# Patient Record
Sex: FEMALE | Race: WHITE | NOT HISPANIC OR LATINO | Employment: FULL TIME | URBAN - NONMETROPOLITAN AREA
[De-identification: names, ages, dates, MRNs, and addresses within clinical notes are randomized per-mention and may not be internally consistent; named-entity substitution may affect disease eponyms.]

---

## 2018-08-30 PROBLEM — M81.8 IDIOPATHIC OSTEOPOROSIS: Status: ACTIVE | Noted: 2018-08-30

## 2018-08-30 RX ORDER — SODIUM CHLORIDE 9 MG/ML
250 INJECTION, SOLUTION INTRAVENOUS ONCE
Status: CANCELLED | OUTPATIENT
Start: 2018-08-30

## 2018-09-04 RX ORDER — SODIUM CHLORIDE 9 MG/ML
250 INJECTION, SOLUTION INTRAVENOUS ONCE
Status: CANCELLED | OUTPATIENT
Start: 2018-09-04

## 2018-09-07 ENCOUNTER — APPOINTMENT (OUTPATIENT)
Dept: ONCOLOGY | Facility: HOSPITAL | Age: 53
End: 2018-09-07

## 2018-09-10 ENCOUNTER — INFUSION (OUTPATIENT)
Dept: ONCOLOGY | Facility: HOSPITAL | Age: 53
End: 2018-09-10

## 2018-09-10 VITALS
DIASTOLIC BLOOD PRESSURE: 61 MMHG | RESPIRATION RATE: 16 BRPM | TEMPERATURE: 97 F | HEART RATE: 70 BPM | SYSTOLIC BLOOD PRESSURE: 111 MMHG

## 2018-09-10 DIAGNOSIS — M81.8 IDIOPATHIC OSTEOPOROSIS: Primary | ICD-10-CM

## 2018-09-10 PROCEDURE — 96365 THER/PROPH/DIAG IV INF INIT: CPT | Performed by: NURSE PRACTITIONER

## 2018-09-10 PROCEDURE — 25010000002 ZOLEDRONIC ACID 5 MG/100ML SOLUTION: Performed by: NURSE PRACTITIONER

## 2018-09-10 RX ORDER — SODIUM CHLORIDE 9 MG/ML
250 INJECTION, SOLUTION INTRAVENOUS ONCE
OUTPATIENT
Start: 2018-09-10

## 2018-09-10 RX ORDER — ZOLEDRONIC ACID 5 MG/100ML
5 INJECTION, SOLUTION INTRAVENOUS ONCE
Status: CANCELLED | OUTPATIENT
Start: 2018-09-10

## 2018-09-10 RX ORDER — SODIUM CHLORIDE 9 MG/ML
250 INJECTION, SOLUTION INTRAVENOUS ONCE
Status: COMPLETED | OUTPATIENT
Start: 2018-09-10 | End: 2018-09-10

## 2018-09-10 RX ORDER — ZOLEDRONIC ACID 5 MG/100ML
5 INJECTION, SOLUTION INTRAVENOUS ONCE
Status: COMPLETED | OUTPATIENT
Start: 2018-09-10 | End: 2018-09-10

## 2018-09-10 RX ADMIN — ZOLEDRONIC ACID 5 MG: 5 INJECTION, SOLUTION INTRAVENOUS at 13:48

## 2018-09-10 RX ADMIN — SODIUM CHLORIDE 250 ML: 9 INJECTION, SOLUTION INTRAVENOUS at 13:48

## 2019-09-10 ENCOUNTER — APPOINTMENT (OUTPATIENT)
Dept: ONCOLOGY | Facility: HOSPITAL | Age: 54
End: 2019-09-10

## 2020-07-08 ENCOUNTER — OFFICE VISIT (OUTPATIENT)
Dept: ORTHOPEDIC SURGERY | Facility: CLINIC | Age: 55
End: 2020-07-08

## 2020-07-08 VITALS — HEIGHT: 63 IN | BODY MASS INDEX: 22.86 KG/M2 | WEIGHT: 129 LBS

## 2020-07-08 DIAGNOSIS — S46.011A ROTATOR CUFF STRAIN, RIGHT, INITIAL ENCOUNTER: ICD-10-CM
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
## 2020-07-08 DIAGNOSIS — M79.604 LOW BACK PAIN RADIATING TO RIGHT LOWER EXTREMITY: ICD-10-CM

## 2020-07-08 DIAGNOSIS — M25.511 ACUTE PAIN OF RIGHT SHOULDER: Primary | ICD-10-CM

## 2020-07-08 DIAGNOSIS — M25.551 RIGHT HIP PAIN: ICD-10-CM

## 2020-07-08 DIAGNOSIS — M54.50 LOW BACK PAIN RADIATING TO RIGHT LOWER EXTREMITY: ICD-10-CM

## 2020-07-08 PROCEDURE — 20610 DRAIN/INJ JOINT/BURSA W/O US: CPT | Performed by: ORTHOPAEDIC SURGERY

## 2020-07-08 PROCEDURE — 99204 OFFICE O/P NEW MOD 45 MIN: CPT | Performed by: ORTHOPAEDIC SURGERY

## 2020-07-08 RX ORDER — METHYLPREDNISOLONE ACETATE 40 MG/ML
80 INJECTION, SUSPENSION INTRA-ARTICULAR; INTRALESIONAL; INTRAMUSCULAR; SOFT TISSUE ONCE
Status: COMPLETED | OUTPATIENT
Start: 2020-07-08 | End: 2020-07-08

## 2020-07-08 RX ORDER — ESCITALOPRAM OXALATE 20 MG/1
20 TABLET ORAL DAILY
COMMUNITY
Start: 2020-06-23 | End: 2021-09-07

## 2020-07-08 RX ORDER — TEMAZEPAM 15 MG/1
15 CAPSULE ORAL NIGHTLY PRN
COMMUNITY
Start: 2020-06-24 | End: 2020-08-31

## 2020-07-08 RX ORDER — ESCITALOPRAM OXALATE 20 MG/1
TABLET ORAL
COMMUNITY
Start: 2020-06-23 | End: 2020-07-08 | Stop reason: SDUPTHER

## 2020-07-08 RX ORDER — CYCLOBENZAPRINE HCL 10 MG
TABLET ORAL
COMMUNITY
Start: 2020-06-29 | End: 2020-08-17

## 2020-07-08 RX ADMIN — METHYLPREDNISOLONE ACETATE 80 MG: 40 INJECTION, SUSPENSION INTRA-ARTICULAR; INTRALESIONAL; INTRAMUSCULAR; SOFT TISSUE at 16:13
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
## 2020-07-16 ENCOUNTER — HOSPITAL ENCOUNTER (OUTPATIENT)
Dept: PHYSICAL THERAPY | Facility: HOSPITAL | Age: 55
Setting detail: THERAPIES SERIES
Discharge: HOME | End: 2020-07-16

## 2020-07-16 DIAGNOSIS — M54.50 LOW BACK PAIN RADIATING TO RIGHT LOWER EXTREMITY: ICD-10-CM

## 2020-07-16 DIAGNOSIS — M25.551 RIGHT HIP PAIN: ICD-10-CM

## 2020-07-16 DIAGNOSIS — M25.511 ACUTE PAIN OF RIGHT SHOULDER: Primary | ICD-10-CM
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
## 2020-07-16 DIAGNOSIS — M79.604 LOW BACK PAIN RADIATING TO RIGHT LOWER EXTREMITY: ICD-10-CM

## 2020-07-16 DIAGNOSIS — S46.011A ROTATOR CUFF STRAIN, RIGHT, INITIAL ENCOUNTER: ICD-10-CM

## 2020-07-16 PROCEDURE — 97162 PT EVAL MOD COMPLEX 30 MIN: CPT
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
## 2020-07-21 ENCOUNTER — HOSPITAL ENCOUNTER (OUTPATIENT)
Dept: PHYSICAL THERAPY | Facility: HOSPITAL | Age: 55
Setting detail: THERAPIES SERIES
Discharge: HOME | End: 2020-07-21

## 2020-07-21 DIAGNOSIS — S46.011A ROTATOR CUFF STRAIN, RIGHT, INITIAL ENCOUNTER: ICD-10-CM

## 2020-07-21 DIAGNOSIS — M79.604 LOW BACK PAIN RADIATING TO RIGHT LOWER EXTREMITY: ICD-10-CM
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
## 2020-07-21 DIAGNOSIS — M25.511 ACUTE PAIN OF RIGHT SHOULDER: Primary | ICD-10-CM

## 2020-07-21 DIAGNOSIS — M25.551 RIGHT HIP PAIN: ICD-10-CM

## 2020-07-21 DIAGNOSIS — M54.50 LOW BACK PAIN RADIATING TO RIGHT LOWER EXTREMITY: ICD-10-CM

## 2020-07-21 PROCEDURE — 97110 THERAPEUTIC EXERCISES: CPT

## 2020-07-21 PROCEDURE — 97140 MANUAL THERAPY 1/> REGIONS: CPT
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
## 2020-07-23 ENCOUNTER — HOSPITAL ENCOUNTER (OUTPATIENT)
Dept: PHYSICAL THERAPY | Facility: HOSPITAL | Age: 55
Setting detail: THERAPIES SERIES
Discharge: HOME | End: 2020-07-23

## 2020-07-23 DIAGNOSIS — M79.604 LOW BACK PAIN RADIATING TO RIGHT LOWER EXTREMITY: ICD-10-CM

## 2020-07-23 DIAGNOSIS — M25.511 ACUTE PAIN OF RIGHT SHOULDER: Primary | ICD-10-CM

## 2020-07-23 DIAGNOSIS — M54.50 LOW BACK PAIN RADIATING TO RIGHT LOWER EXTREMITY: ICD-10-CM

## 2020-07-23 DIAGNOSIS — M25.551 RIGHT HIP PAIN: ICD-10-CM
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
## 2020-07-23 PROCEDURE — 97110 THERAPEUTIC EXERCISES: CPT
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
## 2020-07-24 ENCOUNTER — TELEPHONE (OUTPATIENT)
Dept: ORTHOPEDIC SURGERY | Facility: CLINIC | Age: 55
End: 2020-07-24

## 2020-07-24 ENCOUNTER — OFFICE VISIT (OUTPATIENT)
Dept: CARDIAC SURGERY | Facility: CLINIC | Age: 55
End: 2020-07-24

## 2020-07-24 VITALS
HEIGHT: 63 IN | DIASTOLIC BLOOD PRESSURE: 70 MMHG | WEIGHT: 129 LBS | TEMPERATURE: 98.2 F | BODY MASS INDEX: 22.86 KG/M2 | HEART RATE: 83 BPM | SYSTOLIC BLOOD PRESSURE: 120 MMHG | OXYGEN SATURATION: 98 %

## 2020-07-24 DIAGNOSIS — J43.1 PANLOBULAR EMPHYSEMA (HCC): ICD-10-CM

## 2020-07-24 DIAGNOSIS — E78.2 MIXED HYPERLIPIDEMIA: ICD-10-CM

## 2020-07-24 DIAGNOSIS — I10 BENIGN ESSENTIAL HTN: ICD-10-CM

## 2020-07-24 DIAGNOSIS — R91.1 SOLITARY PULMONARY NODULE: Primary | ICD-10-CM

## 2020-07-24 PROCEDURE — 99204 OFFICE O/P NEW MOD 45 MIN: CPT | Performed by: THORACIC SURGERY (CARDIOTHORACIC VASCULAR SURGERY)

## 2020-07-24 RX ORDER — PRAVASTATIN SODIUM 40 MG
40 TABLET ORAL NIGHTLY
COMMUNITY
End: 2021-11-04

## 2020-07-27 ENCOUNTER — TELEPHONE (OUTPATIENT)
Dept: ORTHOPEDIC SURGERY | Facility: CLINIC | Age: 55
End: 2020-07-27

## 2020-07-28 ENCOUNTER — HOSPITAL ENCOUNTER (OUTPATIENT)
Dept: PHYSICAL THERAPY | Facility: HOSPITAL | Age: 55
Setting detail: THERAPIES SERIES
Discharge: HOME | End: 2020-07-28

## 2020-07-28 DIAGNOSIS — S46.011A ROTATOR CUFF STRAIN, RIGHT, INITIAL ENCOUNTER: ICD-10-CM
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
## 2020-07-28 DIAGNOSIS — M25.511 ACUTE PAIN OF RIGHT SHOULDER: Primary | ICD-10-CM

## 2020-07-28 DIAGNOSIS — M54.50 LOW BACK PAIN RADIATING TO RIGHT LOWER EXTREMITY: ICD-10-CM

## 2020-07-28 DIAGNOSIS — M79.604 LOW BACK PAIN RADIATING TO RIGHT LOWER EXTREMITY: ICD-10-CM

## 2020-07-28 DIAGNOSIS — M25.551 RIGHT HIP PAIN: ICD-10-CM

## 2020-07-28 PROCEDURE — 97110 THERAPEUTIC EXERCISES: CPT

## 2020-07-28 PROCEDURE — 97140 MANUAL THERAPY 1/> REGIONS: CPT
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
## 2020-07-29 ENCOUNTER — OFFICE VISIT (OUTPATIENT)
Dept: ORTHOPEDIC SURGERY | Facility: CLINIC | Age: 55
End: 2020-07-29

## 2020-07-29 VITALS — WEIGHT: 126 LBS | BODY MASS INDEX: 22.32 KG/M2 | HEIGHT: 63 IN

## 2020-07-29 DIAGNOSIS — M25.511 ACUTE PAIN OF RIGHT SHOULDER: Primary | ICD-10-CM

## 2020-07-29 DIAGNOSIS — M54.50 LOW BACK PAIN RADIATING TO RIGHT LOWER EXTREMITY: ICD-10-CM
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
## 2020-07-29 DIAGNOSIS — M25.551 RIGHT HIP PAIN: ICD-10-CM

## 2020-07-29 DIAGNOSIS — S46.011A ROTATOR CUFF STRAIN, RIGHT, INITIAL ENCOUNTER: ICD-10-CM

## 2020-07-29 DIAGNOSIS — M79.604 LOW BACK PAIN RADIATING TO RIGHT LOWER EXTREMITY: ICD-10-CM

## 2020-07-29 PROCEDURE — 99214 OFFICE O/P EST MOD 30 MIN: CPT | Performed by: ORTHOPAEDIC SURGERY
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
## 2020-07-30 ENCOUNTER — HOSPITAL ENCOUNTER (OUTPATIENT)
Dept: PHYSICAL THERAPY | Facility: HOSPITAL | Age: 55
Setting detail: THERAPIES SERIES
Discharge: HOME | End: 2020-07-30

## 2020-07-30 DIAGNOSIS — M25.551 RIGHT HIP PAIN: ICD-10-CM
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
## 2020-07-30 DIAGNOSIS — M54.50 LOW BACK PAIN RADIATING TO RIGHT LOWER EXTREMITY: ICD-10-CM

## 2020-07-30 DIAGNOSIS — S46.011A ROTATOR CUFF STRAIN, RIGHT, INITIAL ENCOUNTER: ICD-10-CM

## 2020-07-30 DIAGNOSIS — M79.604 LOW BACK PAIN RADIATING TO RIGHT LOWER EXTREMITY: ICD-10-CM

## 2020-07-30 DIAGNOSIS — M25.511 ACUTE PAIN OF RIGHT SHOULDER: Primary | ICD-10-CM

## 2020-07-30 PROCEDURE — 97110 THERAPEUTIC EXERCISES: CPT
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
## 2020-07-31 ENCOUNTER — HOSPITAL ENCOUNTER (OUTPATIENT)
Dept: PET IMAGING | Facility: HOSPITAL | Age: 55
Discharge: HOME | End: 2020-07-31
Admitting: THORACIC SURGERY (CARDIOTHORACIC VASCULAR SURGERY)

## 2020-07-31 ENCOUNTER — PROCEDURE VISIT (OUTPATIENT)
Dept: PULMONOLOGY | Facility: CLINIC | Age: 55
End: 2020-07-31

## 2020-07-31 ENCOUNTER — APPOINTMENT (OUTPATIENT)
Dept: PHYSICAL THERAPY | Facility: HOSPITAL | Age: 55
End: 2020-07-31

## 2020-07-31 DIAGNOSIS — J43.1 PANLOBULAR EMPHYSEMA (HCC): ICD-10-CM

## 2020-07-31 DIAGNOSIS — R91.1 SOLITARY PULMONARY NODULE: ICD-10-CM

## 2020-07-31 PROCEDURE — A9552 F18 FDG: HCPCS | Performed by: THORACIC SURGERY (CARDIOTHORACIC VASCULAR SURGERY)

## 2020-07-31 PROCEDURE — 0 FLUDEOXYGLUCOSE F18 SOLUTION: Performed by: THORACIC SURGERY (CARDIOTHORACIC VASCULAR SURGERY)

## 2020-07-31 PROCEDURE — 94729 DIFFUSING CAPACITY: CPT | Performed by: INTERNAL MEDICINE

## 2020-07-31 PROCEDURE — 94727 GAS DIL/WSHOT DETER LNG VOL: CPT | Performed by: INTERNAL MEDICINE

## 2020-07-31 PROCEDURE — 78815 PET IMAGE W/CT SKULL-THIGH: CPT

## 2020-07-31 PROCEDURE — 94060 EVALUATION OF WHEEZING: CPT | Performed by: INTERNAL MEDICINE

## 2020-07-31 RX ADMIN — FLUDEOXYGLUCOSE F18 1 DOSE: 300 INJECTION INTRAVENOUS at 09:02

## 2020-08-04 ENCOUNTER — HOSPITAL ENCOUNTER (OUTPATIENT)
Dept: PHYSICAL THERAPY | Facility: HOSPITAL | Age: 55
Setting detail: THERAPIES SERIES
Discharge: HOME | End: 2020-08-04

## 2020-08-04 DIAGNOSIS — M79.604 LOW BACK PAIN RADIATING TO RIGHT LOWER EXTREMITY: ICD-10-CM

## 2020-08-04 DIAGNOSIS — M54.50 LOW BACK PAIN RADIATING TO RIGHT LOWER EXTREMITY: ICD-10-CM
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
## 2020-08-04 DIAGNOSIS — S46.011A STRAIN OF RIGHT ROTATOR CUFF CAPSULE, INITIAL ENCOUNTER: ICD-10-CM

## 2020-08-04 DIAGNOSIS — M25.551 RIGHT HIP PAIN: ICD-10-CM

## 2020-08-04 DIAGNOSIS — M25.511 ACUTE PAIN OF RIGHT SHOULDER: Primary | ICD-10-CM

## 2020-08-04 PROCEDURE — 97140 MANUAL THERAPY 1/> REGIONS: CPT

## 2020-08-04 PROCEDURE — 97110 THERAPEUTIC EXERCISES: CPT
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
## 2020-08-06 ENCOUNTER — HOSPITAL ENCOUNTER (OUTPATIENT)
Dept: PHYSICAL THERAPY | Facility: HOSPITAL | Age: 55
Setting detail: THERAPIES SERIES
Discharge: HOME | End: 2020-08-06
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
## 2020-08-06 DIAGNOSIS — M25.551 RIGHT HIP PAIN: ICD-10-CM

## 2020-08-06 DIAGNOSIS — M54.50 LOW BACK PAIN RADIATING TO RIGHT LOWER EXTREMITY: ICD-10-CM

## 2020-08-06 DIAGNOSIS — S46.011A ROTATOR CUFF STRAIN, RIGHT, INITIAL ENCOUNTER: ICD-10-CM

## 2020-08-06 DIAGNOSIS — M79.604 LOW BACK PAIN RADIATING TO RIGHT LOWER EXTREMITY: ICD-10-CM

## 2020-08-06 DIAGNOSIS — M25.511 ACUTE PAIN OF RIGHT SHOULDER: Primary | ICD-10-CM

## 2020-08-06 PROCEDURE — 97110 THERAPEUTIC EXERCISES: CPT

## 2020-08-06 PROCEDURE — 97140 MANUAL THERAPY 1/> REGIONS: CPT
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
## 2020-08-10 ENCOUNTER — OFFICE VISIT (OUTPATIENT)
Dept: CARDIAC SURGERY | Facility: CLINIC | Age: 55
End: 2020-08-10

## 2020-08-10 ENCOUNTER — PROCEDURE VISIT (OUTPATIENT)
Dept: PULMONOLOGY | Facility: CLINIC | Age: 55
End: 2020-08-10

## 2020-08-10 VITALS
DIASTOLIC BLOOD PRESSURE: 76 MMHG | SYSTOLIC BLOOD PRESSURE: 133 MMHG | HEIGHT: 63 IN | OXYGEN SATURATION: 98 % | BODY MASS INDEX: 22.54 KG/M2 | WEIGHT: 127.2 LBS | HEART RATE: 76 BPM

## 2020-08-10 DIAGNOSIS — R91.1 LUNG NODULE: ICD-10-CM

## 2020-08-10 DIAGNOSIS — E78.2 MIXED HYPERLIPIDEMIA: ICD-10-CM

## 2020-08-10 DIAGNOSIS — I10 BENIGN ESSENTIAL HTN: ICD-10-CM

## 2020-08-10 DIAGNOSIS — J43.1 PANLOBULAR EMPHYSEMA (HCC): Primary | ICD-10-CM

## 2020-08-10 DIAGNOSIS — J43.1 PANLOBULAR EMPHYSEMA (HCC): ICD-10-CM

## 2020-08-10 PROCEDURE — 94060 EVALUATION OF WHEEZING: CPT | Performed by: INTERNAL MEDICINE

## 2020-08-10 PROCEDURE — 99215 OFFICE O/P EST HI 40 MIN: CPT | Performed by: THORACIC SURGERY (CARDIOTHORACIC VASCULAR SURGERY)

## 2020-08-10 RX ORDER — SODIUM CHLORIDE 9 MG/ML
50 INJECTION, SOLUTION INTRAVENOUS CONTINUOUS
Status: CANCELLED | OUTPATIENT
Start: 2020-08-20

## 2020-08-10 RX ORDER — BUPIVACAINE HCL/0.9 % NACL/PF 0.1 %
2 PLASTIC BAG, INJECTION (ML) EPIDURAL ONCE
Status: CANCELLED | OUTPATIENT
Start: 2020-08-20 | End: 2020-08-10

## 2020-08-11 ENCOUNTER — TELEPHONE (OUTPATIENT)
Dept: ORTHOPEDIC SURGERY | Facility: CLINIC | Age: 55
End: 2020-08-11

## 2020-08-11 ENCOUNTER — HOSPITAL ENCOUNTER (OUTPATIENT)
Dept: PHYSICAL THERAPY | Facility: HOSPITAL | Age: 55
Setting detail: THERAPIES SERIES
Discharge: HOME | End: 2020-08-11

## 2020-08-11 DIAGNOSIS — M79.604 LOW BACK PAIN RADIATING TO RIGHT LOWER EXTREMITY: ICD-10-CM

## 2020-08-11 DIAGNOSIS — S46.011A ROTATOR CUFF STRAIN, RIGHT, INITIAL ENCOUNTER: ICD-10-CM
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
## 2020-08-11 DIAGNOSIS — M25.511 ACUTE PAIN OF RIGHT SHOULDER: Primary | ICD-10-CM

## 2020-08-11 DIAGNOSIS — M25.551 RIGHT HIP PAIN: ICD-10-CM

## 2020-08-11 DIAGNOSIS — M54.50 LOW BACK PAIN RADIATING TO RIGHT LOWER EXTREMITY: ICD-10-CM

## 2020-08-11 PROCEDURE — 97110 THERAPEUTIC EXERCISES: CPT

## 2020-08-11 NOTE — TELEPHONE ENCOUNTER
GRADY       Pt JUST WANTED TO LET YOU KNOW SHE HAD TO CANCEL HER APPOINTMENT BECAUSE SHE HAS LUNG CANCER AND THEY ARE GOING TO BE DOING SURGERY     SHE STATES HER SHOULDER HAS IMPROVED BUT HER LOWER BACK RT HIP IS STILL GIVING HER A LOT OF ISSUES AND WILL RESCHEDULE WITH YOU ONCE SHE HAS RECOVERED FROM SURGERY
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
## 2020-08-13 ENCOUNTER — APPOINTMENT (OUTPATIENT)
Dept: PHYSICAL THERAPY | Facility: HOSPITAL | Age: 55
End: 2020-08-13

## 2020-08-13 ENCOUNTER — ANESTHESIA EVENT (OUTPATIENT)
Dept: PERIOP | Facility: HOSPITAL | Age: 55
End: 2020-08-13

## 2020-08-17 ENCOUNTER — HOSPITAL ENCOUNTER (INPATIENT)
Facility: HOSPITAL | Age: 55
LOS: 4 days | Discharge: HOME | End: 2020-08-24
Attending: THORACIC SURGERY (CARDIOTHORACIC VASCULAR SURGERY) | Admitting: THORACIC SURGERY (CARDIOTHORACIC VASCULAR SURGERY)

## 2020-08-17 ENCOUNTER — APPOINTMENT (OUTPATIENT)
Dept: PREADMISSION TESTING | Facility: HOSPITAL | Age: 55
End: 2020-08-17

## 2020-08-17 ENCOUNTER — LAB (OUTPATIENT)
Dept: LAB | Facility: HOSPITAL | Age: 55
End: 2020-08-17

## 2020-08-17 VITALS
HEART RATE: 72 BPM | RESPIRATION RATE: 18 BRPM | WEIGHT: 127 LBS | OXYGEN SATURATION: 97 % | DIASTOLIC BLOOD PRESSURE: 60 MMHG | HEIGHT: 63 IN | BODY MASS INDEX: 22.5 KG/M2 | SYSTOLIC BLOOD PRESSURE: 106 MMHG

## 2020-08-17 DIAGNOSIS — Z48.813 SURGICAL AFTERCARE, RESPIRATORY SYSTEM: Primary | ICD-10-CM

## 2020-08-17 DIAGNOSIS — R91.1 LUNG NODULE: ICD-10-CM

## 2020-08-17 LAB
ABO GROUP BLD: NORMAL
BLD GP AB SCN SERPL QL: NEGATIVE
DEPRECATED RDW RBC AUTO: 44.6 FL (ref 37–54)
ERYTHROCYTE [DISTWIDTH] IN BLOOD BY AUTOMATED COUNT: 13.2 % (ref 12.3–15.4)
HCT VFR BLD AUTO: 35.4 % (ref 34–46.6)
HGB BLD-MCNC: 12.6 G/DL (ref 12–15.9)
Lab: NORMAL
MCH RBC QN AUTO: 32.7 PG (ref 26.6–33)
MCHC RBC AUTO-ENTMCNC: 35.6 G/DL (ref 31.5–35.7)
MCV RBC AUTO: 91.9 FL (ref 79–97)
PLATELET # BLD AUTO: 319 10*3/MM3 (ref 140–450)
PMV BLD AUTO: 8.2 FL (ref 6–12)
RBC # BLD AUTO: 3.85 10*6/MM3 (ref 3.77–5.28)
RH BLD: POSITIVE
T&S EXPIRATION DATE: NORMAL
WBC # BLD AUTO: 6.94 10*3/MM3 (ref 3.4–10.8)

## 2020-08-17 PROCEDURE — 86901 BLOOD TYPING SEROLOGIC RH(D): CPT | Performed by: THORACIC SURGERY (CARDIOTHORACIC VASCULAR SURGERY)

## 2020-08-17 PROCEDURE — 36415 COLL VENOUS BLD VENIPUNCTURE: CPT

## 2020-08-17 PROCEDURE — C9803 HOPD COVID-19 SPEC COLLECT: HCPCS | Performed by: THORACIC SURGERY (CARDIOTHORACIC VASCULAR SURGERY)

## 2020-08-17 PROCEDURE — 86900 BLOOD TYPING SEROLOGIC ABO: CPT | Performed by: THORACIC SURGERY (CARDIOTHORACIC VASCULAR SURGERY)

## 2020-08-17 PROCEDURE — U0003 INFECTIOUS AGENT DETECTION BY NUCLEIC ACID (DNA OR RNA); SEVERE ACUTE RESPIRATORY SYNDROME CORONAVIRUS 2 (SARS-COV-2) (CORONAVIRUS DISEASE [COVID-19]), AMPLIFIED PROBE TECHNIQUE, MAKING USE OF HIGH THROUGHPUT TECHNOLOGIES AS DESCRIBED BY CMS-2020-01-R: HCPCS | Performed by: THORACIC SURGERY (CARDIOTHORACIC VASCULAR SURGERY)

## 2020-08-17 PROCEDURE — 86850 RBC ANTIBODY SCREEN: CPT | Performed by: THORACIC SURGERY (CARDIOTHORACIC VASCULAR SURGERY)

## 2020-08-17 PROCEDURE — 85027 COMPLETE CBC AUTOMATED: CPT | Performed by: THORACIC SURGERY (CARDIOTHORACIC VASCULAR SURGERY)

## 2020-08-17 RX ORDER — NICOTINE 21 MG/24HR
1 PATCH, TRANSDERMAL 24 HOURS TRANSDERMAL EVERY 24 HOURS
COMMUNITY
End: 2021-09-07

## 2020-08-17 RX ORDER — FLUTICASONE PROPIONATE 50 MCG
1 SPRAY, SUSPENSION (ML) NASAL DAILY
COMMUNITY

## 2020-08-17 NOTE — DISCHARGE INSTRUCTIONS
[facility]  Pre-op Information and Guidelines    You will be called after 2 p.m. the day before your surgery (Friday for Monday surgery) and notified of your time for arrival and approximate surgery time.  If you have not received a call by 4P.M., please contact Same Day Surgery at [phone] of if outside [facility] call [phone].    Please Follow these Important Safety Guidelines:    • The morning of your procedure, take only the medications listed below with   A sip of water:_____________________________________________       ______________________________________________    • DO NOT eat or drink anything after 12:00 midnight the night before surgery  Specific instructions concerning drinking clear liquids will be discussed during  the pre-surgery instruction call the day before your surgery.    • If you take a blood thinner (ex. Plavix, Coumadin, aspirin), ask your doctor when to stop it before surgery  STOP DATE: _________________    • Only 2 visitors are allowed in patient rooms at a time  Your visitors will be asked to wait in the lobby until the admission process is complete with the exception of a parent with a child and patients in need of special assistance.    • YOU CANNOT DRIVE YOURSELF HOME  You must be accompanied by someone who will be responsible for driving you home after surgery and for your care at home.    • DO NOT chew gum, use breath mints, hard candy, or smoke the day of surgery  • DO NOT drink alcohol for at least 24 hours before your surgery  • DO NOT wear any jewelry and remove all body piercing before coming to the hospital  • DO NOT wear make-up to the hospital  • If you are having surgery on an extremity (arm/leg/foot) remove nail polish/artificial nails on the surgical side  • Clothing, glasses, contacts, dentures, and hairpieces must be removed before surgery  • Bathe the night before or the morning of your surgery and do not use powders/lotions on  skin.

## 2020-08-18 ENCOUNTER — APPOINTMENT (OUTPATIENT)
Dept: PHYSICAL THERAPY | Facility: HOSPITAL | Age: 55
End: 2020-08-18

## 2020-08-18 LAB
COVID LABCORP PRIORITY: NORMAL
SARS-COV-2 RNA RESP QL NAA+PROBE: NOT DETECTED

## 2020-08-20 ENCOUNTER — ANESTHESIA (OUTPATIENT)
Dept: PERIOP | Facility: HOSPITAL | Age: 55
End: 2020-08-20

## 2020-08-20 ENCOUNTER — APPOINTMENT (OUTPATIENT)
Dept: GENERAL RADIOLOGY | Facility: HOSPITAL | Age: 55
End: 2020-08-20

## 2020-08-20 LAB
ABO GROUP BLD: NORMAL
BLD GP AB SCN SERPL QL: NEGATIVE
Lab: NORMAL
RH BLD: POSITIVE
T&S EXPIRATION DATE: NORMAL

## 2020-08-20 PROCEDURE — 25010000002 SUCCINYLCHOLINE PER 20 MG: Performed by: NURSE ANESTHETIST, CERTIFIED REGISTERED

## 2020-08-20 PROCEDURE — 32674 THORACOSCOPY LYMPH NODE EXC: CPT | Performed by: THORACIC SURGERY (CARDIOTHORACIC VASCULAR SURGERY)

## 2020-08-20 PROCEDURE — 86900 BLOOD TYPING SEROLOGIC ABO: CPT | Performed by: ANESTHESIOLOGY

## 2020-08-20 PROCEDURE — 25010000002 HYDROMORPHONE PER 4 MG: Performed by: THORACIC SURGERY (CARDIOTHORACIC VASCULAR SURGERY)

## 2020-08-20 PROCEDURE — 88342 IMHCHEM/IMCYTCHM 1ST ANTB: CPT

## 2020-08-20 PROCEDURE — 94640 AIRWAY INHALATION TREATMENT: CPT

## 2020-08-20 PROCEDURE — 88341 IMHCHEM/IMCYTCHM EA ADD ANTB: CPT

## 2020-08-20 PROCEDURE — 25010000002 ONDANSETRON PER 1 MG: Performed by: NURSE ANESTHETIST, CERTIFIED REGISTERED

## 2020-08-20 PROCEDURE — 25010000002 KETOROLAC TROMETHAMINE PER 15 MG: Performed by: THORACIC SURGERY (CARDIOTHORACIC VASCULAR SURGERY)

## 2020-08-20 PROCEDURE — S0260 H&P FOR SURGERY: HCPCS | Performed by: THORACIC SURGERY (CARDIOTHORACIC VASCULAR SURGERY)

## 2020-08-20 PROCEDURE — 86901 BLOOD TYPING SEROLOGIC RH(D): CPT | Performed by: ANESTHESIOLOGY

## 2020-08-20 PROCEDURE — 25010000002 DEXAMETHASONE PER 1 MG: Performed by: NURSE ANESTHETIST, CERTIFIED REGISTERED

## 2020-08-20 PROCEDURE — 0BBJ4ZZ EXCISION OF LEFT LOWER LUNG LOBE, PERCUTANEOUS ENDOSCOPIC APPROACH: ICD-10-PCS | Performed by: THORACIC SURGERY (CARDIOTHORACIC VASCULAR SURGERY)

## 2020-08-20 PROCEDURE — 25010000003 CEFAZOLIN PER 500 MG: Performed by: THORACIC SURGERY (CARDIOTHORACIC VASCULAR SURGERY)

## 2020-08-20 PROCEDURE — 25010000002 MIDAZOLAM PER 1 MG: Performed by: NURSE ANESTHETIST, CERTIFIED REGISTERED

## 2020-08-20 PROCEDURE — 86850 RBC ANTIBODY SCREEN: CPT | Performed by: ANESTHESIOLOGY

## 2020-08-20 PROCEDURE — 88313 SPECIAL STAINS GROUP 2: CPT

## 2020-08-20 PROCEDURE — 25010000002 CEFAZOLIN PER 500 MG: Performed by: THORACIC SURGERY (CARDIOTHORACIC VASCULAR SURGERY)

## 2020-08-20 PROCEDURE — 25010000002 HYDROMORPHONE 1 MG/ML SOLUTION: Performed by: NURSE ANESTHETIST, CERTIFIED REGISTERED

## 2020-08-20 PROCEDURE — 94799 UNLISTED PULMONARY SVC/PX: CPT

## 2020-08-20 PROCEDURE — 94760 N-INVAS EAR/PLS OXIMETRY 1: CPT

## 2020-08-20 PROCEDURE — 25010000002 PROPOFOL 10 MG/ML EMULSION: Performed by: NURSE ANESTHETIST, CERTIFIED REGISTERED

## 2020-08-20 PROCEDURE — 31622 DX BRONCHOSCOPE/WASH: CPT | Performed by: THORACIC SURGERY (CARDIOTHORACIC VASCULAR SURGERY)

## 2020-08-20 PROCEDURE — 88307 TISSUE EXAM BY PATHOLOGIST: CPT

## 2020-08-20 PROCEDURE — 25010000002 NEOSTIGMINE 4 MG/4ML SOLUTION PREFILLED SYRINGE: Performed by: NURSE ANESTHETIST, CERTIFIED REGISTERED

## 2020-08-20 PROCEDURE — 07B74ZX EXCISION OF THORAX LYMPHATIC, PERCUTANEOUS ENDOSCOPIC APPROACH, DIAGNOSTIC: ICD-10-PCS | Performed by: THORACIC SURGERY (CARDIOTHORACIC VASCULAR SURGERY)

## 2020-08-20 PROCEDURE — C1729 CATH, DRAINAGE: HCPCS | Performed by: THORACIC SURGERY (CARDIOTHORACIC VASCULAR SURGERY)

## 2020-08-20 PROCEDURE — 88331 PATH CONSLTJ SURG 1 BLK 1SPC: CPT

## 2020-08-20 PROCEDURE — 71045 X-RAY EXAM CHEST 1 VIEW: CPT

## 2020-08-20 PROCEDURE — 25010000002 FENTANYL CITRATE (PF) 100 MCG/2ML SOLUTION: Performed by: NURSE ANESTHETIST, CERTIFIED REGISTERED

## 2020-08-20 PROCEDURE — 32663 THORACOSCOPY W/LOBECTOMY: CPT | Performed by: THORACIC SURGERY (CARDIOTHORACIC VASCULAR SURGERY)

## 2020-08-20 PROCEDURE — 0BJ08ZZ INSPECTION OF TRACHEOBRONCHIAL TREE, VIA NATURAL OR ARTIFICIAL OPENING ENDOSCOPIC: ICD-10-PCS | Performed by: THORACIC SURGERY (CARDIOTHORACIC VASCULAR SURGERY)

## 2020-08-20 DEVICE — ENDOPATH ECHELON ENDOSCOPIC LINEAR CUTTER RELOADS, BLACK, 60MM
Type: IMPLANTABLE DEVICE | Site: LUNG | Status: FUNCTIONAL
Brand: ECHELON ENDOPATH

## 2020-08-20 DEVICE — PROXIMATE RELOADABLE LINEAR STAPLER, 30MM
Type: IMPLANTABLE DEVICE | Site: LUNG | Status: FUNCTIONAL
Brand: PROXIMATE

## 2020-08-20 DEVICE — ENDOPATH ECHELON VASCULAR  RELOADS, WHITE, 35MM
Type: IMPLANTABLE DEVICE | Site: LUNG | Status: FUNCTIONAL
Brand: ECHELON ENDOPATH

## 2020-08-20 DEVICE — STPL/LN REINF PERISTRIP DRY VERITAS GEL: Type: IMPLANTABLE DEVICE | Site: LUNG | Status: FUNCTIONAL

## 2020-08-20 DEVICE — PERI-STRIPS DRY WITH VERITAS COLLAGEN MATRIX (PSD-V) IS PREPARED FROM DEHYDRATED BOVINE PERICARDIUM PROCURED FROM CATTLE UNDER 30 MONTHS OF AGE IN THE UNITED STATES. ONE (1) TUBE OF PSD GEL (GEL) IS PROVIDED FOR EVERY TWO (2) POUCHES OF PSD-V. THE GEL IS USED TO CREATE A TEMPORARY BOND BETWEEN THE PSD-V BUTTRESS AND THE SURGICAL STAPLER JAWS UNTIL THE STAPLER IS POSITIONED AND FIRED.
Type: IMPLANTABLE DEVICE | Site: LUNG | Status: FUNCTIONAL
Brand: PERI-STRIPS DRY WITH VERITAS COLLAGEN MATRIX

## 2020-08-20 DEVICE — PROXIMATE LINEAR STAPLER RELOADS
Type: IMPLANTABLE DEVICE | Site: LUNG | Status: FUNCTIONAL
Brand: PROXIMATE

## 2020-08-20 DEVICE — CLIP LIGAT VASC HORIZON TI SM/WD RD 6CT: Type: IMPLANTABLE DEVICE | Site: LUNG | Status: FUNCTIONAL

## 2020-08-20 DEVICE — LIGACLIP MCA MULTIPLE CLIP APPLIERS, 20 MEDIUM CLIPS
Type: IMPLANTABLE DEVICE | Site: LUNG | Status: FUNCTIONAL
Brand: LIGACLIP

## 2020-08-20 RX ORDER — CEFAZOLIN SODIUM 1 G/3ML
INJECTION, POWDER, FOR SOLUTION INTRAMUSCULAR; INTRAVENOUS AS NEEDED
Status: DISCONTINUED | OUTPATIENT
Start: 2020-08-20 | End: 2020-08-20 | Stop reason: HOSPADM

## 2020-08-20 RX ORDER — NEOSTIGMINE METHYLSULFATE 4 MG/4 ML
SYRINGE (ML) INTRAVENOUS AS NEEDED
Status: DISCONTINUED | OUTPATIENT
Start: 2020-08-20 | End: 2020-08-20 | Stop reason: SURG

## 2020-08-20 RX ORDER — FLUMAZENIL 0.1 MG/ML
0.2 INJECTION INTRAVENOUS AS NEEDED
Status: DISCONTINUED | OUTPATIENT
Start: 2020-08-20 | End: 2020-08-20 | Stop reason: HOSPADM

## 2020-08-20 RX ORDER — ACETAMINOPHEN 325 MG/1
650 TABLET ORAL ONCE AS NEEDED
Status: DISCONTINUED | OUTPATIENT
Start: 2020-08-20 | End: 2020-08-20 | Stop reason: HOSPADM

## 2020-08-20 RX ORDER — DIPHENHYDRAMINE HYDROCHLORIDE 50 MG/ML
25 INJECTION INTRAMUSCULAR; INTRAVENOUS EVERY 6 HOURS PRN
Status: DISCONTINUED | OUTPATIENT
Start: 2020-08-20 | End: 2020-08-21

## 2020-08-20 RX ORDER — BUPIVACAINE HCL/0.9 % NACL/PF 0.1 %
2 PLASTIC BAG, INJECTION (ML) EPIDURAL ONCE
Status: COMPLETED | OUTPATIENT
Start: 2020-08-20 | End: 2020-08-20

## 2020-08-20 RX ORDER — BUPIVACAINE HYDROCHLORIDE 5 MG/ML
INJECTION, SOLUTION EPIDURAL; INTRACAUDAL AS NEEDED
Status: DISCONTINUED | OUTPATIENT
Start: 2020-08-20 | End: 2020-08-20 | Stop reason: HOSPADM

## 2020-08-20 RX ORDER — EPHEDRINE SULFATE 50 MG/ML
5 INJECTION, SOLUTION INTRAVENOUS ONCE AS NEEDED
Status: DISCONTINUED | OUTPATIENT
Start: 2020-08-20 | End: 2020-08-20 | Stop reason: HOSPADM

## 2020-08-20 RX ORDER — SODIUM CHLORIDE, SODIUM GLUCONATE, SODIUM ACETATE, POTASSIUM CHLORIDE, AND MAGNESIUM CHLORIDE 526; 502; 368; 37; 30 MG/100ML; MG/100ML; MG/100ML; MG/100ML; MG/100ML
INJECTION, SOLUTION INTRAVENOUS CONTINUOUS PRN
Status: DISCONTINUED | OUTPATIENT
Start: 2020-08-20 | End: 2020-08-20 | Stop reason: SURG

## 2020-08-20 RX ORDER — NALOXONE HCL 0.4 MG/ML
0.4 VIAL (ML) INJECTION AS NEEDED
Status: DISCONTINUED | OUTPATIENT
Start: 2020-08-20 | End: 2020-08-20 | Stop reason: HOSPADM

## 2020-08-20 RX ORDER — ONDANSETRON 2 MG/ML
4 INJECTION INTRAMUSCULAR; INTRAVENOUS EVERY 6 HOURS PRN
Status: DISCONTINUED | OUTPATIENT
Start: 2020-08-20 | End: 2020-08-24 | Stop reason: HOSPADM

## 2020-08-20 RX ORDER — SODIUM CHLORIDE 9 MG/ML
30 INJECTION, SOLUTION INTRAVENOUS CONTINUOUS
Status: DISCONTINUED | OUTPATIENT
Start: 2020-08-20 | End: 2020-08-21

## 2020-08-20 RX ORDER — ONDANSETRON 2 MG/ML
INJECTION INTRAMUSCULAR; INTRAVENOUS AS NEEDED
Status: DISCONTINUED | OUTPATIENT
Start: 2020-08-20 | End: 2020-08-20 | Stop reason: SURG

## 2020-08-20 RX ORDER — PROMETHAZINE HYDROCHLORIDE 25 MG/1
25 TABLET ORAL ONCE AS NEEDED
Status: DISCONTINUED | OUTPATIENT
Start: 2020-08-20 | End: 2020-08-20 | Stop reason: HOSPADM

## 2020-08-20 RX ORDER — NALOXONE HCL 0.4 MG/ML
0.1 VIAL (ML) INJECTION
Status: DISCONTINUED | OUTPATIENT
Start: 2020-08-20 | End: 2020-08-21

## 2020-08-20 RX ORDER — PROPOFOL 10 MG/ML
VIAL (ML) INTRAVENOUS AS NEEDED
Status: DISCONTINUED | OUTPATIENT
Start: 2020-08-20 | End: 2020-08-20 | Stop reason: SURG

## 2020-08-20 RX ORDER — NICOTINE 21 MG/24HR
1 PATCH, TRANSDERMAL 24 HOURS TRANSDERMAL EVERY 24 HOURS
Status: DISCONTINUED | OUTPATIENT
Start: 2020-08-21 | End: 2020-08-24 | Stop reason: HOSPADM

## 2020-08-20 RX ORDER — PROMETHAZINE HYDROCHLORIDE 25 MG/1
25 SUPPOSITORY RECTAL ONCE AS NEEDED
Status: DISCONTINUED | OUTPATIENT
Start: 2020-08-20 | End: 2020-08-20 | Stop reason: HOSPADM

## 2020-08-20 RX ORDER — MEPERIDINE HYDROCHLORIDE 25 MG/ML
12.5 INJECTION INTRAMUSCULAR; INTRAVENOUS; SUBCUTANEOUS
Status: DISCONTINUED | OUTPATIENT
Start: 2020-08-20 | End: 2020-08-20 | Stop reason: HOSPADM

## 2020-08-20 RX ORDER — DEXAMETHASONE SODIUM PHOSPHATE 4 MG/ML
INJECTION, SOLUTION INTRA-ARTICULAR; INTRALESIONAL; INTRAMUSCULAR; INTRAVENOUS; SOFT TISSUE AS NEEDED
Status: DISCONTINUED | OUTPATIENT
Start: 2020-08-20 | End: 2020-08-20 | Stop reason: SURG

## 2020-08-20 RX ORDER — ROCURONIUM BROMIDE 10 MG/ML
INJECTION, SOLUTION INTRAVENOUS AS NEEDED
Status: DISCONTINUED | OUTPATIENT
Start: 2020-08-20 | End: 2020-08-20 | Stop reason: SURG

## 2020-08-20 RX ORDER — LABETALOL HYDROCHLORIDE 5 MG/ML
5 INJECTION, SOLUTION INTRAVENOUS
Status: DISCONTINUED | OUTPATIENT
Start: 2020-08-20 | End: 2020-08-20 | Stop reason: HOSPADM

## 2020-08-20 RX ORDER — KETOROLAC TROMETHAMINE 10 MG/1
10 TABLET, FILM COATED ORAL EVERY 8 HOURS
Status: COMPLETED | OUTPATIENT
Start: 2020-08-20 | End: 2020-08-23

## 2020-08-20 RX ORDER — BISACODYL 10 MG
10 SUPPOSITORY, RECTAL RECTAL DAILY PRN
Status: DISCONTINUED | OUTPATIENT
Start: 2020-08-20 | End: 2020-08-24 | Stop reason: HOSPADM

## 2020-08-20 RX ORDER — ACETAMINOPHEN 500 MG
1000 TABLET ORAL EVERY 6 HOURS SCHEDULED
Status: DISCONTINUED | OUTPATIENT
Start: 2020-08-20 | End: 2020-08-24 | Stop reason: HOSPADM

## 2020-08-20 RX ORDER — NITROGLYCERIN 20 MG/100ML
5-200 INJECTION INTRAVENOUS
Status: DISCONTINUED | OUTPATIENT
Start: 2020-08-20 | End: 2020-08-21

## 2020-08-20 RX ORDER — ESCITALOPRAM OXALATE 10 MG/1
20 TABLET ORAL DAILY
Status: DISCONTINUED | OUTPATIENT
Start: 2020-08-21 | End: 2020-08-24 | Stop reason: HOSPADM

## 2020-08-20 RX ORDER — MIDAZOLAM HYDROCHLORIDE 1 MG/ML
INJECTION INTRAMUSCULAR; INTRAVENOUS AS NEEDED
Status: DISCONTINUED | OUTPATIENT
Start: 2020-08-20 | End: 2020-08-20 | Stop reason: SURG

## 2020-08-20 RX ORDER — ONDANSETRON 4 MG/1
4 TABLET, FILM COATED ORAL EVERY 6 HOURS PRN
Status: DISCONTINUED | OUTPATIENT
Start: 2020-08-20 | End: 2020-08-24 | Stop reason: HOSPADM

## 2020-08-20 RX ORDER — EPHEDRINE SULFATE 50 MG/ML
INJECTION, SOLUTION INTRAVENOUS AS NEEDED
Status: DISCONTINUED | OUTPATIENT
Start: 2020-08-20 | End: 2020-08-20 | Stop reason: SURG

## 2020-08-20 RX ORDER — BUPIVACAINE HCL/0.9 % NACL/PF 0.1 %
2 PLASTIC BAG, INJECTION (ML) EPIDURAL EVERY 8 HOURS
Status: COMPLETED | OUTPATIENT
Start: 2020-08-20 | End: 2020-08-21

## 2020-08-20 RX ORDER — LIDOCAINE HYDROCHLORIDE 20 MG/ML
INJECTION, SOLUTION INFILTRATION; PERINEURAL AS NEEDED
Status: DISCONTINUED | OUTPATIENT
Start: 2020-08-20 | End: 2020-08-20 | Stop reason: SURG

## 2020-08-20 RX ORDER — SCOLOPAMINE TRANSDERMAL SYSTEM 1 MG/1
1 PATCH, EXTENDED RELEASE TRANSDERMAL
Status: DISCONTINUED | OUTPATIENT
Start: 2020-08-20 | End: 2020-08-20 | Stop reason: HOSPADM

## 2020-08-20 RX ORDER — PRAVASTATIN SODIUM 40 MG
40 TABLET ORAL NIGHTLY
Status: DISCONTINUED | OUTPATIENT
Start: 2020-08-20 | End: 2020-08-24 | Stop reason: HOSPADM

## 2020-08-20 RX ORDER — FLUTICASONE PROPIONATE 50 MCG
1 SPRAY, SUSPENSION (ML) NASAL DAILY
Status: DISCONTINUED | OUTPATIENT
Start: 2020-08-20 | End: 2020-08-24 | Stop reason: HOSPADM

## 2020-08-20 RX ORDER — MAGNESIUM HYDROXIDE 1200 MG/15ML
LIQUID ORAL AS NEEDED
Status: DISCONTINUED | OUTPATIENT
Start: 2020-08-20 | End: 2020-08-20 | Stop reason: HOSPADM

## 2020-08-20 RX ORDER — AMOXICILLIN 250 MG
2 CAPSULE ORAL NIGHTLY
Status: DISCONTINUED | OUTPATIENT
Start: 2020-08-20 | End: 2020-08-24 | Stop reason: HOSPADM

## 2020-08-20 RX ORDER — ONDANSETRON 2 MG/ML
4 INJECTION INTRAMUSCULAR; INTRAVENOUS ONCE AS NEEDED
Status: DISCONTINUED | OUTPATIENT
Start: 2020-08-20 | End: 2020-08-20 | Stop reason: HOSPADM

## 2020-08-20 RX ORDER — ALBUTEROL SULFATE 2.5 MG/3ML
2.5 SOLUTION RESPIRATORY (INHALATION)
Status: DISCONTINUED | OUTPATIENT
Start: 2020-08-20 | End: 2020-08-21

## 2020-08-20 RX ORDER — DIPHENHYDRAMINE HYDROCHLORIDE 50 MG/ML
12.5 INJECTION INTRAMUSCULAR; INTRAVENOUS
Status: DISCONTINUED | OUTPATIENT
Start: 2020-08-20 | End: 2020-08-20 | Stop reason: HOSPADM

## 2020-08-20 RX ORDER — SUCCINYLCHOLINE CHLORIDE 20 MG/ML
INJECTION INTRAMUSCULAR; INTRAVENOUS AS NEEDED
Status: DISCONTINUED | OUTPATIENT
Start: 2020-08-20 | End: 2020-08-20 | Stop reason: SURG

## 2020-08-20 RX ORDER — KETOROLAC TROMETHAMINE 30 MG/ML
30 INJECTION, SOLUTION INTRAMUSCULAR; INTRAVENOUS ONCE
Status: COMPLETED | OUTPATIENT
Start: 2020-08-20 | End: 2020-08-20

## 2020-08-20 RX ORDER — PROMETHAZINE HYDROCHLORIDE 25 MG/ML
12.5 INJECTION, SOLUTION INTRAMUSCULAR; INTRAVENOUS ONCE AS NEEDED
Status: DISCONTINUED | OUTPATIENT
Start: 2020-08-20 | End: 2020-08-20 | Stop reason: HOSPADM

## 2020-08-20 RX ORDER — HYDROMORPHONE HCL IN 0.9% NACL 0.2 MG/ML
PLASTIC BAG, INJECTION (ML) INTRAVENOUS CONTINUOUS
Status: DISCONTINUED | OUTPATIENT
Start: 2020-08-20 | End: 2020-08-21

## 2020-08-20 RX ORDER — ACETAMINOPHEN 650 MG/1
650 SUPPOSITORY RECTAL ONCE AS NEEDED
Status: DISCONTINUED | OUTPATIENT
Start: 2020-08-20 | End: 2020-08-20 | Stop reason: HOSPADM

## 2020-08-20 RX ORDER — TEMAZEPAM 15 MG/1
15 CAPSULE ORAL NIGHTLY PRN
Status: DISCONTINUED | OUTPATIENT
Start: 2020-08-20 | End: 2020-08-24 | Stop reason: HOSPADM

## 2020-08-20 RX ORDER — SODIUM CHLORIDE 9 MG/ML
50 INJECTION, SOLUTION INTRAVENOUS CONTINUOUS
Status: DISCONTINUED | OUTPATIENT
Start: 2020-08-20 | End: 2020-08-20 | Stop reason: HOSPADM

## 2020-08-20 RX ORDER — FENTANYL CITRATE 50 UG/ML
INJECTION, SOLUTION INTRAMUSCULAR; INTRAVENOUS AS NEEDED
Status: DISCONTINUED | OUTPATIENT
Start: 2020-08-20 | End: 2020-08-20 | Stop reason: SURG

## 2020-08-20 RX ADMIN — LIDOCAINE HYDROCHLORIDE 50 MG: 20 INJECTION, SOLUTION INFILTRATION; PERINEURAL at 07:24

## 2020-08-20 RX ADMIN — KETOROLAC TROMETHAMINE 10 MG: 10 TABLET, FILM COATED ORAL at 19:39

## 2020-08-20 RX ADMIN — PRAVASTATIN SODIUM 40 MG: 40 TABLET ORAL at 21:59

## 2020-08-20 RX ADMIN — Medication 2 MG: at 10:17

## 2020-08-20 RX ADMIN — SODIUM CHLORIDE 50 ML/HR: 9 INJECTION, SOLUTION INTRAVENOUS at 06:27

## 2020-08-20 RX ADMIN — HYDROMORPHONE HYDROCHLORIDE 0.5 MG: 1 INJECTION, SOLUTION INTRAMUSCULAR; INTRAVENOUS; SUBCUTANEOUS at 08:35

## 2020-08-20 RX ADMIN — ROCURONIUM BROMIDE 10 MG: 10 INJECTION INTRAVENOUS at 08:47

## 2020-08-20 RX ADMIN — SCOPALAMINE 1 PATCH: 1 PATCH, EXTENDED RELEASE TRANSDERMAL at 06:29

## 2020-08-20 RX ADMIN — FENTANYL CITRATE 50 MCG: 50 INJECTION, SOLUTION INTRAMUSCULAR; INTRAVENOUS at 08:48

## 2020-08-20 RX ADMIN — SODIUM CHLORIDE, SODIUM GLUCONATE, SODIUM ACETATE, POTASSIUM CHLORIDE, AND MAGNESIUM CHLORIDE: 526; 502; 368; 37; 30 INJECTION, SOLUTION INTRAVENOUS at 07:43

## 2020-08-20 RX ADMIN — MIDAZOLAM HYDROCHLORIDE 2 MG: 2 INJECTION, SOLUTION INTRAMUSCULAR; INTRAVENOUS at 07:13

## 2020-08-20 RX ADMIN — ONDANSETRON 4 MG: 2 INJECTION INTRAMUSCULAR; INTRAVENOUS at 10:18

## 2020-08-20 RX ADMIN — SODIUM CHLORIDE 30 ML/HR: 9 INJECTION, SOLUTION INTRAVENOUS at 21:59

## 2020-08-20 RX ADMIN — ROCURONIUM BROMIDE 20 MG: 10 INJECTION INTRAVENOUS at 08:12

## 2020-08-20 RX ADMIN — DEXAMETHASONE SODIUM PHOSPHATE 4 MG: 4 INJECTION, SOLUTION INTRAMUSCULAR; INTRAVENOUS at 10:10

## 2020-08-20 RX ADMIN — CEFAZOLIN SODIUM 2 G: 10 INJECTION, POWDER, FOR SOLUTION INTRAVENOUS at 16:41

## 2020-08-20 RX ADMIN — HYDROMORPHONE HYDROCHLORIDE 0.5 MG: 1 INJECTION, SOLUTION INTRAMUSCULAR; INTRAVENOUS; SUBCUTANEOUS at 12:30

## 2020-08-20 RX ADMIN — KETOROLAC TROMETHAMINE 30 MG: 30 INJECTION, SOLUTION INTRAMUSCULAR; INTRAVENOUS at 11:30

## 2020-08-20 RX ADMIN — HYDROMORPHONE HYDROCHLORIDE: 2 INJECTION INTRAMUSCULAR; INTRAVENOUS; SUBCUTANEOUS at 13:45

## 2020-08-20 RX ADMIN — GLYCOPYRROLATE 0.4 MG: 0.2 INJECTION, SOLUTION INTRAMUSCULAR; INTRAVITREAL at 10:17

## 2020-08-20 RX ADMIN — EPHEDRINE SULFATE 10 MG: 50 INJECTION INTRAVENOUS at 08:41

## 2020-08-20 RX ADMIN — ACETAMINOPHEN 1000 MG: 500 TABLET ORAL at 11:59

## 2020-08-20 RX ADMIN — ACETAMINOPHEN 1000 MG: 500 TABLET ORAL at 19:39

## 2020-08-20 RX ADMIN — DOCUSATE SODIUM 50 MG AND SENNOSIDES 8.6 MG 2 TABLET: 8.6; 5 TABLET, FILM COATED ORAL at 21:59

## 2020-08-20 RX ADMIN — SODIUM CHLORIDE 30 ML/HR: 9 INJECTION, SOLUTION INTRAVENOUS at 12:25

## 2020-08-20 RX ADMIN — SUCCINYLCHOLINE CHLORIDE 160 MG: 20 INJECTION, SOLUTION INTRAMUSCULAR; INTRAVENOUS at 07:24

## 2020-08-20 RX ADMIN — SODIUM CHLORIDE, SODIUM GLUCONATE, SODIUM ACETATE, POTASSIUM CHLORIDE, AND MAGNESIUM CHLORIDE: 526; 502; 368; 37; 30 INJECTION, SOLUTION INTRAVENOUS at 09:27

## 2020-08-20 RX ADMIN — HYDROMORPHONE HYDROCHLORIDE 0.5 MG: 1 INJECTION, SOLUTION INTRAMUSCULAR; INTRAVENOUS; SUBCUTANEOUS at 09:27

## 2020-08-20 RX ADMIN — FENTANYL CITRATE 50 MCG: 50 INJECTION, SOLUTION INTRAMUSCULAR; INTRAVENOUS at 08:24

## 2020-08-20 RX ADMIN — ALBUTEROL SULFATE 2.5 MG: 2.5 SOLUTION RESPIRATORY (INHALATION) at 15:42

## 2020-08-20 RX ADMIN — ROCURONIUM BROMIDE 20 MG: 10 INJECTION INTRAVENOUS at 09:09

## 2020-08-20 RX ADMIN — Medication 2 G: at 07:43

## 2020-08-20 RX ADMIN — FENTANYL CITRATE 100 MCG: 50 INJECTION, SOLUTION INTRAMUSCULAR; INTRAVENOUS at 07:24

## 2020-08-20 RX ADMIN — ROCURONIUM BROMIDE 5 MG: 10 INJECTION INTRAVENOUS at 07:24

## 2020-08-20 RX ADMIN — FLUTICASONE PROPIONATE 1 SPRAY: 50 SPRAY, METERED NASAL at 16:00

## 2020-08-20 RX ADMIN — ROCURONIUM BROMIDE 25 MG: 10 INJECTION INTRAVENOUS at 07:35

## 2020-08-20 RX ADMIN — PROPOFOL 130 MG: 10 INJECTION, EMULSION INTRAVENOUS at 07:24
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
## 2020-08-20 NOTE — ANESTHESIA POSTPROCEDURE EVALUATION
Patient: Alysia Deutsch    Procedure Summary     Date:  08/20/20 Room / Location:  [facility] OR  / [facility] OR    Anesthesia Start:  0714 Anesthesia Stop:  1052    Procedure:  LEFT THORACOSCOPY VIDEO ASSISTED THORACOTOMY pulmonary resection thoracic mediastinal lymphadenectomy bronchoscopy (Left Chest) Diagnosis:       Lung nodule      (Lung nodule [R91.1])    Surgeon:  Horace Christiansen MD Provider:  Sterling Escalante MD    Anesthesia Type:  general ASA Status:  3          Anesthesia Type: general    Vitals  No vitals data found for the desired time range.          Post Anesthesia Care and Evaluation    Patient location during evaluation: PACU  Patient participation: complete - patient participated  Level of consciousness: sleepy but conscious  Pain score: 0  Pain management: adequate  Airway patency: patent  Anesthetic complications: No anesthetic complications  PONV Status: none  Cardiovascular status: acceptable  Respiratory status: acceptable  Hydration status: acceptable

## 2020-08-20 NOTE — ANESTHESIA PROCEDURE NOTES
Arterial Line      Patient location during procedure: OR  Start time: 8/20/2020 7:30 AM  Stop Time:8/20/2020 7:32 AM       Line placed for hemodynamic monitoring and ABGs/Labs/ISTAT.  Performed By   CRNA: Brittny Valdez SRNA  Preanesthetic Checklist  Completed: patient identified, site marked, surgical consent, pre-op evaluation, timeout performed, IV checked, risks and benefits discussed and monitors and equipment checked  Arterial Line Prep   Sterile Tech: cap, gloves, mask and sterile barriers  Prep: ChloraPrep  Patient monitoring: blood pressure monitoring, continuous pulse oximetry and EKG  Arterial Line Procedure   Location:  radial artery  Catheter size: 20 G   Guidance: ultrasound guided  PROCEDURE NOTE/ULTRASOUND INTERPRETATION.  Using ultrasound guidance the potential vascular sites for insertion of the catheter were visualized to determine the patency of the vessel to be used for vascular access.  After selecting the appropriate site for insertion, the needle was visualized under ultrasound being inserted into the radial artery, followed by ultrasound confirmation of wire and catheter placement. There were no abnormalities seen on ultrasound; an image was taken; and the patient tolerated the procedure with no complications.   Number of attempts: 1  Successful placement: yes  Post Assessment   Dressing Type: occlusive dressing applied and secured with tape.   Complications no  Circ/Move/Sens Assessment: normal and unchanged.   Patient Tolerance: patient tolerated the procedure well with no apparent complications

## 2020-08-20 NOTE — ANESTHESIA PROCEDURE NOTES
Airway  Date/Time: 8/20/2020 7:35 AM  End Time:8/20/2020 7:36 AM  Airway not difficult    General Information and Staff    CRNA: Brittny Valdez SRNA    Indications and Patient Condition  Indications for airway management: airway protection    Preoxygenated: yes  Mask difficulty assessment: 0 - not attempted    Final Airway Details  Final airway type: endotracheal airway      Successful airway: EBT - double lumen left  Cuffed: yes   Successful intubation technique: direct laryngoscopy and flexible bronchoscopy  Facilitating devices/methods: intubating stylet  Endotracheal tube insertion site: oral  Blade: Laron  Blade size: 3  EBT DL size (fr): 37  Cormack-Lehane Classification: grade I - full view of glottis  Placement verified by: chest auscultation and bronchoscopy   Tracheal cuff pressure (cm H2O): 5  Bronchial cuff pressure (cm H2O): 0  Measured from: lips  ETT/EBT  to lips (cm): 27  Number of attempts at approach: 1  Assessment: lips, teeth, and gum same as pre-op and atraumatic intubation

## 2020-08-20 NOTE — ANESTHESIA PROCEDURE NOTES
Peripheral IV    Patient location during procedure: OR  Start time: 8/20/2020 7:30 AM  End time: 8/20/2020 7:31 AM  Line placed for Fluids/Medication Admin.  Performed By   CRNA: Myra Mcgrath CRNA  Preanesthetic Checklist  Completed: patient identified, site marked, surgical consent, pre-op evaluation, timeout performed, IV checked, risks and benefits discussed and monitors and equipment checked  Peripheral IV Prep   Patient position: supine   Prep: ChloraPrep  Peripheral IV Procedure   Laterality:left  Location:  Hand  Catheter size: 20 G         Post Assessment   Dressing Type: transparent.    IV Dressing/Site: clean, dry and intact

## 2020-08-20 NOTE — ANESTHESIA PREPROCEDURE EVALUATION
Anesthesia Evaluation     Patient summary reviewed   history of anesthetic complications: PONV  NPO Solid Status: > 8 hours             Airway   Mallampati: II  Neck ROM: full  No difficulty expected  Dental    (+) upper dentures    Pulmonary - normal exam   (+) a smoker Former,   Cardiovascular - normal exam  Exercise tolerance: good (4-7 METS)    NYHA Classification: II  ECG reviewed    (+) RIDLEY, hyperlipidemia,       Neuro/Psych  (+) psychiatric history Anxiety,     GI/Hepatic/Renal/Endo    (+)   hepatitis, liver disease,     Musculoskeletal     (+) back pain,   Abdominal    Substance History      OB/GYN          Other   arthritis,                      Anesthesia Plan    ASA 3     general     intravenous induction     Anesthetic plan, all risks, benefits, and alternatives have been provided, discussed and informed consent has been obtained with: patient.  Use of blood products discussed with patient .

## 2020-08-20 NOTE — OP NOTE
OPERATIVE NOTE  Alysia Deutsch  1965 8/20/2020    PREOP DIAGNOSES:  Lung nodule [R91.1]    POSTOP DIAGNOSES:  Lung nodule [R91.1]  Lung cancer    PROCEDURE:   LEFT THORACOSCOPY VIDEO ASSISTED   LEFT lower lobectomy  thoracic mediastinal lymphadenectomy   bronchoscopy    SURGEON: ITA Christiansen MD FACS [facility]    ASSISTANT: Tracy Davalos CFA  provided critical assistance during the case including suctioning, exposure, retraction, and reduction of blood loss.    ANESTHESIA: General ET     ESTIMATED BLOOD LOSS: minimal    SPECIMEN:  lung and lymph node    COMPLICATIONS: None    DESCRIPTION OF OPERATION: Patient taken to operating room, placed in supine position.  General anesthesia induced.  Radial arterial line placed by Anesthesia.  Prepped and draped in sterile fashion.  Fiberoptic bronchoscopy performed thru single lumen endotracheal tube, no endobronchial lesions, airways clear, minimal secretions.  Patient placed in RIGHT lateral decubitus position with bean bag as axillary roll, table flexed.  Prepped and draped in sterile fashion. 1cm port site placed LEFT 8th interspace anterior axillary line. Thoracoscope was introduced demonstrating normal-appearing lung, diaphragm, pericardium with mild adhesions.     LEFT lower lobe posterior nodule identified, consistent with CT imaging.  Wedge resection performed with complete excision of nodule using Endo ANAHI stapler with seamguard support. Frozen section was obtained by pathology and called back as positive for adenocarcinoma.      LEFT Inferior pulmonary ligament divided up to inferior pulmonary vein. Pulmonary artery to lower lobe identified in major fissure, dissected and divided with vascular load stapler.  Inferior pulmonary vein divided with vascular load stapler. Lower lobe bronchus divided with Endo ANAHI green load stapler. Remainder of fissure completed with Endo ANAHI black load stapler with SEAMGUARD support.      Level 6, 8, 10 lymph nodes were  identified, removed and sent separately.   No level 2, 4, 7 lymph nodes were identified.     2 limbs ON-Q pain pump placed, one subpleural paravertebral space posteriorly, second interspace incision.  24Fr multihole chest tube placed posteriorly to apex.  Lung inflated to fill space, small air leak.    Incision closed layers of #2 Vicryl pericostal sutures around the fifth rib thru the 6th rib. Serratus was reapproximated using 2-0 PDS suture, 2-0 PDS in the latissimus fascia, 2-0 PDS and subcutaneous tissue and 4-0 Monocryl skin with Dermabond tape. Tolerated procedure well, transferred to PACU in stable condition.              This document has been electronically signed by Horace Christiansen MD on August 20, 2020 10:24

## 2020-08-21 LAB
ANION GAP SERPL CALCULATED.3IONS-SCNC: 8 MMOL/L (ref 5–15)
BASOPHILS # BLD AUTO: 0.01 10*3/MM3 (ref 0–0.2)
BASOPHILS NFR BLD AUTO: 0.1 % (ref 0–1.5)
BUN SERPL-MCNC: 6 MG/DL (ref 6–20)
BUN/CREAT SERPL: 9 (ref 7–25)
CALCIUM SPEC-SCNC: 7.9 MG/DL (ref 8.6–10.5)
CHLORIDE SERPL-SCNC: 100 MMOL/L (ref 98–107)
CO2 SERPL-SCNC: 23 MMOL/L (ref 22–29)
CREAT SERPL-MCNC: 0.67 MG/DL (ref 0.57–1)
DEPRECATED RDW RBC AUTO: 46.5 FL (ref 37–54)
EOSINOPHIL # BLD AUTO: 0.03 10*3/MM3 (ref 0–0.4)
EOSINOPHIL NFR BLD AUTO: 0.3 % (ref 0.3–6.2)
ERYTHROCYTE [DISTWIDTH] IN BLOOD BY AUTOMATED COUNT: 13.6 % (ref 12.3–15.4)
GFR SERPL CREATININE-BSD FRML MDRD: 91 ML/MIN/1.73
GLUCOSE SERPL-MCNC: 93 MG/DL (ref 65–99)
HCT VFR BLD AUTO: 31.3 % (ref 34–46.6)
HGB BLD-MCNC: 11 G/DL (ref 12–15.9)
IMM GRANULOCYTES # BLD AUTO: 0.04 10*3/MM3 (ref 0–0.05)
IMM GRANULOCYTES NFR BLD AUTO: 0.4 % (ref 0–0.5)
LYMPHOCYTES # BLD AUTO: 2.03 10*3/MM3 (ref 0.7–3.1)
LYMPHOCYTES NFR BLD AUTO: 22.1 % (ref 19.6–45.3)
MCH RBC QN AUTO: 33.1 PG (ref 26.6–33)
MCHC RBC AUTO-ENTMCNC: 35.1 G/DL (ref 31.5–35.7)
MCV RBC AUTO: 94.3 FL (ref 79–97)
MONOCYTES # BLD AUTO: 0.93 10*3/MM3 (ref 0.1–0.9)
MONOCYTES NFR BLD AUTO: 10.1 % (ref 5–12)
NEUTROPHILS NFR BLD AUTO: 6.14 10*3/MM3 (ref 1.7–7)
NEUTROPHILS NFR BLD AUTO: 67 % (ref 42.7–76)
NRBC BLD AUTO-RTO: 0 /100 WBC (ref 0–0.2)
PLATELET # BLD AUTO: 278 10*3/MM3 (ref 140–450)
PMV BLD AUTO: 8.5 FL (ref 6–12)
POTASSIUM SERPL-SCNC: 4.2 MMOL/L (ref 3.5–5.2)
RBC # BLD AUTO: 3.32 10*6/MM3 (ref 3.77–5.28)
SODIUM SERPL-SCNC: 131 MMOL/L (ref 136–145)
WBC # BLD AUTO: 9.18 10*3/MM3 (ref 3.4–10.8)

## 2020-08-21 PROCEDURE — 25010000002 HYDROMORPHONE 1 MG/ML SOLUTION: Performed by: NURSE PRACTITIONER

## 2020-08-21 PROCEDURE — 85025 COMPLETE CBC W/AUTO DIFF WBC: CPT | Performed by: THORACIC SURGERY (CARDIOTHORACIC VASCULAR SURGERY)

## 2020-08-21 PROCEDURE — 25010000002 CEFAZOLIN PER 500 MG: Performed by: THORACIC SURGERY (CARDIOTHORACIC VASCULAR SURGERY)

## 2020-08-21 PROCEDURE — 80048 BASIC METABOLIC PNL TOTAL CA: CPT | Performed by: THORACIC SURGERY (CARDIOTHORACIC VASCULAR SURGERY)

## 2020-08-21 PROCEDURE — 94799 UNLISTED PULMONARY SVC/PX: CPT

## 2020-08-21 PROCEDURE — 25010000002 HYDROMORPHONE PER 4 MG: Performed by: THORACIC SURGERY (CARDIOTHORACIC VASCULAR SURGERY)

## 2020-08-21 PROCEDURE — 99024 POSTOP FOLLOW-UP VISIT: CPT | Performed by: NURSE PRACTITIONER

## 2020-08-21 RX ORDER — POLYETHYLENE GLYCOL 3350 17 G/17G
17 POWDER, FOR SOLUTION ORAL DAILY
Status: DISCONTINUED | OUTPATIENT
Start: 2020-08-21 | End: 2020-08-24 | Stop reason: HOSPADM

## 2020-08-21 RX ORDER — OXYCODONE HYDROCHLORIDE 5 MG/1
5 TABLET ORAL EVERY 4 HOURS PRN
Status: DISCONTINUED | OUTPATIENT
Start: 2020-08-21 | End: 2020-08-24 | Stop reason: HOSPADM

## 2020-08-21 RX ORDER — OXYCODONE HYDROCHLORIDE 5 MG/1
10 TABLET ORAL EVERY 4 HOURS PRN
Status: DISCONTINUED | OUTPATIENT
Start: 2020-08-21 | End: 2020-08-24 | Stop reason: HOSPADM

## 2020-08-21 RX ORDER — ALBUTEROL SULFATE 2.5 MG/3ML
2.5 SOLUTION RESPIRATORY (INHALATION)
Status: DISCONTINUED | OUTPATIENT
Start: 2020-08-21 | End: 2020-08-22

## 2020-08-21 RX ADMIN — KETOROLAC TROMETHAMINE 10 MG: 10 TABLET, FILM COATED ORAL at 04:29

## 2020-08-21 RX ADMIN — OXYCODONE HYDROCHLORIDE 10 MG: 5 TABLET ORAL at 16:19

## 2020-08-21 RX ADMIN — OXYCODONE HYDROCHLORIDE 10 MG: 5 TABLET ORAL at 20:12

## 2020-08-21 RX ADMIN — OXYCODONE HYDROCHLORIDE 10 MG: 5 TABLET ORAL at 11:14

## 2020-08-21 RX ADMIN — ALBUTEROL SULFATE 2.5 MG: 2.5 SOLUTION RESPIRATORY (INHALATION) at 07:28

## 2020-08-21 RX ADMIN — ACETAMINOPHEN 1000 MG: 500 TABLET ORAL at 12:34

## 2020-08-21 RX ADMIN — KETOROLAC TROMETHAMINE 10 MG: 10 TABLET, FILM COATED ORAL at 11:14

## 2020-08-21 RX ADMIN — PRAVASTATIN SODIUM 40 MG: 40 TABLET ORAL at 20:08

## 2020-08-21 RX ADMIN — CEFAZOLIN SODIUM 2 G: 10 INJECTION, POWDER, FOR SOLUTION INTRAVENOUS at 00:16

## 2020-08-21 RX ADMIN — KETOROLAC TROMETHAMINE 10 MG: 10 TABLET, FILM COATED ORAL at 18:12

## 2020-08-21 RX ADMIN — ESCITALOPRAM OXALATE 20 MG: 10 TABLET ORAL at 08:13

## 2020-08-21 RX ADMIN — FLUTICASONE PROPIONATE 1 SPRAY: 50 SPRAY, METERED NASAL at 08:13

## 2020-08-21 RX ADMIN — TEMAZEPAM 15 MG: 15 CAPSULE ORAL at 00:18

## 2020-08-21 RX ADMIN — ACETAMINOPHEN 1000 MG: 500 TABLET ORAL at 23:21

## 2020-08-21 RX ADMIN — POLYETHYLENE GLYCOL 3350 17 G: 17 POWDER, FOR SOLUTION ORAL at 11:14

## 2020-08-21 RX ADMIN — ACETAMINOPHEN 1000 MG: 500 TABLET ORAL at 06:23

## 2020-08-21 RX ADMIN — HYDROMORPHONE HYDROCHLORIDE: 2 INJECTION INTRAMUSCULAR; INTRAVENOUS; SUBCUTANEOUS at 08:17

## 2020-08-21 RX ADMIN — DOCUSATE SODIUM 50 MG AND SENNOSIDES 8.6 MG 2 TABLET: 8.6; 5 TABLET, FILM COATED ORAL at 20:08

## 2020-08-21 RX ADMIN — NICOTINE 1 PATCH: 14 PATCH, EXTENDED RELEASE TRANSDERMAL at 08:16

## 2020-08-21 RX ADMIN — ACETAMINOPHEN 1000 MG: 500 TABLET ORAL at 17:06

## 2020-08-21 RX ADMIN — ALBUTEROL SULFATE 2.5 MG: 2.5 SOLUTION RESPIRATORY (INHALATION) at 14:56

## 2020-08-21 RX ADMIN — TEMAZEPAM 15 MG: 15 CAPSULE ORAL at 20:08

## 2020-08-21 RX ADMIN — HYDROMORPHONE HYDROCHLORIDE 0.5 MG: 1 INJECTION, SOLUTION INTRAMUSCULAR; INTRAVENOUS; SUBCUTANEOUS at 18:45

## 2020-08-21 RX ADMIN — ACETAMINOPHEN 1000 MG: 500 TABLET ORAL at 00:16

## 2020-08-21 NOTE — PLAN OF CARE
Problem: Patient Care Overview  Goal: Plan of Care Review  Flowsheets (Taken 8/21/2020 173)  Progress: no change  Plan of Care Reviewed With: patient  Outcome Summary: patient moving independently.     Problem: Pain, Chronic (Adult)  Goal: Acceptable Pain/Comfort Level and Functional Ability  Flowsheets (Taken 8/21/2020 1733)  Acceptable Pain/Comfort Level and Functional Ability: making progress toward outcome     Problem: Lung Surgery (via Thoracotomy) (Adult)  Goal: Signs and Symptoms of Listed Potential Problems Will be Absent, Minimized or Managed (Lung Surgery)  Flowsheets (Taken 8/21/2020 1733)  Problems Assessed (Lung Surgery/Thoracotomy): all  Problems Present (Lung Surgery): none

## 2020-08-21 NOTE — PROGRESS NOTES
"  Cardiothoracic - Vascular Surgery Daily Note        LOS: 1 day   Patient Care Team:  Rosario Mckeon MD as PCP - General (Family Medicine)    POD#1 LEFT VATS. Lower lobectomy    Chief Complaint: Lung Nodule      Subjective     The following portions of the patient's history were reviewed and updated as appropriate: allergies, current medications, past family history, past medical history, past social history, past surgical history and problem list.     Subjective:  Symptoms:  Stable.    Diet:  Adequate intake.    Activity level: Returning to normal.    Pain:  She complains of pain that is mild.  Pain is well controlled and requiring pain medication.          Objective     Vital Signs  Temp:  [97.5 °F (36.4 °C)-98.6 °F (37 °C)] 98.2 °F (36.8 °C)  Heart Rate:  [] 65  Resp:  [16-22] 22  BP: ()/(51-63) 103/55  Arterial Line BP: ()/(47-77) 117/61  Body mass index is 21.95 kg/m².    Intake/Output Summary (Last 24 hours) at 8/21/2020 0928  Last data filed at 8/21/2020 0600  Gross per 24 hour   Intake 3891.22 ml   Output 2085 ml   Net 1806.22 ml     No intake/output data recorded.    Wt Readings from Last 3 Encounters:   08/20/20 56.2 kg (123 lb 14.4 oz)   08/17/20 57.6 kg (127 lb)   08/10/20 57.7 kg (127 lb 3.2 oz)     CT 450cc serosanginous  Small air leak. -10sx    Physical Exam   Objective:  General Appearance:  Well-appearing.    Vital signs: (most recent): Blood pressure 103/55, pulse 65, temperature 98.2 °F (36.8 °C), temperature source Oral, resp. rate 22, height 160 cm (63\"), weight 56.2 kg (123 lb 14.4 oz), SpO2 100 %.  Vital signs are normal.    Output: Producing urine and no stool output.    HEENT: Normal HEENT exam.    Lungs:  Normal effort and normal respiratory rate.  Breath sounds clear to auscultation.  (CT sm air leak)  Heart: Normal rate.  Regular rhythm.    Abdomen: Abdomen is soft.  Bowel sounds are normal.     Extremities: Normal range of motion.    Neurological: Patient is " alert and oriented to person, place and time.    Skin:  Warm and dry.  ((L) Thor: Prevena Intact)              Results Review:    Lab Results   Component Value Date    WBC 9.18 08/21/2020    HGB 11.0 (L) 08/21/2020    HCT 31.3 (L) 08/21/2020    MCV 94.3 08/21/2020     08/21/2020     Lab Results   Component Value Date    GLUCOSE 93 08/21/2020    BUN 6 08/21/2020    CREATININE 0.67 08/21/2020    EGFRIFNONA 91 08/21/2020    BCR 9.0 08/21/2020    K 4.2 08/21/2020    CO2 23.0 08/21/2020    CALCIUM 7.9 (L) 08/21/2020       Calcium Calcium   Date/Time Value Ref Range Status   08/21/2020 0524 7.9 (L) 8.6 - 10.5 mg/dL Final      Magnesium No results found for: MG     Imaging Results (Last 24 Hours)     Procedure Component Value Units Date/Time    XR Chest 1 View [[id number]] Collected:  08/20/20 1111     Updated:  08/20/20 1136    Narrative:       PROCEDURE: Portable chest x-ray    TECHNIQUE: Single frontal view of the chest    COMPARISON: PET/CT dated July 31, 2020.    HISTORY: lobectomy, R91.1 Solitary pulmonary nodule    FINDINGS:  There is an apically directed left chest tube. Subsegmental  atelectasis is noted in the left lung base. The lungs otherwise  appear clear. No pneumothorax is visualized. Cardiac silhouette  is normal in size.      Impression:       CONCLUSION:  Left chest tube. No pneumothorax visualized. Subsegmental  atelectasis left lung base.    Electronically signed by:  Gonzalo Valente MD  8/20/2020 11:35 AM  CDT Workstation: [id number]                                acetaminophen 1,000 mg Oral Q6H   albuterol 2.5 mg Nebulization Q8H - RT   escitalopram 20 mg Oral Daily   fluticasone 1 spray Nasal Daily   ketorolac 10 mg Oral Q8H   nicotine 1 patch Transdermal Q24H   pravastatin 40 mg Oral Nightly   sennosides-docusate 2 tablet Oral Nightly       HYDROmorphone HCl-NaCl     nitroglycerin 5-200 mcg/min Last Rate: Stopped (08/20/20 3294)   sodium chloride 30 mL/hr Last Rate: 30 mL/hr (08/20/20 5957)                ASSESSMENT/PLAN       Lung nodule      Assessment & Plan    Stable Post Op LEFT VATS: Progressing well    Lung Nodule: lower lobectomy. + adenocarcinoma. Await final path for staging    Respiratory: O2 support. Incentive. Ct -10sx    Depression: home lexapro    Pain: Scheduled Tylenol. DC PCA. OxyIR prn    Rehab: OOB chair. Ambulate    Disposition: Transfer 3W. Stable. Progressing.             This document has been electronically signed by MICHAEL Oliva on August 21, 2020 09:28

## 2020-08-21 NOTE — PLAN OF CARE
Problem: Patient Care Overview  Goal: Plan of Care Review  Outcome: Ongoing (interventions implemented as appropriate)  Flowsheets  Taken 8/21/2020 0650  Progress: no change  Outcome Summary: VSS through night. Pain control has been adequate through shift.  Arterial line removed at 0450, pt tolerated well. Bedolla catheter removed at 0530, pt tolerated well. Pt up to chair, moving well with one assist. Pt working with IS this morning. Attempted to wean pt from O2, unsuccessful, pt unable to maintain sats >91% on room air. O2 on at 1L.  Taken 8/21/2020 0200  Plan of Care Reviewed With: patient

## 2020-08-22 PROCEDURE — 94799 UNLISTED PULMONARY SVC/PX: CPT

## 2020-08-22 PROCEDURE — 25010000002 FUROSEMIDE PER 20 MG: Performed by: NURSE PRACTITIONER

## 2020-08-22 PROCEDURE — 94760 N-INVAS EAR/PLS OXIMETRY 1: CPT

## 2020-08-22 PROCEDURE — 25010000002 ONDANSETRON PER 1 MG: Performed by: NURSE PRACTITIONER

## 2020-08-22 PROCEDURE — 63710000001 ONDANSETRON PER 8 MG: Performed by: NURSE PRACTITIONER

## 2020-08-22 PROCEDURE — 99024 POSTOP FOLLOW-UP VISIT: CPT | Performed by: NURSE PRACTITIONER

## 2020-08-22 RX ORDER — FUROSEMIDE 10 MG/ML
20 INJECTION INTRAMUSCULAR; INTRAVENOUS ONCE
Status: COMPLETED | OUTPATIENT
Start: 2020-08-22 | End: 2020-08-22

## 2020-08-22 RX ADMIN — KETOROLAC TROMETHAMINE 10 MG: 10 TABLET, FILM COATED ORAL at 20:17

## 2020-08-22 RX ADMIN — OXYCODONE HYDROCHLORIDE 10 MG: 5 TABLET ORAL at 03:22

## 2020-08-22 RX ADMIN — NICOTINE 1 PATCH: 14 PATCH, EXTENDED RELEASE TRANSDERMAL at 08:26

## 2020-08-22 RX ADMIN — ACETAMINOPHEN 1000 MG: 500 TABLET ORAL at 11:02

## 2020-08-22 RX ADMIN — BISACODYL 10 MG: 10 SUPPOSITORY RECTAL at 20:18

## 2020-08-22 RX ADMIN — PRAVASTATIN SODIUM 40 MG: 40 TABLET ORAL at 20:17

## 2020-08-22 RX ADMIN — KETOROLAC TROMETHAMINE 10 MG: 10 TABLET, FILM COATED ORAL at 03:22

## 2020-08-22 RX ADMIN — ACETAMINOPHEN 1000 MG: 500 TABLET ORAL at 05:04

## 2020-08-22 RX ADMIN — OXYCODONE HYDROCHLORIDE 5 MG: 5 TABLET ORAL at 15:13

## 2020-08-22 RX ADMIN — ONDANSETRON 4 MG: 2 INJECTION INTRAMUSCULAR; INTRAVENOUS at 20:18

## 2020-08-22 RX ADMIN — ONDANSETRON HYDROCHLORIDE 4 MG: 4 TABLET, FILM COATED ORAL at 08:20

## 2020-08-22 RX ADMIN — FLUTICASONE PROPIONATE 1 SPRAY: 50 SPRAY, METERED NASAL at 08:21

## 2020-08-22 RX ADMIN — OXYCODONE HYDROCHLORIDE 10 MG: 5 TABLET ORAL at 09:43

## 2020-08-22 RX ADMIN — FUROSEMIDE 20 MG: 10 INJECTION, SOLUTION INTRAMUSCULAR; INTRAVENOUS at 10:58

## 2020-08-22 RX ADMIN — KETOROLAC TROMETHAMINE 10 MG: 10 TABLET, FILM COATED ORAL at 10:58

## 2020-08-22 RX ADMIN — TEMAZEPAM 15 MG: 15 CAPSULE ORAL at 21:56

## 2020-08-22 RX ADMIN — DOCUSATE SODIUM 50 MG AND SENNOSIDES 8.6 MG 2 TABLET: 8.6; 5 TABLET, FILM COATED ORAL at 20:18

## 2020-08-22 RX ADMIN — OXYCODONE HYDROCHLORIDE 5 MG: 5 TABLET ORAL at 20:18

## 2020-08-22 RX ADMIN — ACETAMINOPHEN 1000 MG: 500 TABLET ORAL at 17:15

## 2020-08-22 RX ADMIN — ESCITALOPRAM OXALATE 20 MG: 10 TABLET ORAL at 08:20

## 2020-08-22 RX ADMIN — POLYETHYLENE GLYCOL 3350 17 G: 17 POWDER, FOR SOLUTION ORAL at 08:20

## 2020-08-22 NOTE — PROGRESS NOTES
"  Cardiothoracic - Vascular Surgery Daily Note        LOS: 2 days   Patient Care Team:  Rosario Mckeon MD as PCP - General (Family Medicine)    POD#2 LEFT VATS. Lower lobectomy    Chief Complaint: Lung Nodule      Subjective     The following portions of the patient's history were reviewed and updated as appropriate: allergies, current medications, past family history, past medical history, past social history, past surgical history and problem list.     Subjective:  Symptoms:  Stable.    Diet:  Adequate intake.    Activity level: Returning to normal.    Pain:  She complains of pain that is mild.  Pain is well controlled and requiring pain medication.          Objective     Vital Signs  Temp:  [97.3 °F (36.3 °C)-98 °F (36.7 °C)] 97.3 °F (36.3 °C)  Heart Rate:  [63-76] 74  Resp:  [15-22] 18  BP: (108-130)/(58-74) 130/60  Body mass index is 23.38 kg/m².    Intake/Output Summary (Last 24 hours) at 8/22/2020 1003  Last data filed at 8/22/2020 0503  Gross per 24 hour   Intake 960 ml   Output 2120 ml   Net -1160 ml     No intake/output data recorded.    Wt Readings from Last 3 Encounters:   08/22/20 59.9 kg (132 lb)   08/17/20 57.6 kg (127 lb)   08/10/20 57.7 kg (127 lb 3.2 oz)     CT 490cc serosanginous  No air leak    Physical Exam   Objective:  General Appearance:  Well-appearing, comfortable, in no acute distress and in pain.    Vital signs: (most recent): Blood pressure 130/60, pulse 74, temperature 97.3 °F (36.3 °C), temperature source Oral, resp. rate 18, height 160 cm (63\"), weight 59.9 kg (132 lb), SpO2 90 %.  Vital signs are normal.    Output: Producing urine and no stool output.    HEENT: Normal HEENT exam.    Lungs:  Normal effort and normal respiratory rate.  Breath sounds clear to auscultation.  (CT no air leak serous drainage around CT site)  Heart: Normal rate.  Regular rhythm.    Abdomen: Abdomen is soft.  Bowel sounds are normal.     Extremities: Normal range of motion.    Neurological: Patient is " alert and oriented to person, place and time.  GCS score is 15.    Pupils:  Pupils are equal, round, and reactive to light.  Pupils are equal.   Skin:  Warm and dry.  ((L) Thor: Prevena Intact)              Results Review:    Lab Results   Component Value Date    WBC 9.18 08/21/2020    HGB 11.0 (L) 08/21/2020    HCT 31.3 (L) 08/21/2020    MCV 94.3 08/21/2020     08/21/2020     Lab Results   Component Value Date    GLUCOSE 93 08/21/2020    BUN 6 08/21/2020    CREATININE 0.67 08/21/2020    EGFRIFNONA 91 08/21/2020    BCR 9.0 08/21/2020    K 4.2 08/21/2020    CO2 23.0 08/21/2020    CALCIUM 7.9 (L) 08/21/2020       Calcium Calcium   Date/Time Value Ref Range Status   08/21/2020 0524 7.9 (L) 8.6 - 10.5 mg/dL Final      Magnesium No results found for: MG     Imaging Results (Last 24 Hours)     ** No results found for the last 24 hours. **                                  acetaminophen 1,000 mg Oral Q6H   escitalopram 20 mg Oral Daily   fluticasone 1 spray Nasal Daily   furosemide 20 mg Intravenous Once   ketorolac 10 mg Oral Q8H   nicotine 1 patch Transdermal Q24H   polyethylene glycol 17 g Oral Daily   pravastatin 40 mg Oral Nightly   sennosides-docusate 2 tablet Oral Nightly                ASSESSMENT/PLAN       Lung nodule      Assessment & Plan    Stable Post Op LEFT VATS: Progressing well    Lung Nodule: lower lobectomy. + adenocarcinoma. Await final path for staging    Respiratory: O2 support. Incentive. CT waterseal today, CXR in AM    Depression: home lexapro    Pain: Scheduled Tylenol. Oxycodone immediate release, acetominophen, toradol.     Rehab: OOB chair. Ambulate    Disposition: Continue care 3 [place].                This document has been electronically signed by MICHAEL Murdock on August 22, 2020 10:03

## 2020-08-22 NOTE — PLAN OF CARE
Problem: Patient Care Overview  Goal: Plan of Care Review  Outcome: Ongoing (interventions implemented as appropriate)         Drainage around CT site, serosanguineous. Wt down less than 1 lb. Pt requiring 1L NC to stay at or above 90% O2 saturation. Pt declines suppository at this point, states she will take one later on today. Educated on the importance of being up OOB. Pt has used IS and aerobika.

## 2020-08-23 ENCOUNTER — APPOINTMENT (OUTPATIENT)
Dept: GENERAL RADIOLOGY | Facility: HOSPITAL | Age: 55
End: 2020-08-23

## 2020-08-23 PROCEDURE — 71046 X-RAY EXAM CHEST 2 VIEWS: CPT

## 2020-08-23 PROCEDURE — 63710000001 ONDANSETRON PER 8 MG: Performed by: NURSE PRACTITIONER

## 2020-08-23 RX ORDER — FAMOTIDINE 20 MG/1
20 TABLET, FILM COATED ORAL
Status: DISCONTINUED | OUTPATIENT
Start: 2020-08-23 | End: 2020-08-24 | Stop reason: HOSPADM

## 2020-08-23 RX ADMIN — FLUTICASONE PROPIONATE 1 SPRAY: 50 SPRAY, METERED NASAL at 07:48

## 2020-08-23 RX ADMIN — ONDANSETRON HYDROCHLORIDE 4 MG: 4 TABLET, FILM COATED ORAL at 07:46

## 2020-08-23 RX ADMIN — OXYCODONE HYDROCHLORIDE 10 MG: 5 TABLET ORAL at 22:36

## 2020-08-23 RX ADMIN — NICOTINE 1 PATCH: 14 PATCH, EXTENDED RELEASE TRANSDERMAL at 07:46

## 2020-08-23 RX ADMIN — DOCUSATE SODIUM 50 MG AND SENNOSIDES 8.6 MG 2 TABLET: 8.6; 5 TABLET, FILM COATED ORAL at 20:33

## 2020-08-23 RX ADMIN — FAMOTIDINE 20 MG: 20 TABLET ORAL at 12:18

## 2020-08-23 RX ADMIN — OXYCODONE HYDROCHLORIDE 10 MG: 5 TABLET ORAL at 18:28

## 2020-08-23 RX ADMIN — KETOROLAC TROMETHAMINE 10 MG: 10 TABLET, FILM COATED ORAL at 05:07

## 2020-08-23 RX ADMIN — ACETAMINOPHEN 1000 MG: 500 TABLET ORAL at 23:26

## 2020-08-23 RX ADMIN — ACETAMINOPHEN 1000 MG: 500 TABLET ORAL at 17:23

## 2020-08-23 RX ADMIN — KETOROLAC TROMETHAMINE 10 MG: 10 TABLET, FILM COATED ORAL at 11:17

## 2020-08-23 RX ADMIN — ACETAMINOPHEN 1000 MG: 500 TABLET ORAL at 05:07

## 2020-08-23 RX ADMIN — FAMOTIDINE 20 MG: 20 TABLET ORAL at 17:54

## 2020-08-23 RX ADMIN — ACETAMINOPHEN 1000 MG: 500 TABLET ORAL at 11:17

## 2020-08-23 RX ADMIN — OXYCODONE HYDROCHLORIDE 5 MG: 5 TABLET ORAL at 01:53

## 2020-08-23 RX ADMIN — PRAVASTATIN SODIUM 40 MG: 40 TABLET ORAL at 20:34

## 2020-08-23 RX ADMIN — ESCITALOPRAM OXALATE 20 MG: 10 TABLET ORAL at 07:46

## 2020-08-23 RX ADMIN — TEMAZEPAM 15 MG: 15 CAPSULE ORAL at 23:26

## 2020-08-23 NOTE — PLAN OF CARE
Problem: Patient Care Overview  Goal: Plan of Care Review  Flowsheets (Taken 8/23/2020 1344)  Progress: no change  Plan of Care Reviewed With: patient  Outcome Summary: patient states she's feeling better.     Problem: Pain, Chronic (Adult)  Goal: Acceptable Pain/Comfort Level and Functional Ability  Flowsheets (Taken 8/23/2020 1344)  Acceptable Pain/Comfort Level and Functional Ability: making progress toward outcome     Problem: Lung Surgery (via Thoracotomy) (Adult)  Goal: Signs and Symptoms of Listed Potential Problems Will be Absent, Minimized or Managed (Lung Surgery)  Flowsheets (Taken 8/23/2020 1344)  Problems Assessed (Lung Surgery/Thoracotomy): all  Problems Present (Lung Surgery): none

## 2020-08-23 NOTE — PROGRESS NOTES
"  Cardiothoracic - Vascular Surgery Daily Note        LOS: 3 days   Patient Care Team:  Rosario Mckeon MD as PCP - General (Family Medicine)    POD#3 LEFT VATS. Lower lobectomy    Chief Complaint: Lung Nodule      Subjective     The following portions of the patient's history were reviewed and updated as appropriate: allergies, current medications, past family history, past medical history, past social history, past surgical history and problem list.     Subjective:  Symptoms:  Stable.    Diet:  Adequate intake.    Activity level: Returning to normal.    Pain:  She complains of pain that is mild.  Pain is well controlled and requiring pain medication.          Objective     Vital Signs  Temp:  [98 °F (36.7 °C)-98.7 °F (37.1 °C)] 98.6 °F (37 °C)  Heart Rate:  [] 73  Resp:  [18] 18  BP: (115-131)/(63-84) 121/66  Body mass index is 22.67 kg/m².    Intake/Output Summary (Last 24 hours) at 8/23/2020 0957  Last data filed at 8/23/2020 0510  Gross per 24 hour   Intake --   Output 130 ml   Net -130 ml     No intake/output data recorded.    Wt Readings from Last 3 Encounters:   08/23/20 58.1 kg (128 lb)   08/17/20 57.6 kg (127 lb)   08/10/20 57.7 kg (127 lb 3.2 oz)     CT 130cc serosanginous  No air leak    Physical Exam   Objective:  General Appearance:  Well-appearing, comfortable, in no acute distress and in pain.    Vital signs: (most recent): Blood pressure 121/66, pulse 73, temperature 98.6 °F (37 °C), temperature source Oral, resp. rate 18, height 160 cm (63\"), weight 58.1 kg (128 lb), SpO2 97 %.  Vital signs are normal.    Output: Producing urine and no stool output.    HEENT: Normal HEENT exam.    Lungs:  Normal effort and normal respiratory rate.  Breath sounds clear to auscultation.  (CT no air leak serous drainage around CT site)  Heart: Normal rate.  Regular rhythm.    Abdomen: Abdomen is soft.  Bowel sounds are normal.     Extremities: Normal range of motion.    Neurological: Patient is alert and " oriented to person, place and time.  GCS score is 15.    Pupils:  Pupils are equal, round, and reactive to light.  Pupils are equal.   Skin:  Warm and dry.  ((L) Thor: Prevena Intact)              Results Review:    Lab Results   Component Value Date    WBC 9.18 08/21/2020    HGB 11.0 (L) 08/21/2020    HCT 31.3 (L) 08/21/2020    MCV 94.3 08/21/2020     08/21/2020     Lab Results   Component Value Date    GLUCOSE 93 08/21/2020    BUN 6 08/21/2020    CREATININE 0.67 08/21/2020    EGFRIFNONA 91 08/21/2020    BCR 9.0 08/21/2020    K 4.2 08/21/2020    CO2 23.0 08/21/2020    CALCIUM 7.9 (L) 08/21/2020       Calcium Calcium   Date/Time Value Ref Range Status   08/21/2020 0524 7.9 (L) 8.6 - 10.5 mg/dL Final      Magnesium No results found for: MG     Imaging Results (Last 24 Hours)     Procedure Component Value Units Date/Time    XR Chest 2 View [[id number]] Collected:  08/23/20 0620     Updated:  08/23/20 0644    Narrative:           PROCEDURE: Chest PA and lateral    REASON FOR EXAM: CT to waterseal, R91.1 Solitary pulmonary nodule    FINDINGS: Comparison study dated August 20, 2020.  Left chest  tube in place. No pneumothorax. . Cardiac size appears within  normal limits. Small left lung base vague interstitial opacity.  Lungs are otherwise clear. Very small right pleural effusion.. No  acute osseous abnormality.      Impression:       1.  Left chest tube in place. No pneumothorax.  2.  Left lung base small vague interstitial opacity. This may  represent subsegmental atelectasis versus mild atypical pulmonary  edema versus less likely early pneumonia. Recommend clinical  correlation.  3.  Very small right pleural effusion.    Electronically signed by:  Bradley Sheppard MD  8/23/2020 6:43 AM CDT  Workstation: [id number]                                  acetaminophen 1,000 mg Oral Q6H   escitalopram 20 mg Oral Daily   fluticasone 1 spray Nasal Daily   ketorolac 10 mg Oral Q8H   nicotine 1 patch Transdermal Q24H      polyethylene glycol 17 g Oral Daily   pravastatin 40 mg Oral Nightly   sennosides-docusate 2 tablet Oral Nightly                ASSESSMENT/PLAN       Lung nodule      Assessment & Plan    Stable Post Op LEFT VATS: Progressing well    Lung Nodule: lower lobectomy. + adenocarcinoma. Await final path for staging    Respiratory: O2 support. Incentive. CXR stable, clamp CT tomorrow AM.    Depression: home lexapro    Pain: Scheduled Tylenol. Oxycodone immediate release, acetominophen, toradol.     Rehab: OOB chair. Ambulate    Disposition: Continue care [facility].  Possible D/C home tomorrow              This document has been electronically signed by MICHAEL Murdock on August 23, 2020 09:57

## 2020-08-24 ENCOUNTER — APPOINTMENT (OUTPATIENT)
Dept: GENERAL RADIOLOGY | Facility: HOSPITAL | Age: 55
End: 2020-08-24

## 2020-08-24 VITALS
HEIGHT: 63 IN | DIASTOLIC BLOOD PRESSURE: 66 MMHG | WEIGHT: 128.1 LBS | SYSTOLIC BLOOD PRESSURE: 122 MMHG | OXYGEN SATURATION: 94 % | RESPIRATION RATE: 18 BRPM | BODY MASS INDEX: 22.7 KG/M2 | TEMPERATURE: 97.3 F | HEART RATE: 78 BPM

## 2020-08-24 PROCEDURE — 71046 X-RAY EXAM CHEST 2 VIEWS: CPT

## 2020-08-24 PROCEDURE — 99024 POSTOP FOLLOW-UP VISIT: CPT | Performed by: NURSE PRACTITIONER

## 2020-08-24 PROCEDURE — 71045 X-RAY EXAM CHEST 1 VIEW: CPT

## 2020-08-24 RX ORDER — CEPHALEXIN 500 MG/1
500 CAPSULE ORAL 3 TIMES DAILY
Qty: 30 CAPSULE | Refills: 0 | Status: ON HOLD | OUTPATIENT
Start: 2020-08-24 | End: 2020-10-27

## 2020-08-24 RX ORDER — OXYCODONE HYDROCHLORIDE AND ACETAMINOPHEN 5; 325 MG/1; MG/1
2 TABLET ORAL EVERY 4 HOURS PRN
Qty: 36 TABLET | Refills: 0 | Status: SHIPPED | OUTPATIENT
Start: 2020-08-24 | End: 2020-10-02

## 2020-08-24 RX ORDER — FAMOTIDINE 20 MG/1
20 TABLET, FILM COATED ORAL
Qty: 60 TABLET | Refills: 0 | Status: SHIPPED | OUTPATIENT
Start: 2020-08-24 | End: 2020-10-13

## 2020-08-24 RX ORDER — AMOXICILLIN 250 MG
2 CAPSULE ORAL NIGHTLY
Start: 2020-08-24 | End: 2020-11-03

## 2020-08-24 RX ORDER — POLYETHYLENE GLYCOL 3350 17 G/17G
17 POWDER, FOR SOLUTION ORAL DAILY
Qty: 7 EACH | Refills: 0 | Status: SHIPPED | OUTPATIENT
Start: 2020-08-25 | End: 2020-10-28

## 2020-08-24 RX ADMIN — FAMOTIDINE 20 MG: 20 TABLET ORAL at 09:13

## 2020-08-24 RX ADMIN — NICOTINE 1 PATCH: 14 PATCH, EXTENDED RELEASE TRANSDERMAL at 09:13

## 2020-08-24 RX ADMIN — OXYCODONE HYDROCHLORIDE 10 MG: 5 TABLET ORAL at 10:23

## 2020-08-24 RX ADMIN — ACETAMINOPHEN 1000 MG: 500 TABLET ORAL at 05:56

## 2020-08-24 RX ADMIN — ESCITALOPRAM OXALATE 20 MG: 10 TABLET ORAL at 09:12

## 2020-08-24 RX ADMIN — OXYCODONE HYDROCHLORIDE 10 MG: 5 TABLET ORAL at 02:58

## 2020-08-24 RX ADMIN — POLYETHYLENE GLYCOL 3350 17 G: 17 POWDER, FOR SOLUTION ORAL at 09:13

## 2020-08-24 RX ADMIN — OXYCODONE HYDROCHLORIDE 10 MG: 5 TABLET ORAL at 06:36

## 2020-08-24 RX ADMIN — OXYCODONE HYDROCHLORIDE 10 MG: 5 TABLET ORAL at 14:40

## 2020-08-24 RX ADMIN — FLUTICASONE PROPIONATE 1 SPRAY: 50 SPRAY, METERED NASAL at 09:12

## 2020-08-24 RX ADMIN — ACETAMINOPHEN 1000 MG: 500 TABLET ORAL at 11:19

## 2020-08-24 RX ADMIN — SILVER SULFADIAZINE: 10 CREAM TOPICAL at 11:20

## 2020-08-24 NOTE — PLAN OF CARE
Problem: Patient Care Overview  Goal: Plan of Care Review  Outcome: Ongoing (interventions implemented as appropriate)  Flowsheets (Taken 8/24/2020 1342)  Progress: no change  Plan of Care Reviewed With: patient  Note:   Chest tube and ON-Q pump removed. Pt educated about incision care and wound vac. Vital signs stable.
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
## 2020-08-24 NOTE — DISCHARGE SUMMARY
CVTS DISCHARGE SUMMARY  Date of Admission: 8/20/2020  6:01 AM  Date of Discharge:  8/24/2020    Admission Diagnosis:   Lung nodule [R91.1]    Discharge Diagnosis:   Post-Op Diagnosis Codes:     * Lung nodule [R91.1]    Consults:   Consults     No orders found from 7/22/2020 to 8/21/2020.          Procedures Performed  Procedure(s):  LEFT THORACOSCOPY VIDEO ASSISTED THORACOTOMY pulmonary resection thoracic mediastinal lymphadenectomy bronchoscopy       Discharge Medications     Discharge Medications      New Medications      Instructions Start Date   cephalexin 500 MG capsule  Commonly known as:  KEFLEX   500 mg, Oral, 3 Times Daily      famotidine 20 MG tablet  Commonly known as:  PEPCID   20 mg, Oral, 2 Times Daily Before Meals      oxyCODONE-acetaminophen 5-325 MG per tablet  Commonly known as:  PERCOCET   2 tablets, Oral, Every 4 Hours PRN      polyethylene glycol 17 g pack packet  Commonly known as:  MIRALAX   17 g, Oral, Daily   Start Date:  August 25, 2020     sennosides-docusate 8.6-50 MG per tablet  Commonly known as:  PERICOLACE   2 tablets, Oral, Nightly      silver sulfadiazine 1 % cream  Commonly known as:  SILVADENE, SSD   Topical, Every 24 Hours Scheduled   Start Date:  August 25, 2020        Continue These Medications      Instructions Start Date   [employer] NICOTINE TRANSDERMAL SYS 14 MG/24HR patch  Generic drug:  nicotine   1 patch, Transdermal, Every 24 Hours      escitalopram 20 MG tablet  Commonly known as:  LEXAPRO   20 mg, Oral, Daily      fluticasone 50 MCG/ACT nasal spray  Commonly known as:  FLONASE   1 spray, Nasal, Daily      nicotine polacrilex 4 MG gum  Commonly known as:  NICORETTE   4 mg, Mouth/Throat, As Needed      pravastatin 40 MG tablet  Commonly known as:  PRAVACHOL   40 mg, Oral, Nightly      temazepam 15 MG capsule  Commonly known as:  RESTORIL   15 mg, Oral, Nightly PRN           Ruben Hammer APRN Reviewed risks, benefits, and habit forming potential and weaning from narcotic  medication. Patient understands and wishes to receive prescription.  Prescription written for percocet for post-surgical pain after Peter placed on file.    Discharge Diet:   Diet Instructions     Diet: Regular, Consistent Carbohydrate, Cardiac      Discharge Diet:   Regular  Consistent Carbohydrate  Cardiac             Discharge Disposition: Home in stable condition with follow up appointments with CVTS Nurse Practitioner 1 week, Dr. Christiansen 2 weeks, Cardiology/PCP as scheduled.     History of Present Illness/Hospital Course:   HPI:      PCP:  Rosario Mckeon MD  Ortho: Dr. Gaines    55 y.o. female with HTN(stable, increased risk stroke, rupture) and Smoker(uncontrolled, increased risk cardiovascular events) insomnia, isolated pulmonary nodule (new, increased risk malignancy).  smokes 0.25 PPD.  Established with [facility] for LLL pulmonary nodule, PET/CT positive.  Underwent PFT, preoperative evaluation, surgical resection was advisable.  No other associated signs, symptoms or modifying factors.    Adequate preop studies were completed and the patient was scheduled electively. Operative day Alysia Deutsch admitted through [facility], taken to operating suite and placed under general anesthesia.  Underwent said procedure without complications or difficulty. They were weaned easily from mechanical ventilation and extubated in the recovery room placed on oxygen per nasal cannula and further transferred to CCU for further care and monitoring. Alysia Deutsch remained hemodynamically and neurovascularly stable immediately postop.  POD1 they were out of the bed to the chair tolerating breakfast pain controlled with dilaudid PCA and stable for transfer to 3W telemetry.  Alysia Deutsch continued with PT/OT progressing well with aggressive pulmonary toilet and weaning oxygen.  Operative incisions clean, dry, intact. Neurovascular and respiratory status remained stable with CT discontinuation and satisfactory post  "removal CXR viewed on 8/24 demonstrating no pneumothorax.  Alyisa Deutsch is stable today for discharge home with follow up appointments.     Vital Signs  Temp:  [97.5 °F (36.4 °C)-98 °F (36.7 °C)] 97.5 °F (36.4 °C)  Heart Rate:  [64-80] 80  Resp:  [18] 18  BP: (118-135)/(62-79) 125/67      08/22/20  0503 08/23/20  0506 08/24/20  0308   Weight: 59.9 kg (132 lb) 58.1 kg (128 lb) 58.1 kg (128 lb 1.6 oz)       Pertinent Test Results:  Lab Results   Component Value Date    WBC 9.18 08/21/2020    HGB 11.0 (L) 08/21/2020    HCT 31.3 (L) 08/21/2020    MCV 94.3 08/21/2020     08/21/2020     Lab Results   Component Value Date    GLUCOSE 93 08/21/2020    BUN 6 08/21/2020    CREATININE 0.67 08/21/2020    EGFRIFNONA 91 08/21/2020    BCR 9.0 08/21/2020    K 4.2 08/21/2020    CO2 23.0 08/21/2020    CALCIUM 7.9 (L) 08/21/2020     Physical Exam   Objective:  General Appearance:  Well-appearing, comfortable, in no acute distress and in pain.    Vital signs: (most recent): Blood pressure 125/67, pulse 80, temperature 97.5 °F (36.4 °C), temperature source Temporal, resp. rate 18, height 160 cm (63\"), weight 58.1 kg (128 lb 1.6 oz), SpO2 94 %.  Vital signs are normal.    Output: Producing urine and BM 8/23   HEENT: Normal HEENT exam.    Lungs:  Normal effort and normal respiratory rate.  Breath sounds clear to auscultation.  CT removed  Heart: Normal rate.  Regular rhythm.    Abdomen: Abdomen is soft.  Bowel sounds are normal.     Extremities: Normal range of motion.    Neurological: Patient is alert and oriented to person, place and time.  GCS score is 15.    Pupils:  Pupils are equal, round, and reactive to light.  Pupils are equal.   Skin:  Warm and dry.  ((L) Thor: Prevena Intact)    Additional Instructions for the Follow-ups that You Need to Schedule     Call MD With Problems / Concerns   As directed      Instructions: notify provider for uncontrolled pain, shortness of breath, fever/chills, or surgical incision drainage "    Order Comments:  Instructions: notify provider for uncontrolled pain, shortness of breath, fever/chills, or surgical incision drainage          Discharge Follow-up with PCP   As directed       Currently Documented PCP:    Rosario Mckeon MD    PCP Phone Number:    [phone]     Follow Up Details:  as scheduled         Discharge Follow-up with Specified Provider: Hai RODRIGUEZ; 1 Week   As directed      To:  Hai RODRIGUEZ    Follow Up:  1 Week    Follow Up Details:  next monday         Referral to Home Health   As directed      Face to Face Visit Date:  8/24/2020    Follow-up provider for Plan of Care?:  I will be treating the patient on an ongoing basis.  Please send me the Plan of Care for signature.    Follow-up provider:  LISA CONSTANTINO [[id number]]    Reason/Clinical Findings:  PO VATS    Describe mobility limitations that make leaving home difficult:  restrictions    Nursing/Therapeutic Services Requested:  Other (transition visit)    Frequency:  1 Week 1               Discharge Instructions: Discharge instructions include no heavy lifting anything greater than 10lbs with the left arm for approximately 4 weeks.  No sex or driving for 3-6 weeks. Patient is to continue with Incentive Spirometry 4 times daily while awake at home as well as any prescribed respiratory treatments. Printed information given to the patient with advancement of activities weekly.  Risks and benefits of narcotic medications and weaning postoperatively have been discussed. Clean operative site with antibacterial soap/water, pat dry. Keep open to air unless draining, then may apply dry dressing.  No ointments or creams unless prescribed by provider. Signs and symptoms of infection including drainage from operative site, redness, swelling, with associated fever and/or chills notify [facility] and Vascular center immediately for wound check.  Patient verbalizes understanding of discharge instructions, all questions are answered,  follow up appointments have been made, they are discharged home in stable condition.           Follow-up Appointments  Future Appointments   Date Time Provider Department Center   8/31/2020  2:30 PM Ruben Hammer, MICHAEL [facility] None       Test Results Pending at Discharge   Order Current Status    Tissue Pathology Exam In process               This document has been electronically signed by KRISTEN Zafar-BC @  On August 24, 2020 13:29      Time: Discharge 45 min

## 2020-08-25 ENCOUNTER — READMISSION MANAGEMENT (OUTPATIENT)
Dept: CALL CENTER | Facility: HOSPITAL | Age: 55
End: 2020-08-25

## 2020-08-25 NOTE — PAYOR COMM NOTE
"Bette Crawford  [facility]  P :[phone]  F: [phone]    [facility]#[id number]    Alysia Deutsch (55 y.o. Female)     Date of Birth Social Security Number Address Home Phone MRN    1965   [address]  [place] [address] [phone] [id number]    Islam Marital Status          [employer]        Admission Date Admission Type Admitting Provider Attending Provider Department, Room/Bed    8/20/20 Elective Horace Christiansen MD  [facility] 3 [place], 312/1    Discharge Date Discharge Disposition Discharge Destination        8/24/2020 Home or Self Care              Attending Provider:  (none)   Allergies:  Codeine    Isolation:  None   Infection:  None   Code Status:  Prior    Ht:  160 cm (63\")   Wt:  58.1 kg (128 lb 1.6 oz)    Admission Cmt:  None   Principal Problem:  Lung nodule [R91.1]                 Active Insurance as of 8/20/2020     Primary Coverage     Payor Plan Insurance Group Employer/Plan Group    ANTH [employer] [facility] [facility] [employer] [id number]     Payor Plan Address Payor Plan Phone Number Payor Plan Fax Number Effective Dates    [address] [phone]  1/1/2013 - None Entered    [place] [id number]       Subscriber Name Subscriber Birth Date Member ID       ALYSIA DEUTSCH 1965 [id number]                 Emergency Contacts      (Rel.) Home Phone Work Phone Mobile Phone    Deanna Ochoa (Sister) [phone] -- [phone]    Elise Mcintyre (Mother) [phone] -- --               Discharge Summary      Ruben Hammer, MICHAEL at 08/24/20 1325          CVTS DISCHARGE SUMMARY  Date of Admission: 8/20/2020  6:01 AM  Date of Discharge:  8/24/2020    Admission Diagnosis:   Lung nodule [R91.1]    Discharge Diagnosis:   Post-Op Diagnosis Codes:     * Lung nodule [R91.1]    Consults:   Consults     No orders found from 7/22/2020 to 8/21/2020.          Procedures Performed  Procedure(s):  LEFT THORACOSCOPY " VIDEO ASSISTED THORACOTOMY pulmonary resection thoracic mediastinal lymphadenectomy bronchoscopy       Discharge Medications     Discharge Medications      New Medications      Instructions Start Date   cephalexin 500 MG capsule  Commonly known as:  KEFLEX   500 mg, Oral, 3 Times Daily      famotidine 20 MG tablet  Commonly known as:  PEPCID   20 mg, Oral, 2 Times Daily Before Meals      oxyCODONE-acetaminophen 5-325 MG per tablet  Commonly known as:  PERCOCET   2 tablets, Oral, Every 4 Hours PRN      polyethylene glycol 17 g pack packet  Commonly known as:  MIRALAX   17 g, Oral, Daily   Start Date:  August 25, 2020     sennosides-docusate 8.6-50 MG per tablet  Commonly known as:  PERICOLACE   2 tablets, Oral, Nightly      silver sulfadiazine 1 % cream  Commonly known as:  SILVADENE, SSD   Topical, Every 24 Hours Scheduled   Start Date:  August 25, 2020        Continue These Medications      Instructions Start Date   [employer] NICOTINE TRANSDERMAL SYS 14 MG/24HR patch  Generic drug:  nicotine   1 patch, Transdermal, Every 24 Hours      escitalopram 20 MG tablet  Commonly known as:  LEXAPRO   20 mg, Oral, Daily      fluticasone 50 MCG/ACT nasal spray  Commonly known as:  FLONASE   1 spray, Nasal, Daily      nicotine polacrilex 4 MG gum  Commonly known as:  NICORETTE   4 mg, Mouth/Throat, As Needed      pravastatin 40 MG tablet  Commonly known as:  PRAVACHOL   40 mg, Oral, Nightly      temazepam 15 MG capsule  Commonly known as:  RESTORIL   15 mg, Oral, Nightly PRN           Ruben RODRIGUEZ Reviewed risks, benefits, and habit forming potential and weaning from narcotic medication. Patient understands and wishes to receive prescription.  Prescription written for percocet for post-surgical pain after Peter placed on file.    Discharge Diet:   Diet Instructions     Diet: Regular, Consistent Carbohydrate, Cardiac      Discharge Diet:   Regular  Consistent Carbohydrate  Cardiac             Discharge Disposition: Home in  stable condition with follow up appointments with CVTS Nurse Practitioner 1 week, Dr. Christiansen 2 weeks, Cardiology/PCP as scheduled.     History of Present Illness/Hospital Course:   HPI:      PCP:  Rosario Mckeon MD  Ortho: Dr. Gaines    55 y.o. female with HTN(stable, increased risk stroke, rupture) and Smoker(uncontrolled, increased risk cardiovascular events) insomnia, isolated pulmonary nodule (new, increased risk malignancy).  smokes 0.25 PPD.  Established with CTVS for LLL pulmonary nodule, PET/CT positive.  Underwent PFT, preoperative evaluation, surgical resection was advisable.  No other associated signs, symptoms or modifying factors.    Adequate preop studies were completed and the patient was scheduled electively. Operative day Alysia Deutsch admitted through [facility], taken to operating suite and placed under general anesthesia.  Underwent said procedure without complications or difficulty. They were weaned easily from mechanical ventilation and extubated in the recovery room placed on oxygen per nasal cannula and further transferred to CCU for further care and monitoring. Alysia Deutsch remained hemodynamically and neurovascularly stable immediately postop.  POD1 they were out of the bed to the chair tolerating breakfast pain controlled with dilaudid PCA and stable for transfer to 3W telemetry.  Alysia Deutsch continued with PT/OT progressing well with aggressive pulmonary toilet and weaning oxygen.  Operative incisions clean, dry, intact. Neurovascular and respiratory status remained stable with CT discontinuation and satisfactory post removal CXR viewed on 8/24 demonstrating no pneumothorax.  Alysia Deutsch is stable today for discharge home with follow up appointments.     Vital Signs  Temp:  [97.5 °F (36.4 °C)-98 °F (36.7 °C)] 97.5 °F (36.4 °C)  Heart Rate:  [64-80] 80  Resp:  [18] 18  BP: (118-135)/(62-79) 125/67      08/22/20  0503 08/23/20  0506 08/24/20  0308   Weight: 59.9 kg  "(132 lb) 58.1 kg (128 lb) 58.1 kg (128 lb 1.6 oz)       Pertinent Test Results:  Lab Results   Component Value Date    WBC 9.18 08/21/2020    HGB 11.0 (L) 08/21/2020    HCT 31.3 (L) 08/21/2020    MCV 94.3 08/21/2020     08/21/2020     Lab Results   Component Value Date    GLUCOSE 93 08/21/2020    BUN 6 08/21/2020    CREATININE 0.67 08/21/2020    EGFRIFNONA 91 08/21/2020    BCR 9.0 08/21/2020    K 4.2 08/21/2020    CO2 23.0 08/21/2020    CALCIUM 7.9 (L) 08/21/2020     Physical Exam   Objective:  General Appearance:  Well-appearing, comfortable, in no acute distress and in pain.    Vital signs: (most recent): Blood pressure 125/67, pulse 80, temperature 97.5 °F (36.4 °C), temperature source Temporal, resp. rate 18, height 160 cm (63\"), weight 58.1 kg (128 lb 1.6 oz), SpO2 94 %.  Vital signs are normal.    Output: Producing urine and  BM 8/23   HEENT: Normal HEENT exam.    Lungs:  Normal effort and normal respiratory rate.  Breath sounds clear to auscultation.   CT removed  Heart: Normal rate.  Regular rhythm.    Abdomen: Abdomen is soft.  Bowel sounds are normal.     Extremities: Normal range of motion.    Neurological: Patient is alert and oriented to person, place and time.  GCS score is 15.    Pupils:  Pupils are equal, round, and reactive to light.  Pupils are equal.   Skin:  Warm and dry.  ((L) Thor: Prevena Intact)    Additional Instructions for the Follow-ups that You Need to Schedule     Call MD With Problems / Concerns   As directed      Instructions: notify provider for uncontrolled pain, shortness of breath, fever/chills, or surgical incision drainage    Order Comments:  Instructions: notify provider for uncontrolled pain, shortness of breath, fever/chills, or surgical incision drainage          Discharge Follow-up with PCP   As directed       Currently Documented PCP:    Rosario Mckeon MD    PCP Phone Number:    [phone]     Follow Up Details:  as scheduled         Discharge Follow-up with " Specified Provider: Hai RODRIGUEZ; 1 Week   As directed      To:  Hai RODRIGUEZ    Follow Up:  1 Week    Follow Up Details:  next monday         Referral to Home Health   As directed      Face to Face Visit Date:  8/24/2020    Follow-up provider for Plan of Care?:  I will be treating the patient on an ongoing basis.  Please send me the Plan of Care for signature.    Follow-up provider:  LISA CONSTANTINO [[id number]]    Reason/Clinical Findings:  PO VATS    Describe mobility limitations that make leaving home difficult:  restrictions    Nursing/Therapeutic Services Requested:  Other (transition visit)    Frequency:  1 Week 1               Discharge Instructions: Discharge instructions include no heavy lifting anything greater than 10lbs with the left arm for approximately 4 weeks.  No sex or driving for 3-6 weeks. Patient is to continue with Incentive Spirometry 4 times daily while awake at home as well as any prescribed respiratory treatments. Printed information given to the patient with advancement of activities weekly.  Risks and benefits of narcotic medications and weaning postoperatively have been discussed. Clean operative site with antibacterial soap/water, pat dry. Keep open to air unless draining, then may apply dry dressing.  No ointments or creams unless prescribed by provider. Signs and symptoms of infection including drainage from operative site, redness, swelling, with associated fever and/or chills notify Heart and Vascular center immediately for wound check.  Patient verbalizes understanding of discharge instructions, all questions are answered, follow up appointments have been made, they are discharged home in stable condition.           Follow-up Appointments  Future Appointments   Date Time Provider Department Center   8/31/2020  2:30 PM Ruben Hammer APRN [facility] None       Test Results Pending at Discharge   Order Current Status    Tissue Pathology Exam In process               This  document has been electronically signed by Hai Hammer AGACNP-BC @  On August 24, 2020 13:29      Time: Discharge 45 min              Electronically signed by Ruben Hammer APRN at 08/24/20 1329       Discharge Order (From admission, onward)     Start     Ordered    08/24/20 1258  Discharge patient  Once     Expected Discharge Date:  08/24/20    Discharge Disposition:  Home or Self Care    Physician of Record for Attribution - Please select from Treatment Team:  LISA CONSTANTINO [[id number]]    Review needed by CMO to determine Physician of Record:  No       Question Answer Comment   Physician of Record for Attribution - Please select from Treatment Team LISA CONSTANTINO    Review needed by CMO to determine Physician of Record No        08/24/20 3997

## 2020-08-25 NOTE — OUTREACH NOTE
Prep Survey      Responses   [facility] facility patient discharged from?  [place]   Is LACE score < 7 ?  No   Eligibility  Readm Mgmt   Discharge diagnosis  Lung nodule [s/p LEFT THORACOSCOPY VIDEO ASSISTED THORACOTOMY pulmonary resection thoracic mediastinal lymphadenectomy bronchoscopy]   COVID-19 Test Status  Negative   Does the patient have one of the following disease processes/diagnoses(primary or secondary)?  Cardiothoracic surgery   Does the patient have Home health ordered?  Yes   What is the Home health agency?   [facility] [place]   Comments regarding appointments  Per AVS   Prep survey completed?  Yes          Laura Merritt RN

## 2020-08-27 LAB
LAB AP CASE REPORT: NORMAL
PATH REPORT.FINAL DX SPEC: NORMAL

## 2020-08-31 ENCOUNTER — OFFICE VISIT (OUTPATIENT)
Dept: CARDIAC SURGERY | Facility: CLINIC | Age: 55
End: 2020-08-31

## 2020-08-31 VITALS
HEART RATE: 80 BPM | DIASTOLIC BLOOD PRESSURE: 62 MMHG | TEMPERATURE: 98.2 F | HEIGHT: 63 IN | SYSTOLIC BLOOD PRESSURE: 118 MMHG | OXYGEN SATURATION: 98 % | WEIGHT: 126 LBS | BODY MASS INDEX: 22.32 KG/M2

## 2020-08-31 DIAGNOSIS — Z48.813 SURGICAL AFTERCARE, RESPIRATORY SYSTEM: ICD-10-CM

## 2020-08-31 DIAGNOSIS — R91.1 LUNG NODULE: Primary | ICD-10-CM

## 2020-08-31 DIAGNOSIS — C34.92 ADENOCARCINOMA OF LEFT LUNG (HCC): ICD-10-CM

## 2020-08-31 PROCEDURE — 99024 POSTOP FOLLOW-UP VISIT: CPT | Performed by: NURSE PRACTITIONER

## 2020-08-31 RX ORDER — CYCLOBENZAPRINE HCL 10 MG
10 TABLET ORAL 3 TIMES DAILY PRN
Qty: 30 TABLET | Refills: 0 | Status: SHIPPED | OUTPATIENT
Start: 2020-08-31 | End: 2020-11-03

## 2020-09-01 ENCOUNTER — READMISSION MANAGEMENT (OUTPATIENT)
Dept: CALL CENTER | Facility: HOSPITAL | Age: 55
End: 2020-09-01

## 2020-09-01 NOTE — PROGRESS NOTES
CVTS Office Progress Note     9/1/2020    Alysia Deutsch  1965    Chief Complaint:    Chief Complaint   Patient presents with   • Follow-up     1 wk post op        HPI:       PCP:  Rosario Mckeon MD  Ortho: Dr. Gaines     55 y.o. female with HTN(stable, increased risk stroke, rupture) and Smoker(uncontrolled, increased risk cardiovascular events) insomnia, isolated pulmonary nodule (new, increased risk malignancy).  smokes 0.25 PPD.  Established with CTVS for LLL pulmonary nodule, PET/CT positive.  Underwent PFT, preoperative evaluation, surgical resection was advisable.  Underwent Left lower lobectomy, 8/2020, surgical margins, lymph nodes negative.  Returns today in post surgical follow up.  Pain controlled, some muscle spasms at bedtime.  No other associated signs, symptoms or modifying factors.    8/2020 CT/PET: LLL nodule 2.4x1.6 SUB 5.43  8/2020 L VATS Left lower lobectomy  8/2020 Surgical Pathology: Adenocarcinoma, lymph nodes and surgical margins negative for neoplasia.  PT1B reviewed by Dr. Christiansen    The following portions of the patient's history were reviewed and updated as appropriate: allergies, current medications, past family history, past medical history, past social history, past surgical history and problem list.  Recent images independently reviewed.  Available laboratory values reviewed.    PMH:  Past Medical History:   Diagnosis Date   • Back pain    • Hepatitis    • Lung mass    • PONV (postoperative nausea and vomiting)      Past Surgical History:   Procedure Laterality Date   • BREAST AUGMENTATION     • LAPAROSCOPIC TUBAL LIGATION     • THORACOSCOPY Left 8/20/2020    Procedure: LEFT THORACOSCOPY VIDEO ASSISTED THORACOTOMY pulmonary resection thoracic mediastinal lymphadenectomy bronchoscopy;  Surgeon: Horace Christiansen MD;  Location: [facility];  Service: Cardiothoracic;  Laterality: Left;  [place] [id number]  VATS [id number]     Family History   Problem Relation Age  "of Onset   • Diabetes Other    • Cancer Other    • No Known Problems Mother    • Heart disease Father      Social History     Tobacco Use   • Smoking status: Former Smoker     Packs/day: 0.25     Years: 30.00     Pack years: 7.50     Types: Cigarettes   • Smokeless tobacco: Never Used   • Tobacco comment: 8-17-20 (\"DOWN TO ONE PER DAY\"   Substance Use Topics   • Alcohol use: Not Currently   • Drug use: Never       ALLERGIES:  Allergies   Allergen Reactions   • Codeine Hives and Itching   • Adhesive Tape Rash         MEDICATIONS:    Current Outpatient Medications:   •  cephalexin (KEFLEX) 500 MG capsule, Take 1 capsule by mouth 3 (Three) Times a Day., Disp: 30 capsule, Rfl: 0  •  escitalopram (LEXAPRO) 20 MG tablet, Take 20 mg by mouth Daily., Disp: , Rfl:   •  famotidine (PEPCID) 20 MG tablet, Take 1 tablet by mouth 2 (Two) Times a Day Before Meals., Disp: 60 tablet, Rfl: 0  •  fluticasone (FLONASE) 50 MCG/ACT nasal spray, 1 spray into the nostril(s) as directed by provider Daily., Disp: , Rfl:   •  nicotine (CVS NICOTINE TRANSDERMAL SYS) 14 MG/24HR patch, Place 1 patch on the skin as directed by provider Daily., Disp: , Rfl:   •  nicotine polacrilex (NICORETTE) 4 MG gum, Chew 4 mg As Needed for Smoking Cessation., Disp: , Rfl:   •  oxyCODONE-acetaminophen (PERCOCET) 5-325 MG per tablet, Take 2 tablets by mouth Every 4 (Four) Hours As Needed for post-surgical pain., Disp: 36 tablet, Rfl: 0  •  polyethylene glycol (MIRALAX) 17 g pack packet, Dissolve 1 packet (17 g) in 4 to 8 ounces of liquid and drink by mouth Daily., Disp: 7 each, Rfl: 0  •  pravastatin (PRAVACHOL) 40 MG tablet, Take 40 mg by mouth Every Night., Disp: , Rfl:   •  sennosides-docusate (senna-docusate sodium) 8.6-50 MG per tablet, Take 2 tablets by mouth Every Night., Disp: , Rfl:   •  silver sulfadiazine (SILVADENE, SSD) 1 % cream, Apply topically to the appropriate area as directed once daily., Disp: 50 g, Rfl: 0  •  cyclobenzaprine (FLEXERIL) 10 MG " "tablet, Take 1 tablet by mouth 3 (Three) Times a Day As Needed for Muscle Spasms., Disp: 30 tablet, Rfl: 0      Review of Systems   Constitution: Negative for chills, decreased appetite, fever and weight loss.   HENT: Negative for congestion, nosebleeds and sore throat.    Eyes: Negative for blurred vision, visual disturbance and visual halos.   Cardiovascular: Positive for dyspnea on exertion. Negative for chest pain and leg swelling.   Respiratory: Negative for cough, shortness of breath, sputum production and wheezing.    Endocrine: Negative for cold intolerance and polyuria.   Hematologic/Lymphatic: Negative for bleeding problem. Does not bruise/bleed easily.   Skin: Positive for unusual hair distribution. Negative for flushing and nail changes.   Musculoskeletal: Positive for back pain. Negative for arthritis and joint pain.   Gastrointestinal: Negative for bloating, abdominal pain, hematemesis, melena, nausea and vomiting.   Genitourinary: Negative for flank pain and hematuria.   Neurological: Negative for brief paralysis, difficulty with concentration, focal weakness, light-headedness, loss of balance, numbness, paresthesias and weakness.        No amaurosis fugax, TIA, or CVA.     Psychiatric/Behavioral: Negative for altered mental status, depression, substance abuse and suicidal ideas.   Allergic/Immunologic: Negative for hives and persistent infections.         Vitals:    08/31/20 1457   BP: 118/62   BP Location: Left arm   Patient Position: Sitting   Cuff Size: Adult   Pulse: 80   Temp: 98.2 °F (36.8 °C)   TempSrc: Temporal   SpO2: 98%   Weight: 57.2 kg (126 lb)   Height: 160 cm (63\")     Physical Exam   Constitutional: She is oriented to person, place, and time. She appears well-developed and well-nourished.   HENT:   Head: Normocephalic and atraumatic.   Mouth/Throat: Oropharynx is clear and moist.   Eyes: Pupils are equal, round, and reactive to light. Conjunctivae and EOM are normal.   Neck: Normal " range of motion. Neck supple.   Cardiovascular: Normal rate and intact distal pulses.   Pulmonary/Chest: Effort normal and breath sounds normal. No respiratory distress.   Abdominal: Soft. Bowel sounds are normal. She exhibits no distension.   Musculoskeletal: Normal range of motion. She exhibits no edema.   Neurological: She is alert and oriented to person, place, and time. No cranial nerve deficit.   Skin: Skin is warm and dry. Capillary refill takes 2 to 3 seconds.   Left thoracotomy incision CDI  CT sutures removed   Psychiatric: She has a normal mood and affect. Judgment normal.   Nursing note and vitals reviewed.      Assessment & Plan     Independent Review of Studies    1. Lung nodule    - CT Chest Without Contrast; Future    2. Adenocarcinoma of left lung (CMS/HCC)  Stage 1 NSCLC.  Status post surgical resection.  Path reviewed by Dr. Christiansen and discussed  Repeat CT of chest without contrast in 6 months, follow up with Dr. Christiansen to review.     3. Surgical aftercare, respiratory system  Lifting restrictions 10lbs left arm x 4weeks  Follow up one week chest X-Ray wound check  Flexeril added for muscle spasms/pain at bedtime.   Incisions CDI, CT sutures removed    - XR Chest 2 View; Future    Detailed discussion regarding risks, benefits, and treatment plan. Images independently reviewed. Patient understands, agrees, and wishes to proceed with plan.       This document has been electronically signed by MARLEN ZafarCNP-BC @  On September 1, 2020 16:31      EMR Dragon/Transcription disclaimer:   Much of this encounter note is an electronic transcription/translation of spoken language to printed text. The electronic translation of spoken language may permit erroneous, or at times, nonsensical words or phrases to be inadvertently transcribed; Although I have reviewed the note for such errors, some may still exist.

## 2020-09-01 NOTE — OUTREACH NOTE
CT Surgery Week 1 Survey      Responses   [facility] patient discharged from?  [facility]   Does the patient have one of the following disease processes/diagnoses(primary or secondary)?  Cardiothoracic surgery   Is there a successful TCM telephone encounter documented?  No   Week 1 attempt successful?  Yes   Call start time  0917   Call end time  0922   Is patient permission given to speak with other caregiver?  No   Medication alerts for this patient  Pt continues to take pain medication only at night.   Meds reviewed with patient/caregiver?  Yes   Is the patient having any side effects they believe may be caused by any medication additions or changes?  No   Does the patient have all medications related to this admission filled (includes all antibiotics, pain medications, cardiac medications, etc.)  Yes   Is the patient taking all medications as directed (includes completed medication regime)?  Yes   Comments regarding appointments  Pt saw the surgeon yesterday.   Does the patient have an appointment scheduled with their C/T surgeon?  Yes   Has the patient kept scheduled appointments due by today?  Yes   What is the Home health agency?   [facility] [place]   Home health comments  HH came one day and felt she did not need any assistance.   Psychosocial issues?  No   Did the patient receive a copy of their discharge instructions?  Yes   Nursing interventions  Reviewed instructions with patient   What is the patient's perception of their health status since discharge?  Improving   Nursing interventions  Nurse provided patient education   Is the patient/caregiver able to teach back normal signs of recovery?  Nausea and lack of appetite, Depression or irritability, Pain or discomfort at incisional site, Constipation   Is the patient /caregiver able to teach back basic post-op care?  Continue use of incentive spirometry at least 1 week post discharge, Practice 'cough and deep breath', Drive as  instructed by MD in discharge instructions, Take showers only when approved by MD-sponge bathe until then, No tub bath, swimming, or hot tub until instructed by MD, Keep incision areas clean, dry and protected, Do not remove steri-strips, Lifting as instructed by MD in discharge instructions   Is the patient/caregiver able to teach back signs and symptoms of incisional infection?  Increased redness, swelling or pain at the incisonal site, Increased drainage or bleeding, Incisional warmth, Pus or odor from incision, Fever   Is the patient/caregiver able to teach back steps to recovery at home?  Set small, achievable goals for return to baseline health, Rest and rebuild strength, gradually increase activity, Weigh daily, Practice good oral hygiene, Eat a well-balance diet, Make a list of questions for surgeon's appointment   Is the patient /caregiver able to teach back the importance of cardiac rehab?  Yes   Is the patient/caregiver able to teach back the hierarchy of who to call/visit for symptoms/problems? PCP, Specialist, Home health nurse, Urgent Care, ED, 911  Yes   Week 1 call completed?  Yes   Wrap up additional comments  Pt reports doing well. She is having some difficulty sleeping due to being very sore but it is improving.            Jennifer Dupree, RN

## 2020-09-04 ENCOUNTER — HOSPITAL ENCOUNTER (OUTPATIENT)
Dept: GENERAL RADIOLOGY | Facility: HOSPITAL | Age: 55
Discharge: HOME | End: 2020-09-04
Admitting: NURSE PRACTITIONER

## 2020-09-04 DIAGNOSIS — Z48.813 SURGICAL AFTERCARE, RESPIRATORY SYSTEM: ICD-10-CM

## 2020-09-04 PROCEDURE — 71046 X-RAY EXAM CHEST 2 VIEWS: CPT

## 2020-09-08 ENCOUNTER — READMISSION MANAGEMENT (OUTPATIENT)
Dept: CALL CENTER | Facility: HOSPITAL | Age: 55
End: 2020-09-08

## 2020-09-08 NOTE — OUTREACH NOTE
CT Surgery Week 2 Survey      Responses   [facility] patient discharged from?  [place]   Does the patient have one of the following disease processes/diagnoses(primary or secondary)?  Cardiothoracic surgery   Week 2 attempt successful?  Yes   Call start time  1310   Call end time  1315   Week 2 call completed?  Yes   Wrap up additional comments  Pt is doing very well. She is still sore but doing much better.          Jennifer Dupree RN

## 2020-09-16 ENCOUNTER — READMISSION MANAGEMENT (OUTPATIENT)
Dept: CALL CENTER | Facility: HOSPITAL | Age: 55
End: 2020-09-16

## 2020-09-16 NOTE — OUTREACH NOTE
CT Surgery Week 3 Survey      Responses   [facility] [place] patient discharged from?  [place]   Does the patient have one of the following disease processes/diagnoses(primary or secondary)?  Cardiothoracic surgery   Week 3 attempt successful?  No   Unsuccessful attempts  Attempt 1          Raven Vallejo RN

## 2020-09-25 DIAGNOSIS — K59.03 CONSTIPATION DUE TO PAIN MEDICATION: Primary | ICD-10-CM

## 2020-09-25 RX ORDER — LACTULOSE 20 G/30ML
20 SOLUTION ORAL DAILY
Qty: 150 ML | Refills: 0 | Status: SHIPPED | OUTPATIENT
Start: 2020-09-25 | End: 2020-09-30

## 2020-09-28 PROBLEM — E78.2 MIXED HYPERLIPIDEMIA: Status: ACTIVE | Noted: 2020-09-28

## 2020-09-28 PROBLEM — I10 BENIGN ESSENTIAL HTN: Status: ACTIVE | Noted: 2020-09-28

## 2020-09-28 PROBLEM — J43.1 PANLOBULAR EMPHYSEMA (HCC): Status: ACTIVE | Noted: 2020-09-28

## 2020-09-28 NOTE — PROGRESS NOTES
8/10/2020    Alysia Deutsch  1965    Chief Complaint:    Chief Complaint   Patient presents with   • Lung Nodule       HPI:      PCP:  Rosario Mckeon MD  Ortho: Dr. Gaines     55 y.o. female with HTN(stable, increased risk stroke, rupture), Hyperlipidemia(stable, increase risk cardiovascular events), COPD(stable, increase risk pulmonary complications) and Smoker(uncontrolled, increased risk cardiovascular events) insomnia,  pulmonary nodule (new, suspicious malignancy).  smokes 0.25 PPD.  asymptomatic LLL pulmonary nodule.  No TIA stroke amaurosis.  No MI claudication.   No other associated signs, symptoms or modifying factors.     6/2020 CT Brain:  No acute findings.  6/2020 CT Chest: 20mm nodule LEFT lower lobe.  No significant adenopathy.  Liver, adrenals ok.  8/2020 PET CT: LLL nodule 22mm (SUV 5.4)  8/2020 PFT:  FVC 2.9 (136%), FEV1 1.8 (103%), DLCO 54%    The following portions of the patient's history were reviewed and updated as appropriate: allergies, current medications, past family history, past medical history, past social history, past surgical history and problem list.  Recent images independently reviewed.  Available laboratory values reviewed.    PMH:  Past Medical History:   Diagnosis Date   • Back pain    • Hepatitis    • Lung mass    • PONV (postoperative nausea and vomiting)      Past Surgical History:   Procedure Laterality Date   • BREAST AUGMENTATION     • LAPAROSCOPIC TUBAL LIGATION     • THORACOSCOPY Left 8/20/2020    Procedure: LEFT THORACOSCOPY VIDEO ASSISTED THORACOTOMY pulmonary resection thoracic mediastinal lymphadenectomy bronchoscopy;  Surgeon: Horace Christiansen MD;  Location: [facility];  Service: Cardiothoracic;  Laterality: Left;  [place] [phone]  VATS [phone]     Family History   Problem Relation Age of Onset   • Diabetes Other    • Cancer Other    • No Known Problems Mother    • Heart disease Father      Social History     Tobacco Use   • Smoking status:  "Former Smoker     Packs/day: 0.25     Years: 30.00     Pack years: 7.50     Types: Cigarettes   • Smokeless tobacco: Never Used   • Tobacco comment: 8-17-20 (\"DOWN TO ONE PER DAY\"   Substance Use Topics   • Alcohol use: Not Currently   • Drug use: Never       ALLERGIES:  Allergies   Allergen Reactions   • Codeine Hives and Itching   • Adhesive Tape Rash         MEDICATIONS:    Current Outpatient Medications:   •  escitalopram (LEXAPRO) 20 MG tablet, Take 20 mg by mouth Daily., Disp: , Rfl:   •  pravastatin (PRAVACHOL) 40 MG tablet, Take 40 mg by mouth Every Night., Disp: , Rfl:   •  cephalexin (KEFLEX) 500 MG capsule, Take 1 capsule by mouth 3 (Three) Times a Day., Disp: 30 capsule, Rfl: 0  •  cyclobenzaprine (FLEXERIL) 10 MG tablet, Take 1 tablet by mouth 3 (Three) Times a Day As Needed for Muscle Spasms., Disp: 30 tablet, Rfl: 0  •  famotidine (PEPCID) 20 MG tablet, Take 1 tablet by mouth 2 (Two) Times a Day Before Meals., Disp: 60 tablet, Rfl: 0  •  fluticasone (FLONASE) 50 MCG/ACT nasal spray, 1 spray into the nostril(s) as directed by provider Daily., Disp: , Rfl:   •  lactulose 20 GM/30ML solution solution, Take 30 mL by mouth Daily for 5 days., Disp: 150 mL, Rfl: 0  •  nicotine ([employer] NICOTINE TRANSDERMAL SYS) 14 MG/24HR patch, Place 1 patch on the skin as directed by provider Daily., Disp: , Rfl:   •  nicotine polacrilex (NICORETTE) 4 MG gum, Chew 4 mg As Needed for Smoking Cessation., Disp: , Rfl:   •  oxyCODONE-acetaminophen (PERCOCET) 5-325 MG per tablet, Take 2 tablets by mouth Every 4 (Four) Hours As Needed for post-surgical pain., Disp: 36 tablet, Rfl: 0  •  polyethylene glycol (MIRALAX) 17 g pack packet, Dissolve 1 packet (17 g) in 4 to 8 ounces of liquid and drink by mouth Daily., Disp: 7 each, Rfl: 0  •  sennosides-docusate (senna-docusate sodium) 8.6-50 MG per tablet, Take 2 tablets by mouth Every Night., Disp: , Rfl:   •  silver sulfadiazine (SILVADENE, SSD) 1 % cream, Apply topically to the " "appropriate area as directed once daily., Disp: 50 g, Rfl: 0    Review of Systems   Review of Systems   Constitution: Positive for malaise/fatigue. Negative for weight loss.   Cardiovascular: Positive for dyspnea on exertion. Negative for chest pain and claudication.   Respiratory: Negative for cough and shortness of breath.    Skin: Negative for color change and poor wound healing.   Musculoskeletal: Positive for arthritis, joint pain, muscle weakness and neck pain.   Neurological: Negative for dizziness, numbness and weakness.       Physical Exam   Vitals:    08/10/20 1403   BP: 133/76   BP Location: Left arm   Patient Position: Sitting   Pulse: 76   SpO2: 98%   Weight: 57.7 kg (127 lb 3.2 oz)   Height: 160 cm (63\")     Physical Exam  Constitutional:       General: She is not in acute distress.     Appearance: She is not ill-appearing.   HENT:      Right Ear: Hearing normal.      Left Ear: Hearing normal.      Nose: No nasal deformity.      Mouth/Throat:      Dentition: Normal dentition. Does not have dentures.   Cardiovascular:      Rate and Rhythm: Normal rate and regular rhythm.      Pulses:           Carotid pulses are 2+ on the right side and 2+ on the left side.       Radial pulses are 2+ on the right side and 2+ on the left side.        Posterior tibial pulses are 2+ on the right side and 2+ on the left side.      Heart sounds: No murmur.   Pulmonary:      Effort: Pulmonary effort is normal.      Breath sounds: Normal breath sounds.   Abdominal:      General: There is no distension.      Palpations: Abdomen is soft. There is no mass.      Tenderness: There is no abdominal tenderness.   Musculoskeletal:         General: No deformity.      Comments: Gait normal.    Skin:     General: Skin is warm and dry.      Coloration: Skin is not pale.      Findings: No erythema.      Comments: No venous staining   Neurological:      Mental Status: She is alert and oriented to person, place, and time.   Psychiatric:       "   Speech: Speech normal.         Behavior: Behavior is cooperative.         Thought Content: Thought content normal.         Judgment: Judgment normal.         BUN   Date Value Ref Range Status   08/21/2020 6 6 - 20 mg/dL Final     Creatinine   Date Value Ref Range Status   08/21/2020 0.67 0.57 - 1.00 mg/dL Final     eGFR Non  Amer   Date Value Ref Range Status   08/21/2020 91 >60 mL/min/1.73 Final       ASSESSMENT:  Alysia was seen today for lung nodule.    Diagnoses and all orders for this visit:    Panlobular emphysema (CMS/HCC)  -     Pulmonary Function Test; Future    Lung nodule  -     Case Request; Standing  -     Type & Screen; Future  -     Case Request    Benign essential HTN    Mixed hyperlipidemia    Other orders  -     Provide NPO Instructions to Patient; Future  -     Chlorhexidine Skin Prep; Future    PLAN:  Detailed discussion with Alysia Deutsch regarding situation, options and plans. enlarging solitary lung nodule and PET positive lung nodule  Pulmonary resection is advisable.      Risks:  Mortality/Major morbidity 2-3%  including but not limited to infection, bleeding, transfusion, pulmonary dysfunction (prolonged air leak, prolonged mechanical ventilation, need for long term oxygen therapy), renal dysfunction.  Benefits: diagnosis and treatment, improved quality of life, survival.  Options: observation, transthoracic needle biopsy discussed.  Understands and wishes to proceed.      LEFT VAT thoracotomy, pulmonary resection, bronchoscopy, ON-Q pain pump.  GEN.  VLAD.  SDS.  8/20/2020    Return after above studies complete  Recommended regular physical activity, progressive walking program.  Continue current medications as directed.    Thank you for the opportunity to participate in this patient's care.    Copy to primary care provider.    EMR Dragon/Transcription disclaimer:   Much of this encounter note is an electronic transcription/translation of spoken language to printed text. The  electronic translation of spoken language may permit erroneous, or at times, nonsensical words or phrases to be inadvertently transcribed; Although I have reviewed the note for such errors, some may still exist.

## 2020-09-28 NOTE — PROGRESS NOTES
7/24/2020    Alysia Deutsch  1965    Chief Complaint:    Chief Complaint   Patient presents with   • Lung Nodule       HPI:      PCP:  Rosario Mckeon MD  Ortho: Dr. Gaines     55 y.o. female with HTN(stable, increased risk stroke, rupture), Hyperlipidemia(stable, increase risk cardiovascular events), COPD(stable, increase risk pulmonary complications) and Smoker(uncontrolled, increased risk cardiovascular events) insomnia,  pulmonary nodule (new, suspicious malignancy).  smokes 0.25 PPD.  asymptomatic LLL pulmonary nodule.  No TIA stroke amaurosis.  No MI claudication.   No other associated signs, symptoms or modifying factors.    6/2020 CT Brain:  No acute findings.  6/2020 CT Chest: 20mm nodule LEFT lower lobe.  No significant adenopathy.  Liver, adrenals ok.  8/2020 PET CT: LLL nodule 22mm (SUV 5.4)  8/2020 PFT:  FVC 2.9 (136%), FEV1 1.8 (103%), DLCO 54%    The following portions of the patient's history were reviewed and updated as appropriate: allergies, current medications, past family history, past medical history, past social history, past surgical history and problem list.  Recent images independently reviewed.  Available laboratory values reviewed.    PMH:  Past Medical History:   Diagnosis Date   • Back pain    • Hepatitis    • Lung mass    • PONV (postoperative nausea and vomiting)      Past Surgical History:   Procedure Laterality Date   • BREAST AUGMENTATION     • LAPAROSCOPIC TUBAL LIGATION     • THORACOSCOPY Left 8/20/2020    Procedure: LEFT THORACOSCOPY VIDEO ASSISTED THORACOTOMY pulmonary resection thoracic mediastinal lymphadenectomy bronchoscopy;  Surgeon: Horace Christiansen MD;  Location: [facility];  Service: Cardiothoracic;  Laterality: Left;  [place] [phone]  VATS [phone]     Family History   Problem Relation Age of Onset   • Diabetes Other    • Cancer Other    • No Known Problems Mother    • Heart disease Father      Social History     Tobacco Use   • Smoking status:  "Former Smoker     Packs/day: 0.25     Years: 30.00     Pack years: 7.50     Types: Cigarettes   • Smokeless tobacco: Never Used   • Tobacco comment: 8-17-20 (\"DOWN TO ONE PER DAY\"   Substance Use Topics   • Alcohol use: Not Currently   • Drug use: Never       ALLERGIES:  Allergies   Allergen Reactions   • Codeine Hives and Itching   • Adhesive Tape Rash         MEDICATIONS:    Current Outpatient Medications:   •  escitalopram (LEXAPRO) 20 MG tablet, Take 20 mg by mouth Daily., Disp: , Rfl:   •  pravastatin (PRAVACHOL) 40 MG tablet, Take 40 mg by mouth Every Night., Disp: , Rfl:   •  cephalexin (KEFLEX) 500 MG capsule, Take 1 capsule by mouth 3 (Three) Times a Day., Disp: 30 capsule, Rfl: 0  •  cyclobenzaprine (FLEXERIL) 10 MG tablet, Take 1 tablet by mouth 3 (Three) Times a Day As Needed for Muscle Spasms., Disp: 30 tablet, Rfl: 0  •  famotidine (PEPCID) 20 MG tablet, Take 1 tablet by mouth 2 (Two) Times a Day Before Meals., Disp: 60 tablet, Rfl: 0  •  fluticasone (FLONASE) 50 MCG/ACT nasal spray, 1 spray into the nostril(s) as directed by provider Daily., Disp: , Rfl:   •  lactulose 20 GM/30ML solution solution, Take 30 mL by mouth Daily for 5 days., Disp: 150 mL, Rfl: 0  •  nicotine ([employer] NICOTINE TRANSDERMAL SYS) 14 MG/24HR patch, Place 1 patch on the skin as directed by provider Daily., Disp: , Rfl:   •  nicotine polacrilex (NICORETTE) 4 MG gum, Chew 4 mg As Needed for Smoking Cessation., Disp: , Rfl:   •  oxyCODONE-acetaminophen (PERCOCET) 5-325 MG per tablet, Take 2 tablets by mouth Every 4 (Four) Hours As Needed for post-surgical pain., Disp: 36 tablet, Rfl: 0  •  polyethylene glycol (MIRALAX) 17 g pack packet, Dissolve 1 packet (17 g) in 4 to 8 ounces of liquid and drink by mouth Daily., Disp: 7 each, Rfl: 0  •  sennosides-docusate (senna-docusate sodium) 8.6-50 MG per tablet, Take 2 tablets by mouth Every Night., Disp: , Rfl:   •  silver sulfadiazine (SILVADENE, SSD) 1 % cream, Apply topically to the " "appropriate area as directed once daily., Disp: 50 g, Rfl: 0    Review of Systems   Review of Systems   Constitution: Positive for malaise/fatigue. Negative for night sweats and weight loss.   HENT: Negative for hearing loss, hoarse voice and stridor.    Eyes: Negative for vision loss in left eye, vision loss in right eye and visual disturbance.   Cardiovascular: Positive for dyspnea on exertion. Negative for chest pain, leg swelling and palpitations.   Respiratory: Negative for cough, hemoptysis and shortness of breath.    Hematologic/Lymphatic: Negative for adenopathy and bleeding problem. Does not bruise/bleed easily.   Skin: Negative for color change, poor wound healing and rash.   Musculoskeletal: Positive for arthritis, joint pain, muscle weakness and neck pain. Negative for back pain.   Gastrointestinal: Negative for abdominal pain, dysphagia and heartburn.   Neurological: Negative for dizziness, numbness and seizures.   Psychiatric/Behavioral: Negative for altered mental status, depression and memory loss. The patient is not nervous/anxious.        Physical Exam   Vitals:    07/24/20 1300 07/24/20 1303   BP: 116/70 120/70   BP Location: Left arm Right arm   Patient Position: Sitting Sitting   Cuff Size: Adult Adult   Pulse: 83    Temp: 98.2 °F (36.8 °C)    TempSrc: Temporal    SpO2: 98%    Weight: 58.5 kg (129 lb)    Height: 160 cm (63\")      Physical Exam  Constitutional:       General: She is not in acute distress.     Appearance: She is not ill-appearing.   HENT:      Head: Atraumatic.      Right Ear: Hearing normal.      Left Ear: Hearing normal.      Nose: No nasal deformity.      Mouth/Throat:      Dentition: Normal dentition. Does not have dentures.   Eyes:      General: Lids are normal.      Conjunctiva/sclera: Conjunctivae normal.      Pupils:      Right eye: Pupil is round and reactive.      Left eye: Pupil is round and reactive.   Neck:      Thyroid: No thyroid mass or thyromegaly.      Vascular: " No carotid bruit or JVD.      Trachea: No tracheal deviation.   Cardiovascular:      Rate and Rhythm: Normal rate and regular rhythm.      Pulses:           Carotid pulses are 2+ on the right side and 2+ on the left side.       Radial pulses are 2+ on the right side and 2+ on the left side.        Dorsalis pedis pulses are 2+ on the right side and 2+ on the left side.        Posterior tibial pulses are 2+ on the right side and 2+ on the left side.      Heart sounds: No murmur.   Pulmonary:      Effort: Pulmonary effort is normal.      Breath sounds: Normal breath sounds.   Abdominal:      General: There is no distension.      Palpations: Abdomen is soft. There is no hepatomegaly, splenomegaly or mass.      Tenderness: There is no abdominal tenderness.   Musculoskeletal:         General: No deformity.      Comments: Gait normal.    Lymphadenopathy:      Cervical: No cervical adenopathy.      Upper Body:      Right upper body: No supraclavicular adenopathy.      Left upper body: No supraclavicular adenopathy.   Skin:     General: Skin is warm and dry.      Coloration: Skin is not pale.      Findings: No erythema.      Comments: No venous staining   Neurological:      Mental Status: She is alert and oriented to person, place, and time.   Psychiatric:         Speech: Speech normal.         Behavior: Behavior is cooperative.         Thought Content: Thought content normal.         Judgment: Judgment normal.         BUN   Date Value Ref Range Status   08/21/2020 6 6 - 20 mg/dL Final     Creatinine   Date Value Ref Range Status   08/21/2020 0.67 0.57 - 1.00 mg/dL Final     eGFR Non  Amer   Date Value Ref Range Status   08/21/2020 91 >60 mL/min/1.73 Final       ASSESSMENT:  Alysia was seen today for lung nodule.    Diagnoses and all orders for this visit:    Solitary pulmonary nodule  -     Pulmonary Function Test; Future  -     NM Pet Skull Base To Mid Thigh; Future    Panlobular emphysema (CMS/HCC)  -     Pulmonary  Function Test; Future  -     NM Pet Skull Base To Mid Thigh; Future    Mixed hyperlipidemia    Benign essential HTN      PLAN:  Detailed discussion with Alysia Deutsch regarding situation and options.  New lung nodule found 6/2020 in [place].  Additional imaging required to evaluate pulmonary nodule.  Multiple risk factors with severe comorbidities.  No intervention indicated at this time.  Will follow with interval imaging.  Risks, benefits discussed.  Understands and wishes to proceed with plan.    PET CT  PFT    Return after above studies complete  Recommended regular physical activity, progressive walking program.  Continue current medications as directed.  Will Obtain relevant old records.    Thank you for the opportunity to participate in this patient's care.    Copy to primary care provider.    EMR Dragon/Transcription disclaimer:   Much of this encounter note is an electronic transcription/translation of spoken language to printed text. The electronic translation of spoken language may permit erroneous, or at times, nonsensical words or phrases to be inadvertently transcribed; Although I have reviewed the note for such errors, some may still exist.

## 2020-10-02 ENCOUNTER — OFFICE VISIT (OUTPATIENT)
Dept: CARDIAC SURGERY | Facility: CLINIC | Age: 55
End: 2020-10-02

## 2020-10-02 VITALS
WEIGHT: 129 LBS | SYSTOLIC BLOOD PRESSURE: 119 MMHG | HEART RATE: 66 BPM | DIASTOLIC BLOOD PRESSURE: 78 MMHG | OXYGEN SATURATION: 99 % | BODY MASS INDEX: 22.86 KG/M2 | HEIGHT: 63 IN

## 2020-10-02 DIAGNOSIS — C34.92 ADENOCARCINOMA OF LEFT LUNG (HCC): ICD-10-CM

## 2020-10-02 DIAGNOSIS — R91.1 SOLITARY PULMONARY NODULE: Primary | ICD-10-CM

## 2020-10-02 DIAGNOSIS — Z48.813 SURGICAL AFTERCARE, RESPIRATORY SYSTEM: ICD-10-CM

## 2020-10-02 DIAGNOSIS — K59.03 CONSTIPATION DUE TO PAIN MEDICATION: ICD-10-CM

## 2020-10-02 PROCEDURE — 99024 POSTOP FOLLOW-UP VISIT: CPT | Performed by: NURSE PRACTITIONER

## 2020-10-02 RX ORDER — LACTULOSE 20 G/30ML
20 SOLUTION ORAL DAILY PRN
Qty: 150 ML | Refills: 0 | Status: SHIPPED | OUTPATIENT
Start: 2020-10-02 | End: 2020-11-03

## 2020-10-02 NOTE — PATIENT INSTRUCTIONS
[address]    Continue lifting restrictions for 4 more weeks, referral to GI for constipation.      Return to work in 4 weeks.      Return approximately 5-6 months for CT of chest for surveillance of reoccurrence.

## 2020-10-05 NOTE — PROGRESS NOTES
CVTS Office Progress Note     10/5/2020    Alysia Deutsch  1965    Chief Complaint:    Chief Complaint   Patient presents with   • Lung Nodule       HPI:       PCP:  Rosario Mckeon MD  Ortho: Dr. Gaines     55 y.o. female with HTN(stable, increased risk stroke, rupture) and Smoker(uncontrolled, increased risk cardiovascular events) insomnia, isolated pulmonary nodule (new, increased risk malignancy).  smokes 0.25 PPD.  Established with [facility] for LLL pulmonary nodule, PET/CT positive.  Underwent PFT, preoperative evaluation, surgical resection was advisable.  Underwent Left lower lobectomy, 8/2020, surgical margins, lymph nodes negative.  Returns today in post surgical follow up.  Pain controlled, some muscle spasms at bedtime.  Dealing with constipation since surgery, poor response to laxatives.  No other associated signs, symptoms or modifying factors.    8/2020 CT/PET: LLL nodule 2.4x1.6 SUB 5.43  8/2020 L VATS Left lower lobectomy  8/2020 Surgical Pathology: Adenocarcinoma, lymph nodes and surgical margins negative for neoplasia.  PT1B reviewed by Dr. Christiansen    The following portions of the patient's history were reviewed and updated as appropriate: allergies, current medications, past family history, past medical history, past social history, past surgical history and problem list.  Recent images independently reviewed.  Available laboratory values reviewed.    PMH:  Past Medical History:   Diagnosis Date   • Back pain    • Hepatitis    • Lung mass    • PONV (postoperative nausea and vomiting)      Past Surgical History:   Procedure Laterality Date   • BREAST AUGMENTATION     • LAPAROSCOPIC TUBAL LIGATION     • THORACOSCOPY Left 8/20/2020    Procedure: LEFT THORACOSCOPY VIDEO ASSISTED THORACOTOMY pulmonary resection thoracic mediastinal lymphadenectomy bronchoscopy;  Surgeon: Horace Christiansen MD;  Location: [facility];  Service: Cardiothoracic;  Laterality: Left;  [place]  "[id number]  [facility] [id number]     Family History   Problem Relation Age of Onset   • Diabetes Other    • Cancer Other    • No Known Problems Mother    • Heart disease Father      Social History     Tobacco Use   • Smoking status: Former Smoker     Packs/day: 0.25     Years: 30.00     Pack years: 7.50     Types: Cigarettes   • Smokeless tobacco: Never Used   • Tobacco comment: 8-17-20 (\"DOWN TO ONE PER DAY\"   Substance Use Topics   • Alcohol use: Not Currently   • Drug use: Never       ALLERGIES:  Allergies   Allergen Reactions   • Codeine Hives and Itching   • Adhesive Tape Rash         MEDICATIONS:    Current Outpatient Medications:   •  cyclobenzaprine (FLEXERIL) 10 MG tablet, Take 1 tablet by mouth 3 (Three) Times a Day As Needed for Muscle Spasms., Disp: 30 tablet, Rfl: 0  •  escitalopram (LEXAPRO) 20 MG tablet, Take 20 mg by mouth Daily., Disp: , Rfl:   •  famotidine (PEPCID) 20 MG tablet, Take 1 tablet by mouth 2 (Two) Times a Day Before Meals., Disp: 60 tablet, Rfl: 0  •  fluticasone (FLONASE) 50 MCG/ACT nasal spray, 1 spray into the nostril(s) as directed by provider Daily., Disp: , Rfl:   •  nicotine ([employer] NICOTINE TRANSDERMAL SYS) 14 MG/24HR patch, Place 1 patch on the skin as directed by provider Daily., Disp: , Rfl:   •  nicotine polacrilex (NICORETTE) 4 MG gum, Chew 4 mg As Needed for Smoking Cessation., Disp: , Rfl:   •  polyethylene glycol (MIRALAX) 17 g pack packet, Dissolve 1 packet (17 g) in 4 to 8 ounces of liquid and drink by mouth Daily., Disp: 7 each, Rfl: 0  •  pravastatin (PRAVACHOL) 40 MG tablet, Take 40 mg by mouth Every Night., Disp: , Rfl:   •  sennosides-docusate (senna-docusate sodium) 8.6-50 MG per tablet, Take 2 tablets by mouth Every Night., Disp: , Rfl:   •  silver sulfadiazine (SILVADENE, SSD) 1 % cream, Apply topically to the appropriate area as directed once daily., Disp: 50 g, Rfl: 0  •  cephalexin (KEFLEX) 500 MG capsule, Take 1 capsule by mouth 3 (Three) Times a Day., Disp: 30 " "capsule, Rfl: 0  •  lactulose 20 GM/30ML solution solution, Take 30 mL by mouth Daily As Needed (take as needed for consiptation)., Disp: 150 mL, Rfl: 0      Review of Systems   Constitution: Negative for chills, decreased appetite, fever and weight loss.   HENT: Negative for congestion, nosebleeds and sore throat.    Eyes: Negative for blurred vision, visual disturbance and visual halos.   Cardiovascular: Positive for dyspnea on exertion. Negative for chest pain and leg swelling.   Respiratory: Negative for cough, shortness of breath, sputum production and wheezing.    Endocrine: Negative for cold intolerance and polyuria.   Hematologic/Lymphatic: Negative for bleeding problem. Does not bruise/bleed easily.   Skin: Positive for unusual hair distribution. Negative for flushing and nail changes.   Musculoskeletal: Positive for back pain. Negative for arthritis and joint pain.   Gastrointestinal: Negative for bloating, abdominal pain, hematemesis, melena, nausea and vomiting.   Genitourinary: Negative for flank pain and hematuria.   Neurological: Negative for brief paralysis, difficulty with concentration, focal weakness, light-headedness, loss of balance, numbness, paresthesias and weakness.        No amaurosis fugax, TIA, or CVA.     Psychiatric/Behavioral: Negative for altered mental status, depression, substance abuse and suicidal ideas.   Allergic/Immunologic: Negative for hives and persistent infections.         Vitals:    10/02/20 1043   BP: 119/78   BP Location: Left arm   Patient Position: Sitting   Pulse: 66   SpO2: 99%   Weight: 58.5 kg (129 lb)   Height: 160 cm (63\")     Physical Exam   Constitutional: She is oriented to person, place, and time. She appears well-developed.   HENT:   Head: Normocephalic and atraumatic.   Eyes: Pupils are equal, round, and reactive to light. Conjunctivae are normal.   Neck: Normal range of motion. Neck supple.   Cardiovascular: Normal rate.   Pulmonary/Chest: Effort normal " and breath sounds normal. No respiratory distress.   Abdominal: Soft. Bowel sounds are normal. She exhibits no distension.   Musculoskeletal: Normal range of motion.   Neurological: She is alert and oriented to person, place, and time. No cranial nerve deficit.   Skin: Skin is warm and dry. Capillary refill takes 2 to 3 seconds.   Left thoracotomy incision CDI  CT sutures removed   Psychiatric: Judgment normal.   Nursing note and vitals reviewed.      Assessment & Plan     Independent Review of Studies    1. Lung nodule    2. Adenocarcinoma of left lung (CMS/HCC)  Stage 1 NSCLC.  Status post surgical resection.  Path reviewed by Dr. Christiansen and discussed  Repeat CT of chest without contrast in 6 months, follow up with Dr. Christiansen to review.     3. Surgical aftercare, respiratory system  Lifting restrictions 10lbs left arm x 4weeks    May return to work in 4 weeks.    - XR Chest 2 View; Future    4. Constipation due to pain medication  Intermittent use of laxatives, failed miralax, lactulose.   Non-urgent referral to GI  Current not taking pain medication    - Ambulatory Referral to Gastroenterology        Detailed discussion regarding risks, benefits, and treatment plan. Images independently reviewed. Patient understands, agrees, and wishes to proceed with plan.       This document has been electronically signed by KRISTEN Zafar-BC @  On October 5, 2020 09:27 CDT      EMR Dragon/Transcription disclaimer:   Much of this encounter note is an electronic transcription/translation of spoken language to printed text. The electronic translation of spoken language may permit erroneous, or at times, nonsensical words or phrases to be inadvertently transcribed; Although I have reviewed the note for such errors, some may still exist.

## 2020-10-13 ENCOUNTER — OFFICE VISIT (OUTPATIENT)
Dept: GASTROENTEROLOGY | Facility: CLINIC | Age: 55
End: 2020-10-13

## 2020-10-13 VITALS
BODY MASS INDEX: 22.64 KG/M2 | HEIGHT: 63 IN | DIASTOLIC BLOOD PRESSURE: 70 MMHG | WEIGHT: 127.8 LBS | HEART RATE: 75 BPM | SYSTOLIC BLOOD PRESSURE: 102 MMHG | OXYGEN SATURATION: 96 %

## 2020-10-13 DIAGNOSIS — K21.9 GASTROESOPHAGEAL REFLUX DISEASE, UNSPECIFIED WHETHER ESOPHAGITIS PRESENT: ICD-10-CM

## 2020-10-13 DIAGNOSIS — R10.84 GENERALIZED ABDOMINAL PAIN: Primary | ICD-10-CM

## 2020-10-13 DIAGNOSIS — K59.09 OTHER CONSTIPATION: ICD-10-CM

## 2020-10-13 PROCEDURE — 99204 OFFICE O/P NEW MOD 45 MIN: CPT | Performed by: INTERNAL MEDICINE

## 2020-10-13 RX ORDER — SODIUM, POTASSIUM,MAG SULFATES 17.5-3.13G
1 SOLUTION, RECONSTITUTED, ORAL ORAL EVERY 12 HOURS
Qty: 1 BOTTLE | Refills: 0 | Status: ON HOLD | OUTPATIENT
Start: 2020-10-13 | End: 2020-10-27

## 2020-10-13 RX ORDER — DEXTROSE AND SODIUM CHLORIDE 5; .45 G/100ML; G/100ML
30 INJECTION, SOLUTION INTRAVENOUS CONTINUOUS PRN
Status: CANCELLED | OUTPATIENT
Start: 2020-10-27

## 2020-10-13 RX ORDER — DEXLANSOPRAZOLE 60 MG/1
60 CAPSULE, DELAYED RELEASE ORAL DAILY
Qty: 30 CAPSULE | Refills: 5 | Status: SHIPPED | OUTPATIENT
Start: 2020-10-13 | End: 2021-02-03

## 2020-10-13 NOTE — PATIENT INSTRUCTIONS
"BMI for Adults  What is BMI?  Body mass index (BMI) is a number that is calculated from a person's weight and height. BMI can help estimate how much of a person's weight is composed of fat. BMI does not measure body fat directly. Rather, it is an alternative to procedures that directly measure body fat, which can be difficult and expensive.  BMI can help identify people who may be at higher risk for certain medical problems.  What are BMI measurements used for?  BMI is used as a screening tool to identify possible weight problems. It helps determine whether a person is obese, overweight, a healthy weight, or underweight.  BMI is useful for:  · Identifying a weight problem that may be related to a medical condition or may increase the risk for medical problems.  · Promoting changes, such as changes in diet and exercise, to help reach a healthy weight. BMI screening can be repeated to see if these changes are working.  How is BMI calculated?  BMI involves measuring your weight in relation to your height. Both height and weight are measured, and the BMI is calculated from those numbers. This can be done either in English (U[place] or metric measurements. Note that charts and online BMI calculators are available to help you find your BMI quickly and easily without having to do these calculations yourself.  To calculate your BMI in English ([place].S[place] measurements:    1. Measure your weight in pounds (lb).  2. Multiply the number of pounds by 703.  ? For example, for a person who weighs 180 lb, multiply that number by 703, which equals 126,540.  3. Measure your height in inches. Then multiply that number by itself to get a measurement called \"inches squared.\"  ? For example, for a person who is 70 inches tall, the \"inches squared\" measurement is 70 inches x 70 inches, which equals 4,900 inches squared.  4. Divide the total from step 2 (number of lb x 703) by the total from step 3 (inches squared): 126,540 ÷ 4,900 = 25.8. This is " "your BMI.  To calculate your BMI in metric measurements:  1. Measure your weight in kilograms (kg).  2. Measure your height in meters (m). Then multiply that number by itself to get a measurement called \"meters squared.\"  ? For example, for a person who is 1.75 m tall, the \"meters squared\" measurement is 1.75 m x 1.75 m, which is equal to 3.1 meters squared.  3. Divide the number of kilograms (your weight) by the meters squared number. In this example: 70 ÷ 3.1 = 22.6. This is your BMI.  What do the results mean?  BMI charts are used to identify whether you are underweight, normal weight, overweight, or obese. The following guidelines will be used:  · Underweight: BMI less than 18.5.  · Normal weight: BMI between 18.5 and 24.9.  · Overweight: BMI between 25 and 29.9.  · Obese: BMI of 30 or above.  Keep these notes in mind:  · Weight includes both fat and muscle, so someone with a muscular build, such as an athlete, may have a BMI that is higher than 24.9. In cases like these, BMI is not an accurate measure of body fat.  · To determine if excess body fat is the cause of a BMI of 25 or higher, further assessments may need to be done by a health care provider.  · BMI is usually interpreted in the same way for men and women.  Where to find more information  For more information about BMI, including tools to quickly calculate your BMI, go to these websites:  · Centers for Disease Control and Prevention: www.cdc.gov  · American Heart Association: www.heart.org  · National Heart, Lung, and Blood [place]: www.nhlbi.nih.gov  Summary  · Body mass index (BMI) is a number that is calculated from a person's weight and height.  · BMI may help estimate how much of a person's weight is composed of fat. BMI can help identify those who may be at higher risk for certain medical problems.  · BMI can be measured using English measurements or metric measurements.  · BMI charts are used to identify whether you are underweight, normal " weight, overweight, or obese.  This information is not intended to replace advice given to you by your health care provider. Make sure you discuss any questions you have with your health care provider.  Document Released: 08/29/2005 Document Revised: 09/09/2020 Document Reviewed: 07/17/2020  Elsevier Patient Education © 2020 [employer]

## 2020-10-13 NOTE — PROGRESS NOTES
[facility]      Chief Complaint:   Chief Complaint   Patient presents with   • Constipation       Subjective     HPI:   Ms. Deutsch is a 55-year-old  female with past medical history of adenocarcinoma of the left lung status post segmental pneumonectomy, longstanding GERD of several years duration, tobacco abuse presenting for evaluation for worsening constipation.  She has intermittent constipation for past several years associated with bloating and abdominal pain which has gotten worse since she had a pneumonectomy.  She has bowel movements once a week and has generalized abdominal pain and bloating with obstipation.  She is taking fiber supplements, stool softeners and MiraLAX without much help.  Also has GERD for past several years for which she takes Pepcid and Tums on daily basis.  Has intermittent bouts of chest pain for past few months.  Had dysphagia in the past with solid food which has improved since pneumonectomy.  Denied diarrhea, rectal bleeding or weight loss.    Past Medical History:   Past Medical History:   Diagnosis Date   • Back pain    • Hepatitis    • Lung mass    • PONV (postoperative nausea and vomiting)        Past Surgical History:  Past Surgical History:   Procedure Laterality Date   • BREAST AUGMENTATION     • LAPAROSCOPIC TUBAL LIGATION     • THORACOSCOPY Left 8/20/2020    Procedure: LEFT THORACOSCOPY VIDEO ASSISTED THORACOTOMY pulmonary resection thoracic mediastinal lymphadenectomy bronchoscopy;  Surgeon: Horace Christiansen MD;  Location: [facility];  Service: Cardiothoracic;  Laterality: Left;  [place] [id number]  VATS [id number]       Family History:  Family History   Problem Relation Age of Onset   • Diabetes Other    • Cancer Other    • No Known Problems Mother    • Heart disease Father    • Pancreatic cancer Father    • Colon cancer Maternal Grandfather        Social History:   reports that she has been smoking cigarettes. She has a 7.50 pack-year  smoking history. She has never used smokeless tobacco. She reports previous alcohol use. She reports that she does not use drugs.    Medications:   Prior to Admission medications    Medication Sig Start Date End Date Taking? Authorizing Provider   cephalexin (KEFLEX) 500 MG capsule Take 1 capsule by mouth 3 (Three) Times a Day. 8/24/20  Yes Ruben Hammer APRN   cyclobenzaprine (FLEXERIL) 10 MG tablet Take 1 tablet by mouth 3 (Three) Times a Day As Needed for Muscle Spasms. 8/31/20  Yes Ruben Hammer APRN   escitalopram (LEXAPRO) 20 MG tablet Take 20 mg by mouth Daily. 6/23/20  Yes Debbie Menendez MD   fluticasone (FLONASE) 50 MCG/ACT nasal spray 1 spray into the nostril(s) as directed by provider Daily.   Yes Debbie Menendez MD   lactulose 20 GM/30ML solution solution Take 30 mL by mouth Daily As Needed (take as needed for consiptation). 10/2/20  Yes Ruben Hammer APRN   nicotine ([employer] NICOTINE TRANSDERMAL SYS) 14 MG/24HR patch Place 1 patch on the skin as directed by provider Daily.   Yes Debbie Menendez MD   nicotine polacrilex (NICORETTE) 4 MG gum Chew 4 mg As Needed for Smoking Cessation.   Yes Debbie Menendez MD   polyethylene glycol (MIRALAX) 17 g pack packet Dissolve 1 packet (17 g) in 4 to 8 ounces of liquid and drink by mouth Daily. 8/25/20  Yes Ruben Hammer APRN   pravastatin (PRAVACHOL) 40 MG tablet Take 40 mg by mouth Every Night.   Yes Debbie Menendez MD   sennosides-docusate (senna-docusate sodium) 8.6-50 MG per tablet Take 2 tablets by mouth Every Night. 8/24/20  Yes Ruben Hammer APRN   famotidine (PEPCID) 20 MG tablet Take 1 tablet by mouth 2 (Two) Times a Day Before Meals. 8/24/20 10/13/20 Yes Ruben Hammer APRN   dexlansoprazole (Dexilant) 60 MG capsule Take 1 capsule by mouth Daily. 10/13/20   Carlos Enrique Alcantara MD   linaclotide (LINZESS) 290 MCG capsule capsule Take 1 capsule by mouth Every Morning Before Breakfast. 10/13/20   [place]  "MD Carlos Enrique   silver sulfadiazine (SILVADENE, SSD) 1 % cream Apply topically to the appropriate area as directed once daily. 8/25/20   Ruben Hammer APRN   sodium-potassium-magnesium sulfates (Suprep Bowel Prep Kit) 17.5-3.13-1.6 GM/177ML solution oral solution Take 1 bottle by mouth Every 12 (Twelve) Hours. 10/13/20   Carlos Enrique Alcantara MD       Allergies:  Codeine and Adhesive tape    ROS:    Review of Systems   Constitutional: Negative for chills, fatigue, fever and unexpected weight change.   HENT: Positive for trouble swallowing. Negative for congestion, ear discharge, hearing loss, nosebleeds and sore throat.    Eyes: Negative for pain, discharge and redness.   Respiratory: Negative for cough, chest tightness, shortness of breath and wheezing.    Cardiovascular: Negative for chest pain and palpitations.   Gastrointestinal: Positive for abdominal pain and constipation. Negative for abdominal distention, blood in stool, diarrhea, nausea and vomiting.   Endocrine: Negative for cold intolerance, polydipsia, polyphagia and polyuria.   Genitourinary: Negative for dysuria, flank pain, frequency, hematuria and urgency.   Musculoskeletal: Negative for arthralgias, back pain, joint swelling and myalgias.   Skin: Negative for color change, pallor and rash.   Neurological: Negative for tremors, seizures, syncope, weakness and headaches.   Hematological: Negative for adenopathy. Does not bruise/bleed easily.   Psychiatric/Behavioral: Negative for behavioral problems, confusion, dysphoric mood, hallucinations and suicidal ideas. The patient is not nervous/anxious.    Has bloating and GERD.  Objective     Blood pressure 102/70, pulse 75, height 160 cm (63\"), weight 58 kg (127 lb 12.8 oz), SpO2 96 %.    Physical Exam  Constitutional:       Appearance: She is well-developed.   HENT:      Head: Normocephalic and atraumatic.   Eyes:      Conjunctiva/sclera: Conjunctivae normal.      Pupils: Pupils are equal, round, and " reactive to light.   Neck:      Musculoskeletal: Normal range of motion and neck supple.      Thyroid: No thyromegaly.   Cardiovascular:      Rate and Rhythm: Normal rate and regular rhythm.      Heart sounds: Normal heart sounds. No murmur.   Pulmonary:      Effort: Pulmonary effort is normal.      Breath sounds: Normal breath sounds. No wheezing.   Abdominal:      General: Bowel sounds are normal. There is no distension.      Palpations: Abdomen is soft. There is no mass.      Tenderness: There is no abdominal tenderness.      Hernia: No hernia is present.   Genitourinary:     Comments: No lesions noted  Musculoskeletal: Normal range of motion.         General: No tenderness.   Lymphadenopathy:      Cervical: No cervical adenopathy.   Skin:     General: Skin is warm and dry.      Findings: No rash.   Neurological:      Mental Status: She is alert and oriented to person, place, and time.      Cranial Nerves: No cranial nerve deficit.   Psychiatric:         Thought Content: Thought content normal.        Extremities: No edema, cyanosis or clubbing.    Assessment/Plan    1.  Abdominal pain with bloating and constipation, likely due to irritable bowel syndrome and constipation.  Continue MiraLAX and high-fiber diet.  Increase p.o. water intake.  Add Linzess 290 mcg p.o. daily.  Schedule colonoscopy for colorectal cancer screening.  2.  Longstanding GERD, with continued symptoms on Pepcid and Tums.  Change Pepcid to Dexilant 60 mg 1 p.o. daily.  Antireflux lifestyle.  Proceed with EGD for Orr's screening.  3.  Dysphagia, improved.  Add Dexilant.  EGD for further evaluation.  4.  tobacco usage, recommend cessation.  5.  Chest pain, likely due to GERD.  Continue PPI and add antireflux lifestyle.  Diagnoses and all orders for this visit:    1. Generalized abdominal pain (Primary)  -     Case Request; Standing  -     dextrose 5 % and sodium chloride 0.45 % infusion  -     Case Request    2. Other constipation  -      Case Request; Standing  -     dextrose 5 % and sodium chloride 0.45 % infusion  -     Case Request    3. Gastroesophageal reflux disease, unspecified whether esophagitis present  -     Case Request; Standing  -     dextrose 5 % and sodium chloride 0.45 % infusion  -     Case Request    Other orders  -     Follow Anesthesia Guidelines / Standing Orders; Future  -     Obtain Informed Consent; Future  -     Implement Anesthesia Orders Day of Procedure; Standing  -     Obtain Informed Consent; Standing  -     POC Glucose Once; Standing  -     linaclotide (LINZESS) 290 MCG capsule capsule; Take 1 capsule by mouth Every Morning Before Breakfast.  Dispense: 30 capsule; Refill: 5  -     dexlansoprazole (Dexilant) 60 MG capsule; Take 1 capsule by mouth Daily.  Dispense: 30 capsule; Refill: 5  -     sodium-potassium-magnesium sulfates (Suprep Bowel Prep Kit) 17.5-3.13-1.6 GM/177ML solution oral solution; Take 1 bottle by mouth Every 12 (Twelve) Hours.  Dispense: 1 bottle; Refill: 0        ESOPHAGOGASTRODUODENOSCOPY (N/A), COLONOSCOPY (N/A)     Diagnosis Plan   1. Generalized abdominal pain  Case Request    dextrose 5 % and sodium chloride 0.45 % infusion    Case Request   2. Other constipation  Case Request    dextrose 5 % and sodium chloride 0.45 % infusion    Case Request   3. Gastroesophageal reflux disease, unspecified whether esophagitis present  Case Request    dextrose 5 % and sodium chloride 0.45 % infusion    Case Request       Anticipated Surgical Procedure:  Orders Placed This Encounter   Procedures   • Follow Anesthesia Guidelines / Standing Orders     Standing Status:   Future   • Obtain Informed Consent     Standing Status:   Future     Order Specific Question:   Informed Consent Given For     Answer:   egd and colonoscopy       The risks, benefits, and alternatives of this procedure have been discussed with the patient or the responsible party- the patient understands and agrees to proceed.            This  document has been electronically signed by Carlos Enrique Alcantara MD on October 13, 2020 11:09 CDT

## 2020-10-24 ENCOUNTER — LAB (OUTPATIENT)
Dept: LAB | Facility: HOSPITAL | Age: 55
End: 2020-10-24

## 2020-10-24 DIAGNOSIS — Z01.818 PREOP TESTING: Primary | ICD-10-CM

## 2020-10-24 PROCEDURE — U0003 INFECTIOUS AGENT DETECTION BY NUCLEIC ACID (DNA OR RNA); SEVERE ACUTE RESPIRATORY SYNDROME CORONAVIRUS 2 (SARS-COV-2) (CORONAVIRUS DISEASE [COVID-19]), AMPLIFIED PROBE TECHNIQUE, MAKING USE OF HIGH THROUGHPUT TECHNOLOGIES AS DESCRIBED BY CMS-2020-01-R: HCPCS

## 2020-10-24 PROCEDURE — C9803 HOPD COVID-19 SPEC COLLECT: HCPCS

## 2020-10-25 LAB
COVID LABCORP PRIORITY: NORMAL
SARS-COV-2 RNA RESP QL NAA+PROBE: NOT DETECTED

## 2020-10-27 ENCOUNTER — ANESTHESIA EVENT (OUTPATIENT)
Dept: GASTROENTEROLOGY | Facility: HOSPITAL | Age: 55
End: 2020-10-27

## 2020-10-27 ENCOUNTER — ANESTHESIA (OUTPATIENT)
Dept: GASTROENTEROLOGY | Facility: HOSPITAL | Age: 55
End: 2020-10-27

## 2020-10-27 ENCOUNTER — HOSPITAL ENCOUNTER (OUTPATIENT)
Facility: HOSPITAL | Age: 55
Setting detail: HOSPITAL OUTPATIENT SURGERY
Discharge: HOME | End: 2020-10-27
Attending: INTERNAL MEDICINE | Admitting: INTERNAL MEDICINE

## 2020-10-27 VITALS
RESPIRATION RATE: 18 BRPM | HEART RATE: 64 BPM | SYSTOLIC BLOOD PRESSURE: 103 MMHG | BODY MASS INDEX: 22.61 KG/M2 | TEMPERATURE: 97.6 F | DIASTOLIC BLOOD PRESSURE: 54 MMHG | WEIGHT: 127.6 LBS | HEIGHT: 63 IN | OXYGEN SATURATION: 98 %

## 2020-10-27 DIAGNOSIS — K59.09 OTHER CONSTIPATION: ICD-10-CM

## 2020-10-27 DIAGNOSIS — K21.9 GASTROESOPHAGEAL REFLUX DISEASE, UNSPECIFIED WHETHER ESOPHAGITIS PRESENT: ICD-10-CM

## 2020-10-27 DIAGNOSIS — R10.84 GENERALIZED ABDOMINAL PAIN: ICD-10-CM

## 2020-10-27 PROCEDURE — 43239 EGD BIOPSY SINGLE/MULTIPLE: CPT | Performed by: INTERNAL MEDICINE

## 2020-10-27 PROCEDURE — 45380 COLONOSCOPY AND BIOPSY: CPT | Performed by: INTERNAL MEDICINE

## 2020-10-27 PROCEDURE — 25010000002 PROPOFOL 10 MG/ML EMULSION: Performed by: NURSE ANESTHETIST, CERTIFIED REGISTERED

## 2020-10-27 RX ORDER — LIDOCAINE HYDROCHLORIDE 20 MG/ML
INJECTION, SOLUTION INTRAVENOUS AS NEEDED
Status: DISCONTINUED | OUTPATIENT
Start: 2020-10-27 | End: 2020-10-27 | Stop reason: SURG

## 2020-10-27 RX ORDER — PROPOFOL 10 MG/ML
VIAL (ML) INTRAVENOUS AS NEEDED
Status: DISCONTINUED | OUTPATIENT
Start: 2020-10-27 | End: 2020-10-27 | Stop reason: SURG

## 2020-10-27 RX ORDER — DEXTROSE AND SODIUM CHLORIDE 5; .45 G/100ML; G/100ML
30 INJECTION, SOLUTION INTRAVENOUS CONTINUOUS PRN
Status: DISCONTINUED | OUTPATIENT
Start: 2020-10-27 | End: 2020-10-27 | Stop reason: HOSPADM

## 2020-10-27 RX ADMIN — PROPOFOL 80 MG: 10 INJECTION, EMULSION INTRAVENOUS at 13:41

## 2020-10-27 RX ADMIN — LIDOCAINE HYDROCHLORIDE 50 MG: 20 INJECTION, SOLUTION INTRAVENOUS at 13:41

## 2020-10-27 RX ADMIN — PROPOFOL 40 MG: 10 INJECTION, EMULSION INTRAVENOUS at 13:45

## 2020-10-27 RX ADMIN — PROPOFOL 20 MG: 10 INJECTION, EMULSION INTRAVENOUS at 13:51

## 2020-10-27 RX ADMIN — PROPOFOL 20 MG: 10 INJECTION, EMULSION INTRAVENOUS at 13:48

## 2020-10-27 RX ADMIN — DEXTROSE AND SODIUM CHLORIDE 30 ML/HR: 5; 450 INJECTION, SOLUTION INTRAVENOUS at 13:01

## 2020-10-27 RX ADMIN — PROPOFOL 20 MG: 10 INJECTION, EMULSION INTRAVENOUS at 13:54

## 2020-10-27 NOTE — ANESTHESIA POSTPROCEDURE EVALUATION
Patient: Alysia Deutsch    Procedure Summary     Date: 10/27/20 Room / Location: [facility] ENDOSCOPY 1 / [facility] ENDOSCOPY    Anesthesia Start: 1338 Anesthesia Stop: 1406    Procedures:       ESOPHAGOGASTRODUODENOSCOPY (N/A )      COLONOSCOPY (N/A ) Diagnosis:       Generalized abdominal pain      Other constipation      Gastroesophageal reflux disease, unspecified whether esophagitis present      (Generalized abdominal pain [R10.84])      (Other constipation [K59.09])      (Gastroesophageal reflux disease, unspecified whether esophagitis present [K21.9])    Surgeon: Carlos Enrique Alcantara MD Provider: Dwight Brantley CRNA    Anesthesia Type: MAC ASA Status: 3          Anesthesia Type: MAC    Vitals  No vitals data found for the desired time range.          Post Anesthesia Care and Evaluation    Patient location during evaluation: bedside  Patient participation: complete - patient participated  Level of consciousness: awake and alert  Pain score: 0  Pain management: adequate  Airway patency: patent  Anesthetic complications: No anesthetic complications  PONV Status: none  Cardiovascular status: acceptable  Respiratory status: acceptable  Hydration status: acceptable    Comments: ---------------------------               10/27/20                      1250         ---------------------------   BP:          107/59         Pulse:         80           Resp:          18           Temp:   96.9 °F (36.1 °C)   SpO2:          95%         ---------------------------

## 2020-10-27 NOTE — ANESTHESIA PREPROCEDURE EVALUATION
Anesthesia Evaluation     Patient summary reviewed   history of anesthetic complications: PONV  NPO Solid Status: > 8 hours             Airway   Mallampati: II  Neck ROM: full  No difficulty expected  Dental    (+) upper dentures    Pulmonary - normal exam   (+) a smoker Former,   Cardiovascular - normal exam  Exercise tolerance: good (4-7 METS)    NYHA Classification: II  ECG reviewed    (+) RIDLEY, hyperlipidemia,       Neuro/Psych  (+) psychiatric history Anxiety,     GI/Hepatic/Renal/Endo    (+)   hepatitis, liver disease,     Musculoskeletal     (+) back pain,   Abdominal    Substance History      OB/GYN          Other   arthritis,                        Anesthesia Plan    ASA 3     MAC     intravenous induction     Anesthetic plan, all risks, benefits, and alternatives have been provided, discussed and informed consent has been obtained with: patient.    Plan discussed with CRNA.       03-Jun-2018 14:58

## 2020-10-28 ENCOUNTER — OFFICE VISIT (OUTPATIENT)
Dept: ORTHOPEDIC SURGERY | Facility: CLINIC | Age: 55
End: 2020-10-28

## 2020-10-28 VITALS — WEIGHT: 129.7 LBS | HEIGHT: 63 IN | BODY MASS INDEX: 22.98 KG/M2

## 2020-10-28 DIAGNOSIS — M75.101 ROTATOR CUFF SYNDROME, RIGHT: ICD-10-CM

## 2020-10-28 DIAGNOSIS — M79.604 LOW BACK PAIN RADIATING TO RIGHT LOWER EXTREMITY: Primary | ICD-10-CM

## 2020-10-28 DIAGNOSIS — M54.50 LOW BACK PAIN RADIATING TO RIGHT LOWER EXTREMITY: Primary | ICD-10-CM

## 2020-10-28 DIAGNOSIS — M25.511 RIGHT SHOULDER PAIN, UNSPECIFIED CHRONICITY: ICD-10-CM

## 2020-10-28 PROCEDURE — 99213 OFFICE O/P EST LOW 20 MIN: CPT | Performed by: ORTHOPAEDIC SURGERY

## 2020-10-28 PROCEDURE — 20610 DRAIN/INJ JOINT/BURSA W/O US: CPT | Performed by: ORTHOPAEDIC SURGERY

## 2020-10-28 NOTE — PROGRESS NOTES
Alysia Deutsch is a 55 y.o. female returns for     Chief Complaint   Patient presents with   • Lumbar Spine - Follow-up   • Right Hip - Follow-up   • Right Shoulder - Follow-up       HISTORY OF PRESENT ILLNESS: Follow up on lumbar spine, right hip, and right shoulder. Pain level of 5/10. Patient states physical therapy helped with shoulder but not her back.  She comes in today has been quite a while since we have seen her.  Since we saw her she had a lobectomy of her lung for lung cancer doing very well with this.  She seems to recovered her shoulder still has some stiffness and is better but she still has problems the right side of her back hurts with activity better with rest she is considering going back to work soon.       CONCURRENT MEDICAL HISTORY:    Past Medical History:   Diagnosis Date   • Back pain    • Hepatitis    • Lung mass    • PONV (postoperative nausea and vomiting)        Allergies   Allergen Reactions   • Codeine Hives and Itching   • Adhesive Tape Rash         Current Outpatient Medications:   •  cyclobenzaprine (FLEXERIL) 10 MG tablet, Take 1 tablet by mouth 3 (Three) Times a Day As Needed for Muscle Spasms., Disp: 30 tablet, Rfl: 0  •  dexlansoprazole (Dexilant) 60 MG capsule, Take 1 capsule by mouth Daily., Disp: 30 capsule, Rfl: 5  •  escitalopram (LEXAPRO) 20 MG tablet, Take 20 mg by mouth Daily., Disp: , Rfl:   •  fluticasone (FLONASE) 50 MCG/ACT nasal spray, 1 spray into the nostril(s) as directed by provider Daily., Disp: , Rfl:   •  lactulose 20 GM/30ML solution solution, Take 30 mL by mouth Daily As Needed (take as needed for consiptation)., Disp: 150 mL, Rfl: 0  •  linaclotide (LINZESS) 290 MCG capsule capsule, Take 1 capsule by mouth Every Morning Before Breakfast., Disp: 30 capsule, Rfl: 5  •  nicotine ([employer] NICOTINE TRANSDERMAL SYS) 14 MG/24HR patch, Place 1 patch on the skin as directed by provider Daily., Disp: , Rfl:   •  nicotine polacrilex (NICORETTE) 4 MG gum, Chew 4 mg As  "Needed for Smoking Cessation., Disp: , Rfl:   •  pravastatin (PRAVACHOL) 40 MG tablet, Take 40 mg by mouth Every Night., Disp: , Rfl:   •  sennosides-docusate (senna-docusate sodium) 8.6-50 MG per tablet, Take 2 tablets by mouth Every Night., Disp: , Rfl:     Past Surgical History:   Procedure Laterality Date   • BREAST AUGMENTATION     • LAPAROSCOPIC TUBAL LIGATION     • THORACOSCOPY Left 8/20/2020    Procedure: LEFT THORACOSCOPY VIDEO ASSISTED THORACOTOMY pulmonary resection thoracic mediastinal lymphadenectomy bronchoscopy;  Surgeon: Horace Christiansen MD;  Location: [facility];  Service: Cardiothoracic;  Laterality: Left;  Bronch [id number]  VATS [id number]           ROS: Review of systems has been updated as of today's date.  All other systems are negative except as noted previously.    PHYSICAL EXAMINATION:       Ht 160 cm (63\")   Wt 58.8 kg (129 lb 11.2 oz)   BMI 22.98 kg/m²     Physical Exam  Constitutional:       Appearance: She is well-developed.   HENT:      Head: Normocephalic and atraumatic.   Eyes:      Pupils: Pupils are equal, round, and reactive to light.   Neck:      Musculoskeletal: Neck supple.   Pulmonary:      Effort: Pulmonary effort is normal.   Musculoskeletal:         General: Swelling and tenderness present.   Skin:     General: Skin is warm and dry.   Neurological:      Mental Status: She is alert and oriented to person, place, and time.         GAIT:     [x]  Normal  []  Antalgic    Assistive device: [x]  None  []  Walker     []  Crutches  []  Cane     []  Wheelchair  []  Stretcher    Ortho Exam  Passive motion intact the shoulder positive impingement pain resistance of the supraspinatus.  Not tender over the AC joint.  Tender right side of lower lumbosacral spine and gluteal notch.  Straight raising is negative strength testing is intact to plantar flexors and dorsiflexors of the ankle sensory exam is intact.  Pain with lateral bending and extension of the spine    No results " found.          ASSESSMENT:    Diagnoses and all orders for this visit:    Low back pain radiating to right lower extremity  -     MRI Lumbar Spine Without Contrast; Future    Right shoulder pain, unspecified chronicity  -     Large Joint Arthrocentesis: R subacromial bursa    Rotator cuff syndrome, right      Large Joint Arthrocentesis: R subacromial bursa  Date/Time: 10/28/2020 9:25 AM  Consent given by: patient  Site marked: site marked  Timeout: Immediately prior to procedure a time out was called to verify the correct patient, procedure, equipment, support staff and site/side marked as required   Supporting Documentation  Indications: pain   Procedure Details  Location: shoulder - R subacromial bursa  Preparation: Patient was prepped and draped in the usual sterile fashion  Needle size: 22 G  Approach: posterior  Medications administered: 4 mL lidocaine 1 %, 2 mL triamcinolone acetonide 40 MG/ML  Patient tolerance: patient tolerated the procedure well with no immediate complications            PLAN this point I would have sent her for MRI scan of her back to support quite some time post injury she has had a well-managed physical therapy program anti-inflammatories as needed.  I would inject the shoulder today with good initial relief.  Thera-Band exercises should continue this if not better there may consider MRI scan at some point we will see her back after MRI scan of her back.  Patient's Body mass index is 22.98 kg/m². BMI is within normal parameters. No follow-up required..      No follow-ups on file.      This document has been electronically signed by Asim Campbell MD on October 29, 2020 07:43 CDT

## 2020-10-29 PROBLEM — M75.101 ROTATOR CUFF SYNDROME, RIGHT: Status: ACTIVE | Noted: 2020-10-29

## 2020-10-30 ENCOUNTER — OFFICE VISIT (OUTPATIENT)
Dept: CARDIAC SURGERY | Facility: CLINIC | Age: 55
End: 2020-10-30

## 2020-10-30 VITALS
WEIGHT: 129 LBS | TEMPERATURE: 98.2 F | HEIGHT: 63 IN | OXYGEN SATURATION: 98 % | BODY MASS INDEX: 22.86 KG/M2 | HEART RATE: 87 BPM | DIASTOLIC BLOOD PRESSURE: 68 MMHG | SYSTOLIC BLOOD PRESSURE: 118 MMHG

## 2020-10-30 DIAGNOSIS — Z48.813 SURGICAL AFTERCARE, RESPIRATORY SYSTEM: ICD-10-CM

## 2020-10-30 DIAGNOSIS — C34.92 ADENOCARCINOMA OF LEFT LUNG (HCC): Primary | ICD-10-CM

## 2020-10-30 LAB
LAB AP CASE REPORT: NORMAL
PATH REPORT.FINAL DX SPEC: NORMAL

## 2020-10-30 PROCEDURE — 99024 POSTOP FOLLOW-UP VISIT: CPT | Performed by: NURSE PRACTITIONER

## 2020-10-30 NOTE — PATIENT INSTRUCTIONS
Return to work after 11/20    Follow up as scheduled for CT of chest approx 6 months from surgery date.

## 2020-10-30 NOTE — PROGRESS NOTES
CVTS Office Progress Note     10/30/2020    lAysia Deutsch  1965    Chief Complaint:    Chief Complaint   Patient presents with   • Lung Nodule     4 wk post op       HPI:       PCP:  Rosario Mckeon MD  Ortho: Dr. Gaines     55 y.o. female with HTN(stable, increased risk stroke, rupture) and Smoker(uncontrolled, increased risk cardiovascular events) insomnia, isolated pulmonary nodule (new, increased risk malignancy).  smokes 0.25 PPD.  Established with [facility] for LLL pulmonary nodule, PET/CT positive.  Underwent PFT, preoperative evaluation, surgical resection was advisable.  Underwent Left lower lobectomy, 8/2020, surgical margins, lymph nodes negative.  Returns today in post surgical follow up.  Pain controlled, some muscle spasms at bedtime.  Dealing with constipation since surgery, poor response to laxatives now seeing GI.  No other associated signs, symptoms or modifying factors.    8/2020 CT/PET: LLL nodule 2.4x1.6 SUB 5.43  8/2020 L VATS Left lower lobectomy  8/2020 Surgical Pathology: Adenocarcinoma, lymph nodes and surgical margins negative for neoplasia.  PT1B reviewed by Dr. Christiansen    The following portions of the patient's history were reviewed and updated as appropriate: allergies, current medications, past family history, past medical history, past social history, past surgical history and problem list.  Recent images independently reviewed.  Available laboratory values reviewed.    PMH:  Past Medical History:   Diagnosis Date   • Back pain    • Hepatitis    • Lung mass    • PONV (postoperative nausea and vomiting)      Past Surgical History:   Procedure Laterality Date   • BREAST AUGMENTATION     • LAPAROSCOPIC TUBAL LIGATION     • THORACOSCOPY Left 8/20/2020    Procedure: LEFT THORACOSCOPY VIDEO ASSISTED THORACOTOMY pulmonary resection thoracic mediastinal lymphadenectomy bronchoscopy;  Surgeon: Horace Christiansen MD;  Location: [facility];  Service: Cardiothoracic;   "Laterality: Left;  Bronch [phone]  VATS [phone]     Family History   Problem Relation Age of Onset   • Diabetes Other    • Cancer Other    • No Known Problems Mother    • Heart disease Father    • Pancreatic cancer Father    • Colon cancer Maternal Grandfather      Social History     Tobacco Use   • Smoking status: Current Some Day Smoker     Packs/day: 0.25     Years: 30.00     Pack years: 7.50     Types: Cigarettes   • Smokeless tobacco: Never Used   • Tobacco comment: 8-17-20 (\"DOWN TO ONE PER DAY\"   Substance Use Topics   • Alcohol use: Not Currently   • Drug use: Never       ALLERGIES:  Allergies   Allergen Reactions   • Codeine Hives and Itching   • Adhesive Tape Rash         MEDICATIONS:    Current Outpatient Medications:   •  cyclobenzaprine (FLEXERIL) 10 MG tablet, Take 1 tablet by mouth 3 (Three) Times a Day As Needed for Muscle Spasms., Disp: 30 tablet, Rfl: 0  •  dexlansoprazole (Dexilant) 60 MG capsule, Take 1 capsule by mouth Daily., Disp: 30 capsule, Rfl: 5  •  escitalopram (LEXAPRO) 20 MG tablet, Take 20 mg by mouth Daily., Disp: , Rfl:   •  fluticasone (FLONASE) 50 MCG/ACT nasal spray, 1 spray into the nostril(s) as directed by provider Daily., Disp: , Rfl:   •  lactulose 20 GM/30ML solution solution, Take 30 mL by mouth Daily As Needed (take as needed for consiptation)., Disp: 150 mL, Rfl: 0  •  linaclotide (LINZESS) 290 MCG capsule capsule, Take 1 capsule by mouth Every Morning Before Breakfast., Disp: 30 capsule, Rfl: 5  •  nicotine ([employer] NICOTINE TRANSDERMAL SYS) 14 MG/24HR patch, Place 1 patch on the skin as directed by provider Daily., Disp: , Rfl:   •  nicotine polacrilex (NICORETTE) 4 MG gum, Chew 4 mg As Needed for Smoking Cessation., Disp: , Rfl:   •  pravastatin (PRAVACHOL) 40 MG tablet, Take 40 mg by mouth Every Night., Disp: , Rfl:   •  sennosides-docusate (senna-docusate sodium) 8.6-50 MG per tablet, Take 2 tablets by mouth Every Night., Disp: , Rfl:       Review of Systems   " "  Constitution: Negative for chills, decreased appetite, fever and weight loss.   HENT: Negative for congestion, nosebleeds and sore throat.    Eyes: Negative for blurred vision, visual disturbance and visual halos.   Cardiovascular: Positive for dyspnea on exertion. Negative for chest pain and leg swelling.   Respiratory: Negative for cough, shortness of breath, sputum production and wheezing.    Endocrine: Negative for cold intolerance and polyuria.   Hematologic/Lymphatic: Negative for bleeding problem. Does not bruise/bleed easily.   Skin: Positive for unusual hair distribution. Negative for flushing and nail changes.   Musculoskeletal: Positive for back pain. Negative for arthritis and joint pain.   Gastrointestinal: Negative for bloating, abdominal pain, hematemesis, melena, nausea and vomiting.   Genitourinary: Negative for flank pain and hematuria.   Neurological: Negative for brief paralysis, difficulty with concentration, focal weakness, light-headedness, loss of balance, numbness, paresthesias and weakness.        No amaurosis fugax, TIA, or CVA.     Psychiatric/Behavioral: Negative for altered mental status, depression, substance abuse and suicidal ideas.   Allergic/Immunologic: Negative for hives and persistent infections.         Vitals:    10/30/20 1119   BP: 118/68   BP Location: Left arm   Patient Position: Sitting   Cuff Size: Adult   Pulse: 87   Temp: 98.2 °F (36.8 °C)   TempSrc: Temporal   SpO2: 98%   Weight: 58.5 kg (129 lb)   Height: 160 cm (63\")     Physical Exam   Constitutional: She is oriented to person, place, and time. She appears well-developed.   HENT:   Head: Normocephalic and atraumatic.   Eyes: Pupils are equal, round, and reactive to light. Conjunctivae are normal.   Neck: Normal range of motion. Neck supple.   Cardiovascular: Normal rate.   Pulmonary/Chest: Effort normal and breath sounds normal. No respiratory distress.   Abdominal: Soft. Bowel sounds are normal. She exhibits no " distension.   Musculoskeletal: Normal range of motion.   Neurological: She is alert and oriented to person, place, and time. No cranial nerve deficit.   Skin: Skin is warm and dry. Capillary refill takes 2 to 3 seconds.   Left thoracotomy incision CDI  CT sutures removed   Psychiatric: Judgment normal.   Nursing note and vitals reviewed.      Assessment & Plan     Independent Review of Studies    2. Adenocarcinoma of left lung (CMS/HCC)  Stage 1 NSCLC.  Status post surgical resection.  Path reviewed by Dr. Christiansen and discussed  Repeat CT of chest without contrast in 6 months, follow up with Dr. Christiansen to review.     3. Surgical aftercare, respiratory system  Lifting restrictions 10lbs left arm x 4weeks  Progressing well  All surgical incisions CDI    May return to work 11/20/2020        Detailed discussion regarding risks, benefits, and treatment plan. Images independently reviewed. Patient understands, agrees, and wishes to proceed with plan.       This document has been electronically signed by MARLEN ZafarCNP-BC Edwina  On October 30, 2020 14:36 CDT      EMR Dragon/Transcription disclaimer:   Much of this encounter note is an electronic transcription/translation of spoken language to printed text. The electronic translation of spoken language may permit erroneous, or at times, nonsensical words or phrases to be inadvertently transcribed; Although I have reviewed the note for such errors, some may still exist.

## 2020-11-03 ENCOUNTER — OFFICE VISIT (OUTPATIENT)
Dept: GASTROENTEROLOGY | Facility: CLINIC | Age: 55
End: 2020-11-03

## 2020-11-03 VITALS
SYSTOLIC BLOOD PRESSURE: 114 MMHG | HEIGHT: 63 IN | BODY MASS INDEX: 22.82 KG/M2 | HEART RATE: 69 BPM | DIASTOLIC BLOOD PRESSURE: 69 MMHG | WEIGHT: 128.8 LBS

## 2020-11-03 DIAGNOSIS — R10.13 EPIGASTRIC PAIN: ICD-10-CM

## 2020-11-03 DIAGNOSIS — K21.00 GASTROESOPHAGEAL REFLUX DISEASE WITH ESOPHAGITIS WITHOUT HEMORRHAGE: Primary | ICD-10-CM

## 2020-11-03 PROCEDURE — 99214 OFFICE O/P EST MOD 30 MIN: CPT | Performed by: INTERNAL MEDICINE

## 2020-11-03 RX ORDER — LANSOPRAZOLE, AMOXICILLIN, CLARITHROMYCIN 30-500-500
KIT ORAL 2 TIMES DAILY
Qty: 28 EACH | Refills: 0 | Status: SHIPPED | OUTPATIENT
Start: 2020-11-03 | End: 2020-11-17

## 2020-11-03 NOTE — PROGRESS NOTES
Chief Complaint   Patient presents with   • Follow-up   • Heartburn       Subjective    Alysia Deutsch is a 55 y.o. female.    History of Present Illness  Presented to GI clinic for follow-up visit today.  Has continued symptoms with epigastric pain.  No further nausea or vomiting.  Constipation has improved with Linzess.  Denied rectal bleeding or weight loss.  EGD was consistent with hernia, esophagitis and gastritis.  Path was consistent with H. pylori gastritis and Orr's esophagus.  Colonoscopy was consistent with hyperplastic polyps and hemorrhoids.       The following portions of the patient's history were reviewed and updated as appropriate:   Past Medical History:   Diagnosis Date   • Back pain    • Hepatitis    • Lung mass    • PONV (postoperative nausea and vomiting)      Past Surgical History:   Procedure Laterality Date   • BREAST AUGMENTATION     • LAPAROSCOPIC TUBAL LIGATION     • THORACOSCOPY Left 8/20/2020    Procedure: LEFT THORACOSCOPY VIDEO ASSISTED THORACOTOMY pulmonary resection thoracic mediastinal lymphadenectomy bronchoscopy;  Surgeon: Horace Christiansen MD;  Location: [facility];  Service: Cardiothoracic;  Laterality: Left;  Bronch [phone]  VATS [phone]     Family History   Problem Relation Age of Onset   • Diabetes Other    • Cancer Other    • No Known Problems Mother    • Heart disease Father    • Pancreatic cancer Father    • Colon cancer Maternal Grandfather      OB History    No obstetric history on file.       Prior to Admission medications    Medication Sig Start Date End Date Taking? Authorizing Provider   dexlansoprazole (Dexilant) 60 MG capsule Take 1 capsule by mouth Daily. 10/13/20  Yes Carlos Enrique Alcantara MD   escitalopram (LEXAPRO) 20 MG tablet Take 20 mg by mouth Daily. 6/23/20  Yes Debbie Menendez MD   fluticasone (FLONASE) 50 MCG/ACT nasal spray 1 spray into the nostril(s) as directed by provider Daily.   Yes Debbie Menendez MD   linaclotide  "(LINZESS) 290 MCG capsule capsule Take 1 capsule by mouth Every Morning Before Breakfast. 10/13/20  Yes Carlos Enrique Alcantara MD   nicotine ([employer] NICOTINE TRANSDERMAL SYS) 14 MG/24HR patch Place 1 patch on the skin as directed by provider Daily.   Yes ProviderDebbie MD   nicotine polacrilex (NICORETTE) 4 MG gum Chew 4 mg As Needed for Smoking Cessation.   Yes ProviderDebbie MD   pravastatin (PRAVACHOL) 40 MG tablet Take 40 mg by mouth Every Night.   Yes ProviderDebbie MD   amoxicillin-clarithromycin-lansoprazole (Prevpac) combo pack Take  by mouth 2 (Two) Times a Day for 14 days. Follow package directions. 11/3/20 11/17/20  Carlos Enrique Alcantara MD   cyclobenzaprine (FLEXERIL) 10 MG tablet Take 1 tablet by mouth 3 (Three) Times a Day As Needed for Muscle Spasms. 8/31/20 11/3/20  Ruben Hammer APRN   lactulose 20 GM/30ML solution solution Take 30 mL by mouth Daily As Needed (take as needed for consiptation). 10/2/20 11/3/20  Ruben Hammer APRN   sennosides-docusate (senna-docusate sodium) 8.6-50 MG per tablet Take 2 tablets by mouth Every Night. 8/24/20 11/3/20  Ruben Hammer APRN     Allergies   Allergen Reactions   • Codeine Hives and Itching   • Adhesive Tape Rash     Social History     Socioeconomic History   • Marital status:      Spouse name: Not on file   • Number of children: Not on file   • Years of education: Not on file   • Highest education level: Not on file   Tobacco Use   • Smoking status: Current Some Day Smoker     Packs/day: 0.25     Years: 30.00     Pack years: 7.50     Types: Cigarettes   • Smokeless tobacco: Never Used   • Tobacco comment: 8-17-20 (\"DOWN TO ONE PER DAY\"   Substance and Sexual Activity   • Alcohol use: Not Currently   • Drug use: Not Currently   • Sexual activity: Defer       Review of Systems  Review of Systems   Constitutional: Negative for chills, fatigue, fever and unexpected weight change.   HENT: Negative for congestion, ear discharge, " "hearing loss, nosebleeds and sore throat.    Eyes: Negative for pain, discharge and redness.   Respiratory: Negative for cough, chest tightness, shortness of breath and wheezing.    Cardiovascular: Negative for chest pain and palpitations.   Gastrointestinal: Positive for abdominal pain. Negative for abdominal distention, blood in stool, constipation, diarrhea, nausea and vomiting.   Endocrine: Negative for cold intolerance, polydipsia, polyphagia and polyuria.   Genitourinary: Negative for dysuria, flank pain, frequency, hematuria and urgency.   Musculoskeletal: Negative for arthralgias, back pain, joint swelling and myalgias.   Skin: Negative for color change, pallor and rash.   Neurological: Negative for tremors, seizures, syncope, weakness and headaches.   Hematological: Negative for adenopathy. Does not bruise/bleed easily.   Psychiatric/Behavioral: Negative for behavioral problems, confusion, dysphoric mood, hallucinations and suicidal ideas. The patient is not nervous/anxious.         /69 (BP Location: Right arm, Patient Position: Sitting)   Pulse 69   Ht 160 cm (63\")   Wt 58.4 kg (128 lb 12.8 oz)   BMI 22.82 kg/m²     Objective    Physical Exam  Constitutional:       Appearance: She is well-developed.   HENT:      Head: Normocephalic and atraumatic.   Eyes:      Conjunctiva/sclera: Conjunctivae normal.      Pupils: Pupils are equal, round, and reactive to light.   Neck:      Musculoskeletal: Normal range of motion and neck supple.      Thyroid: No thyromegaly.   Cardiovascular:      Rate and Rhythm: Normal rate and regular rhythm.      Heart sounds: Normal heart sounds. No murmur.   Pulmonary:      Effort: Pulmonary effort is normal.      Breath sounds: Normal breath sounds. No wheezing.   Abdominal:      General: Bowel sounds are normal. There is no distension.      Palpations: Abdomen is soft. There is no mass.      Tenderness: There is no abdominal tenderness.      Hernia: No hernia is present.  "   Genitourinary:     Comments: No lesions noted  Musculoskeletal: Normal range of motion.         General: No tenderness.   Lymphadenopathy:      Cervical: No cervical adenopathy.   Skin:     General: Skin is warm and dry.      Findings: No rash.   Neurological:      Mental Status: She is alert and oriented to person, place, and time.      Cranial Nerves: No cranial nerve deficit.   Psychiatric:         Thought Content: Thought content normal.       Admission on 10/27/2020, Discharged on 10/27/2020   Component Date Value Ref Range Status   • Case Report 10/27/2020    Final                    Value:Surgical Pathology Report                         Case: [id number]                                  Authorizing Provider:  Carlos Enrique Alcantara MD      Collected:           10/27/2020 01:58 PM          Ordering Location:     [facility]             Received:            10/28/2020 07:41 AM                                 [place] [facility]                                                     Pathologist:           Jakob Franks MD                                                           Specimens:   1) - Gastric, Antrum                                                                                2) - Esophagus, Eso gastric junction                                                                3) - Large Intestine, Sigmoid Colon, polyp                                                • Final Diagnosis 10/27/2020    Final                    Value:This result contains rich text formatting which cannot be displayed here.     Assessment/Plan    No diagnosis found..   1.  Epigastric pain, likely due to H. pylori gastritis.  Add Prevpac for 2 weeks.  Continue PPI.  2.  GERD, well controlled with PPI.  Continue PPI and antireflux lifestyle.  3.  Constipation, well controlled with Linzess.  Continue Linzess and high-fiber diet.  4.  Dysphagia, improved.  Continue PPI.  5.  Orr's esophagus, repeat EGD in 1 year  6.   Chest pain, resolved.  Likely atypical GERD.  Continue PPI and antireflux lifestyle.  7. Tobacco usage, recommend cessation.  Orders placed during this encounter include:  No orders of the defined types were placed in this encounter.      * Surgery not found *    Review and/or summary of lab tests, radiology, procedures, medications. Review and summary of old records and obtaining of history. The risks and benefits of my recommendations, as well as other treatment options were discussed with the patient today. Questions were answered.    New Medications Ordered This Visit   Medications   • amoxicillin-clarithromycin-lansoprazole (Prevpac) combo pack     Sig: Take  by mouth 2 (Two) Times a Day for 14 days. Follow package directions.     Dispense:  28 each     Refill:  0       Follow-up: No follow-ups on file.               Results for orders placed or performed during the hospital encounter of 10/27/20   Tissue Pathology Exam    Specimen: A: Gastric, Antrum; Tissue    B: Esophagus; Tissue    C: Large Intestine, Sigmoid Colon; Polyp   Result Value Ref Range    Case Report       Surgical Pathology Report                         Case: [id number]                                  Authorizing Provider:  Carlos Enrique Alcantara MD      Collected:           10/27/2020 01:58 PM          Ordering Location[facility]     [facility]             Received:            10/28/2020 07:41 AM                                 [place] [facility]                                                     Pathologist:           Jakob Franks MD                                                           Specimens:   1) - Gastric, Antrum                                                                                2) - Esophagus, Eso gastric junction                                                                3) - Large Intestine, Sigmoid Colon, polyp                                                 Final Diagnosis       See Scanned Report          Results for orders placed or performed in visit on 10/24/20   COVID [facility] Priority - Swab, Nasopharynx    Specimen: Nasopharynx; Swab   Result Value Ref Range    COVID [facility] PRIORITY Comment    COVID-19,[facility] ROUTINE, NP/OP SWAB IN TRANSPORT MEDIA OR ESWAB 72 HR TAT - Swab, Nasopharynx    Specimen: Nasopharynx; Swab   Result Value Ref Range    SARS-CoV-2, LEANNE Not Detected Not Detected   Results for orders placed or performed during the hospital encounter of 08/17/20   PREVIOUS HISTORY    Specimen: Blood   Result Value Ref Range    Previous History Previous Record on File    COVID [facility] Priority - Swab, Nasopharynx    Specimen: Nasopharynx; Swab   Result Value Ref Range    COVID [facility] PRIORITY Comment    COVID-19,[facility] ROUTINE, NP/OP SWAB IN TRANSPORT MEDIA OR ESWAB 72 HR TAT - Swab, Nasopharynx    Specimen: Nasopharynx; Swab   Result Value Ref Range    SARS-CoV-2, LEANNE Not Detected Not Detected   CBC Auto Differential    Specimen: Blood   Result Value Ref Range    WBC 9.18 3.40 - 10.80 10*3/mm3    RBC 3.32 (L) 3.77 - 5.28 10*6/mm3    Hemoglobin 11.0 (L) 12.0 - 15.9 g/dL    Hematocrit 31.3 (L) 34.0 - 46.6 %    MCV 94.3 79.0 - 97.0 fL    MCH 33.1 (H) 26.6 - 33.0 pg    MCHC 35.1 31.5 - 35.7 g/dL    RDW 13.6 12.3 - 15.4 %    RDW-SD 46.5 37.0 - 54.0 fl    MPV 8.5 6.0 - 12.0 fL    Platelets 278 140 - 450 10*3/mm3    Neutrophil % 67.0 42.7 - 76.0 %    Lymphocyte % 22.1 19.6 - 45.3 %    Monocyte % 10.1 5.0 - 12.0 %    Eosinophil % 0.3 0.3 - 6.2 %    Basophil % 0.1 0.0 - 1.5 %    Immature Grans % 0.4 0.0 - 0.5 %    Neutrophils, Absolute 6.14 1.70 - 7.00 10*3/mm3    Lymphocytes, Absolute 2.03 0.70 - 3.10 10*3/mm3    Monocytes, Absolute 0.93 (H) 0.10 - 0.90 10*3/mm3    Eosinophils, Absolute 0.03 0.00 - 0.40 10*3/mm3    Basophils, Absolute 0.01 0.00 - 0.20 10*3/mm3    Immature Grans, Absolute 0.04 0.00 - 0.05 10*3/mm3    nRBC 0.0 0.0 - 0.2 /100 WBC   Tissue Pathology Exam    Specimen: A: Lung, Left Lower Lobe;  Tissue    B: Lymph Node; Tissue    C: Lung, Left Lower Lobe; Tissue    D: Lymph Node; Tissue    E: Lymph Node; Tissue   Result Value Ref Range    Case Report       Surgical Pathology Report                         Case: [id number]                                  Authorizing Provider:  Horace Christiansen MD Collected:           08/20/2020 08:31 AM          Ordering Location:     [facility]             Received:            08/20/2020 08:47 AM                                 [place]                                                              Pathologist:           Jakob Franks MD                                                           Specimens:   1) - Lung, Left Lower Lobe, Left Lower Lobe                                                         2) - Lymph Node, Level 10 Lymph Node                                                                3) - Lung, Left Lower Lobe, Left Lower Lobe                                                         4) - Lymph Node, Level 8 Lymph Node                                                                 5) - Lymph Node, Level 6 Lylmph Node                                                       Final Diagnosis       SEE SCANNED REPORT       Type & Screen    Specimen: Blood   Result Value Ref Range    ABO Type O     RH type Positive     Antibody Screen Negative     T&S Expiration Date 8/23/2020 11:59:59 PM    Basic Metabolic Panel    Specimen: Blood   Result Value Ref Range    Glucose 93 65 - 99 mg/dL    BUN 6 6 - 20 mg/dL    Creatinine 0.67 0.57 - 1.00 mg/dL    Sodium 131 (L) 136 - 145 mmol/L    Potassium 4.2 3.5 - 5.2 mmol/L    Chloride 100 98 - 107 mmol/L    CO2 23.0 22.0 - 29.0 mmol/L    Calcium 7.9 (L) 8.6 - 10.5 mg/dL    eGFR Non African Amer 91 >60 mL/min/1.73    BUN/Creatinine Ratio 9.0 7.0 - 25.0    Anion Gap 8.0 5.0 - 15.0 mmol/L   Results for orders placed or performed in visit on 08/17/20   PREVIOUS HISTORY    Specimen: Blood   Result Value Ref Range     Previous History Patient needs ABO/RH on day of surgery.    CBC (No Diff)    Specimen: Blood   Result Value Ref Range    WBC 6.94 3.40 - 10.80 10*3/mm3    RBC 3.85 3.77 - 5.28 10*6/mm3    Hemoglobin 12.6 12.0 - 15.9 g/dL    Hematocrit 35.4 34.0 - 46.6 %    MCV 91.9 79.0 - 97.0 fL    MCH 32.7 26.6 - 33.0 pg    MCHC 35.6 31.5 - 35.7 g/dL    RDW 13.2 12.3 - 15.4 %    RDW-SD 44.6 37.0 - 54.0 fl    MPV 8.2 6.0 - 12.0 fL    Platelets 319 140 - 450 10*3/mm3   Type & Screen    Specimen: Blood   Result Value Ref Range    ABO Type O     RH type Positive     Antibody Screen Negative     T&S Expiration Date 8/20/2020 11:59:59 PM          This document has been electronically signed by Carlos Enrique Alcantara MD on November 3, 2020 16:17 CST

## 2020-11-03 NOTE — PATIENT INSTRUCTIONS
"BMI for Adults  What is BMI?  Body mass index (BMI) is a number that is calculated from a person's weight and height. BMI can help estimate how much of a person's weight is composed of fat. BMI does not measure body fat directly. Rather, it is an alternative to procedures that directly measure body fat, which can be difficult and expensive.  BMI can help identify people who may be at higher risk for certain medical problems.  What are BMI measurements used for?  BMI is used as a screening tool to identify possible weight problems. It helps determine whether a person is obese, overweight, a healthy weight, or underweight.  BMI is useful for:  · Identifying a weight problem that may be related to a medical condition or may increase the risk for medical problems.  · Promoting changes, such as changes in diet and exercise, to help reach a healthy weight. BMI screening can be repeated to see if these changes are working.  How is BMI calculated?  BMI involves measuring your weight in relation to your height. Both height and weight are measured, and the BMI is calculated from those numbers. This can be done either in English ([place] or metric measurements. Note that charts and online BMI calculators are available to help you find your BMI quickly and easily without having to do these calculations yourself.  To calculate your BMI in English ([place] measurements:    1. Measure your weight in pounds (lb).  2. Multiply the number of pounds by 703.  ? For example, for a person who weighs 180 lb, multiply that number by 703, which equals 126,540.  3. Measure your height in inches. Then multiply that number by itself to get a measurement called \"inches squared.\"  ? For example, for a person who is 70 inches tall, the \"inches squared\" measurement is 70 inches x 70 inches, which equals 4,900 inches squared.  4. Divide the total from step 2 (number of lb x 703) by the total from step 3 (inches squared): 126,540 ÷ 4,900 = 25.8. This is " "your BMI.  To calculate your BMI in metric measurements:  1. Measure your weight in kilograms (kg).  2. Measure your height in meters (m). Then multiply that number by itself to get a measurement called \"meters squared.\"  ? For example, for a person who is 1.75 m tall, the \"meters squared\" measurement is 1.75 m x 1.75 m, which is equal to 3.1 meters squared.  3. Divide the number of kilograms (your weight) by the meters squared number. In this example: 70 ÷ 3.1 = 22.6. This is your BMI.  What do the results mean?  BMI charts are used to identify whether you are underweight, normal weight, overweight, or obese. The following guidelines will be used:  · Underweight: BMI less than 18.5.  · Normal weight: BMI between 18.5 and 24.9.  · Overweight: BMI between 25 and 29.9.  · Obese: BMI of 30 or above.  Keep these notes in mind:  · Weight includes both fat and muscle, so someone with a muscular build, such as an athlete, may have a BMI that is higher than 24.9. In cases like these, BMI is not an accurate measure of body fat.  · To determine if excess body fat is the cause of a BMI of 25 or higher, further assessments may need to be done by a health care provider.  · BMI is usually interpreted in the same way for men and women.  Where to find more information  For more information about BMI, including tools to quickly calculate your BMI, go to these websites:  · Centers for Disease Control and Prevention: www.cdc.gov  · American Heart Association: www.heart.org  · National Heart, Lung, and Blood [place]: www.nhlbi.nih.gov  Summary  · Body mass index (BMI) is a number that is calculated from a person's weight and height.  · BMI may help estimate how much of a person's weight is composed of fat. BMI can help identify those who may be at higher risk for certain medical problems.  · BMI can be measured using English measurements or metric measurements.  · BMI charts are used to identify whether you are underweight, normal " weight, overweight, or obese.  This information is not intended to replace advice given to you by your health care provider. Make sure you discuss any questions you have with your health care provider.  Document Released: 08/29/2005 Document Revised: 09/09/2020 Document Reviewed: 07/17/2020  Elsevier Patient Education © 2020 [employer]

## 2020-11-05 ENCOUNTER — HOSPITAL ENCOUNTER (OUTPATIENT)
Dept: MRI IMAGING | Facility: HOSPITAL | Age: 55
Discharge: HOME | End: 2020-11-05
Admitting: ORTHOPAEDIC SURGERY

## 2020-11-05 DIAGNOSIS — M54.50 LOW BACK PAIN RADIATING TO RIGHT LOWER EXTREMITY: ICD-10-CM

## 2020-11-05 DIAGNOSIS — M79.604 LOW BACK PAIN RADIATING TO RIGHT LOWER EXTREMITY: ICD-10-CM

## 2020-11-05 PROCEDURE — 72148 MRI LUMBAR SPINE W/O DYE: CPT

## 2020-11-11 ENCOUNTER — OFFICE VISIT (OUTPATIENT)
Dept: ORTHOPEDIC SURGERY | Facility: CLINIC | Age: 55
End: 2020-11-11

## 2020-11-11 ENCOUNTER — TELEPHONE (OUTPATIENT)
Dept: ORTHOPEDIC SURGERY | Facility: CLINIC | Age: 55
End: 2020-11-11

## 2020-11-11 VITALS — WEIGHT: 128 LBS | BODY MASS INDEX: 22.68 KG/M2 | HEIGHT: 63 IN

## 2020-11-11 DIAGNOSIS — M54.50 LOW BACK PAIN RADIATING TO RIGHT LOWER EXTREMITY: Primary | ICD-10-CM

## 2020-11-11 DIAGNOSIS — M25.551 RIGHT HIP PAIN: ICD-10-CM

## 2020-11-11 DIAGNOSIS — M79.604 LOW BACK PAIN RADIATING TO RIGHT LOWER EXTREMITY: Primary | ICD-10-CM

## 2020-11-11 PROCEDURE — 99213 OFFICE O/P EST LOW 20 MIN: CPT | Performed by: ORTHOPAEDIC SURGERY

## 2020-11-11 PROCEDURE — 96372 THER/PROPH/DIAG INJ SC/IM: CPT | Performed by: ORTHOPAEDIC SURGERY

## 2020-11-11 RX ORDER — METHYLPREDNISOLONE ACETATE 40 MG/ML
80 INJECTION, SUSPENSION INTRA-ARTICULAR; INTRALESIONAL; INTRAMUSCULAR; SOFT TISSUE ONCE
Status: COMPLETED | OUTPATIENT
Start: 2020-11-11 | End: 2020-11-11

## 2020-11-11 RX ADMIN — METHYLPREDNISOLONE ACETATE 80 MG: 40 INJECTION, SUSPENSION INTRA-ARTICULAR; INTRALESIONAL; INTRAMUSCULAR; SOFT TISSUE at 09:52

## 2020-11-11 NOTE — TELEPHONE ENCOUNTER
GRADY      Pt CALLED STATING SHE WAS REVIEWING HER WORK NOTE AND SHE IS REQUESTING THAT NO TWISTING BE ADDED TO HER WORK NOTE BECAUSE SHE DOES A LOT OF TWISTING AT WORK. SHE STATES YOU AND HER HAD DISCUSSED THIS WHILE SHE WAS IN OFFICE      PLEASE ADVISE   CALL BACK # [phone]

## 2020-11-11 NOTE — PROGRESS NOTES
Alysia Deutsch is a 55 y.o. female returns for     Chief Complaint   Patient presents with   • Lumbar Spine - Follow-up, Pain   • Results       HISTORY OF PRESENT ILLNESS: f/u back pain, mri lumbar spine done on 11/5/2020.  Her symptoms are about the same.  She goes back to work in about 11 days.       CONCURRENT MEDICAL HISTORY:    Past Medical History:   Diagnosis Date   • Back pain    • Hepatitis    • Lung mass    • PONV (postoperative nausea and vomiting)        Allergies   Allergen Reactions   • Codeine Hives and Itching   • Adhesive Tape Rash         Current Outpatient Medications:   •  amoxicillin-clarithromycin-lansoprazole (Prevpac) combo pack, Take  by mouth 2 (Two) Times a Day for 14 days. Follow package directions., Disp: 28 each, Rfl: 0  •  dexlansoprazole (Dexilant) 60 MG capsule, Take 1 capsule by mouth Daily., Disp: 30 capsule, Rfl: 5  •  escitalopram (LEXAPRO) 20 MG tablet, Take 20 mg by mouth Daily., Disp: , Rfl:   •  fluticasone (FLONASE) 50 MCG/ACT nasal spray, 1 spray into the nostril(s) as directed by provider Daily., Disp: , Rfl:   •  linaclotide (LINZESS) 290 MCG capsule capsule, Take 1 capsule by mouth Every Morning Before Breakfast., Disp: 30 capsule, Rfl: 5  •  nicotine ([employer] NICOTINE TRANSDERMAL SYS) 14 MG/24HR patch, Place 1 patch on the skin as directed by provider Daily., Disp: , Rfl:   •  nicotine polacrilex (NICORETTE) 4 MG gum, Chew 4 mg As Needed for Smoking Cessation., Disp: , Rfl:   •  pravastatin (PRAVACHOL) 40 MG tablet, Take 40 mg by mouth Every Night., Disp: , Rfl:   No current facility-administered medications for this visit.     Past Surgical History:   Procedure Laterality Date   • BREAST AUGMENTATION     • COLONOSCOPY N/A 10/27/2020    Procedure: COLONOSCOPY;  Surgeon: Carlos Enrique Alcantara MD;  Location: [facility] ENDOSCOPY;  Service: Gastroenterology;  Laterality: N/A;   • ENDOSCOPY N/A 10/27/2020    Procedure: ESOPHAGOGASTRODUODENOSCOPY;  Surgeon: Carlos Enrique Alcantara MD;   "Location: [facility] ENDOSCOPY;  Service: Gastroenterology;  Laterality: N/A;   • LAPAROSCOPIC TUBAL LIGATION     • THORACOSCOPY Left 8/20/2020    Procedure: LEFT THORACOSCOPY VIDEO ASSISTED THORACOTOMY pulmonary resection thoracic mediastinal lymphadenectomy bronchoscopy;  Surgeon: Horace Christiansen MD;  Location: [facility] OR;  Service: Cardiothoracic;  Laterality: Left;  Bronch [phone]  VATS [phone]           ROS: Review of systems has been updated as of today's date.  All other systems are negative except as noted previously.    PHYSICAL EXAMINATION:       Ht 160 cm (63\")   Wt 58.1 kg (128 lb)   BMI 22.67 kg/m²     Physical Exam  Constitutional:       Appearance: She is well-developed.   HENT:      Head: Normocephalic and atraumatic.   Eyes:      Pupils: Pupils are equal, round, and reactive to light.   Neck:      Musculoskeletal: Neck supple.   Pulmonary:      Effort: Pulmonary effort is normal.   Musculoskeletal:         General: Tenderness present.   Skin:     General: Skin is warm and dry.   Neurological:      Mental Status: She is alert and oriented to person, place, and time.         GAIT:     []  Normal  []  Antalgic    Assistive device: [x]  None  []  Walker     []  Crutches  []  Cane     []  Wheelchair  []  Stretcher    Ortho Exam  Tender the lower lumbosacral spine good motion of the hips grossly neurovascular intact  Mri Lumbar Spine Without Contrast    Result Date: 11/5/2020  Narrative: EXAM DESCRIPTION: MRI LUMBAR SPINE WO CONTRAST CLINICAL HISTORY: 55-year-old female complains of low back pain radiating into the right lower extremity. COMPARISON: Radiographs 6/29/2020 FINDINGS:  Sagittal and axial T1 and T2 MR images of lumbar spine are submitted for interpretation. No acute or suspicious findings within the included paraspinal soft tissues. There is anatomic alignment of the lumbar and the included thoracic vertebra. No compression fracture, suspicious marrow replacement or osseous lesion. " The conus terminates at the inferior margin of L1 and demonstrates normal signal and morphology. There is incomplete loss of T2 disc signal and very mild loss of disc space height at L3-4. T12-L1:  No significant disc bulge, protrusion/extrusion, or stenosis. L1-L2:  No significant disc bulge, protrusion/extrusion, or stenosis. L2-L3:  No significant disc bulge, protrusion/extrusion, or stenosis. L3-L4: Minimal disc bulge without protrusion/extrusion or stenosis. L4-L5: Minimal disc bulge without protrusion/extrusion or stenosis. L5-S1: No significant disc bulge, protrusion/extrusion, or stenosis.     Impression: Very mild degenerative disc disease. There is no protrusion/extrusion, central spinal canal or foraminal stenosis. Electronically signed by:  Evangelist Lui MD  11/5/2020 12:47 PM CST Workstation: [id number]    Reviewed her scan and agree with her findings        ASSESSMENT:    Diagnoses and all orders for this visit:    Low back pain radiating to right lower extremity  -     methylPREDNISolone acetate (DEPO-medrol) injection 80 mg    Right hip pain  -     methylPREDNISolone acetate (DEPO-medrol) injection 80 mg          PLAN I explained to her I do not really see any operative findings here.  I think this will get better with conservative care.  We recommended a lumbosacral corset and have gone over the Internet with her once she can order.  We will try another shot of Depo-Medrol which will be intramuscular injection.  We talked about modifying her activity at work to protect herself.  She will call with any problems I will see her back at any time I written her light duty at work for a month to see if she can ease back into this before going back to full duty I think that would be best for her.  No follow-ups on file.      This document has been electronically signed by Asim Campbell MD on November 11, 2020 11:22 CST

## 2020-11-17 ENCOUNTER — DOCUMENTATION (OUTPATIENT)
Dept: ORTHOPEDIC SURGERY | Facility: CLINIC | Age: 55
End: 2020-11-17

## 2020-12-07 ENCOUNTER — OFFICE VISIT (OUTPATIENT)
Dept: GASTROENTEROLOGY | Facility: CLINIC | Age: 55
End: 2020-12-07

## 2020-12-07 VITALS
HEIGHT: 63 IN | BODY MASS INDEX: 22.39 KG/M2 | WEIGHT: 126.4 LBS | HEART RATE: 73 BPM | SYSTOLIC BLOOD PRESSURE: 125 MMHG | DIASTOLIC BLOOD PRESSURE: 79 MMHG

## 2020-12-07 DIAGNOSIS — R10.13 EPIGASTRIC PAIN: Primary | ICD-10-CM

## 2020-12-07 PROCEDURE — 99214 OFFICE O/P EST MOD 30 MIN: CPT | Performed by: INTERNAL MEDICINE

## 2020-12-07 RX ORDER — DICYCLOMINE HCL 20 MG
20 TABLET ORAL EVERY 6 HOURS
Qty: 120 TABLET | Refills: 2 | Status: SHIPPED | OUTPATIENT
Start: 2020-12-07 | End: 2021-02-25

## 2020-12-15 ENCOUNTER — HOSPITAL ENCOUNTER (OUTPATIENT)
Dept: ULTRASOUND IMAGING | Facility: HOSPITAL | Age: 55
Discharge: HOME | End: 2020-12-15
Admitting: INTERNAL MEDICINE

## 2020-12-15 PROCEDURE — 76705 ECHO EXAM OF ABDOMEN: CPT

## 2020-12-18 ENCOUNTER — OFFICE VISIT (OUTPATIENT)
Dept: GASTROENTEROLOGY | Facility: CLINIC | Age: 55
End: 2020-12-18

## 2020-12-18 VITALS
BODY MASS INDEX: 22.01 KG/M2 | DIASTOLIC BLOOD PRESSURE: 79 MMHG | SYSTOLIC BLOOD PRESSURE: 121 MMHG | HEART RATE: 81 BPM | WEIGHT: 124.2 LBS | HEIGHT: 63 IN

## 2020-12-18 DIAGNOSIS — R10.13 EPIGASTRIC PAIN: Primary | ICD-10-CM

## 2020-12-18 DIAGNOSIS — R10.10 PAIN OF UPPER ABDOMEN: ICD-10-CM

## 2020-12-18 PROCEDURE — 99214 OFFICE O/P EST MOD 30 MIN: CPT | Performed by: INTERNAL MEDICINE

## 2020-12-18 RX ORDER — SUCRALFATE 1 G/1
1 TABLET ORAL 4 TIMES DAILY
Qty: 120 TABLET | Refills: 0 | Status: SHIPPED | OUTPATIENT
Start: 2020-12-18 | End: 2021-01-17

## 2020-12-18 NOTE — PATIENT INSTRUCTIONS
"BMI for Adults  What is BMI?  Body mass index (BMI) is a number that is calculated from a person's weight and height. BMI can help estimate how much of a person's weight is composed of fat. BMI does not measure body fat directly. Rather, it is an alternative to procedures that directly measure body fat, which can be difficult and expensive.  BMI can help identify people who may be at higher risk for certain medical problems.  What are BMI measurements used for?  BMI is used as a screening tool to identify possible weight problems. It helps determine whether a person is obese, overweight, a healthy weight, or underweight.  BMI is useful for:  · Identifying a weight problem that may be related to a medical condition or may increase the risk for medical problems.  · Promoting changes, such as changes in diet and exercise, to help reach a healthy weight. BMI screening can be repeated to see if these changes are working.  How is BMI calculated?  BMI involves measuring your weight in relation to your height. Both height and weight are measured, and the BMI is calculated from those numbers. This can be done either in English ([place] or metric measurements. Note that charts and online BMI calculators are available to help you find your BMI quickly and easily without having to do these calculations yourself.  To calculate your BMI in English ([place] measurements:    1. Measure your weight in pounds (lb).  2. Multiply the number of pounds by 703.  ? For example, for a person who weighs 180 lb, multiply that number by 703, which equals 126,540.  3. Measure your height in inches. Then multiply that number by itself to get a measurement called \"inches squared.\"  ? For example, for a person who is 70 inches tall, the \"inches squared\" measurement is 70 inches x 70 inches, which equals 4,900 inches squared.  4. Divide the total from step 2 (number of lb x 703) by the total from step 3 (inches squared): 126,540 ÷ 4,900 = 25.8. This is " "your BMI.  To calculate your BMI in metric measurements:  1. Measure your weight in kilograms (kg).  2. Measure your height in meters (m). Then multiply that number by itself to get a measurement called \"meters squared.\"  ? For example, for a person who is 1.75 m tall, the \"meters squared\" measurement is 1.75 m x 1.75 m, which is equal to 3.1 meters squared.  3. Divide the number of kilograms (your weight) by the meters squared number. In this example: 70 ÷ 3.1 = 22.6. This is your BMI.  What do the results mean?  BMI charts are used to identify whether you are underweight, normal weight, overweight, or obese. The following guidelines will be used:  · Underweight: BMI less than 18.5.  · Normal weight: BMI between 18.5 and 24.9.  · Overweight: BMI between 25 and 29.9.  · Obese: BMI of 30 or above.  Keep these notes in mind:  · Weight includes both fat and muscle, so someone with a muscular build, such as an athlete, may have a BMI that is higher than 24.9. In cases like these, BMI is not an accurate measure of body fat.  · To determine if excess body fat is the cause of a BMI of 25 or higher, further assessments may need to be done by a health care provider.  · BMI is usually interpreted in the same way for men and women.  Where to find more information  For more information about BMI, including tools to quickly calculate your BMI, go to these websites:  · Centers for Disease Control and Prevention: www.cdc.gov  · American Heart Association: www.heart.org  · National Heart, Lung, and [employer]: www.nhlbi.nih.gov  Summary  · Body mass index (BMI) is a number that is calculated from a person's weight and height.  · BMI may help estimate how much of a person's weight is composed of fat. BMI can help identify those who may be at higher risk for certain medical problems.  · BMI can be measured using English measurements or metric measurements.  · BMI charts are used to identify whether you are underweight, normal " weight, overweight, or obese.  This information is not intended to replace advice given to you by your health care provider. Make sure you discuss any questions you have with your health care provider.  Document Revised: 09/09/2020 Document Reviewed: 07/17/2020  [employer] Patient Education © 2020 [employer]

## 2020-12-20 NOTE — PROGRESS NOTES
Chief Complaint   Patient presents with   • Abdominal Pain       Subjective    Alysia Deutsch is a 55 y.o. female.    History of Present Illness  Patient presented to GI clinic for follow-up visit today.  Has intermittent bouts of epigastric pain and GERD.  Constipation has improved with Linzess and high-fiber diet.  Taking PPI daily.  Denied NSAID usage.  EGD was consistent with H. pylori gastritis and Orr's esophagus.  Completed prevpac. Right upper quadrant sonogram was unremarkable.       The following portions of the patient's history were reviewed and updated as appropriate:   Past Medical History:   Diagnosis Date   • Back pain    • Hepatitis    • Lung mass    • PONV (postoperative nausea and vomiting)      Past Surgical History:   Procedure Laterality Date   • BREAST AUGMENTATION     • COLONOSCOPY N/A 10/27/2020    Procedure: COLONOSCOPY;  Surgeon: Carlos Enrique Alcantara MD;  Location: [facility] ENDOSCOPY;  Service: Gastroenterology;  Laterality: N/A;   • ENDOSCOPY N/A 10/27/2020    Procedure: ESOPHAGOGASTRODUODENOSCOPY;  Surgeon: Carlos Enrique Alcantara MD;  Location: [facility] ENDOSCOPY;  Service: Gastroenterology;  Laterality: N/A;   • LAPAROSCOPIC TUBAL LIGATION     • THORACOSCOPY Left 8/20/2020    Procedure: LEFT THORACOSCOPY VIDEO ASSISTED THORACOTOMY pulmonary resection thoracic mediastinal lymphadenectomy bronchoscopy;  Surgeon: Horace Christiansen MD;  Location: [facility] OR;  Service: Cardiothoracic;  Laterality: Left;  [place] [phone]  VATS [phone]     Family History   Problem Relation Age of Onset   • Diabetes Other    • Cancer Other    • No Known Problems Mother    • Heart disease Father    • Pancreatic cancer Father    • Colon cancer Maternal Grandfather      OB History    No obstetric history on file.       Prior to Admission medications    Medication Sig Start Date End Date Taking? Authorizing Provider   dexlansoprazole (Dexilant) 60 MG capsule Take 1 capsule by mouth Daily. 10/13/20  Yes Quinton  "MD Carlos Enrique   dicyclomine (Bentyl) 20 MG tablet Take 1 tablet by mouth Every 6 (Six) Hours for 30 days. 12/7/20 1/6/21 Yes Carlos Enrique Alcantara MD   escitalopram (LEXAPRO) 20 MG tablet Take 20 mg by mouth Daily. 6/23/20  Yes Debbie Menendez MD   fluticasone (FLONASE) 50 MCG/ACT nasal spray 1 spray into the nostril(s) as directed by provider Daily.   Yes Debbie Menendez MD   linaclotide (LINZESS) 290 MCG capsule capsule Take 1 capsule by mouth Every Morning Before Breakfast. 10/13/20  Yes Carlos Enrique Alcantara MD   nicotine ([employer] NICOTINE TRANSDERMAL SYS) 14 MG/24HR patch Place 1 patch on the skin as directed by provider Daily.   Yes Debbie Menendez MD   nicotine polacrilex (NICORETTE) 4 MG gum Chew 4 mg As Needed for Smoking Cessation.   Yes Debbie Menendez MD   pravastatin (PRAVACHOL) 40 MG tablet Take 40 mg by mouth Every Night.   Yes ProviderDebbie MD   sucralfate (Carafate) 1 g tablet Take 1 tablet by mouth 4 (Four) Times a Day for 30 days. 12/18/20 1/17/21  Carlos Enrique Alcantara MD     Allergies   Allergen Reactions   • Codeine Hives and Itching   • Adhesive Tape Rash     Social History     Socioeconomic History   • Marital status: Single     Spouse name: Not on file   • Number of children: Not on file   • Years of education: Not on file   • Highest education level: Not on file   Tobacco Use   • Smoking status: Current Some Day Smoker     Packs/day: 0.25     Years: 30.00     Pack years: 7.50     Types: Cigarettes   • Smokeless tobacco: Never Used   • Tobacco comment: 8-17-20 (\"DOWN TO ONE PER DAY\"   Substance and Sexual Activity   • Alcohol use: Not Currently   • Drug use: Not Currently   • Sexual activity: Defer       Review of Systems  Review of Systems   Constitutional: Negative for chills, fatigue, fever and unexpected weight change.   HENT: Negative for congestion, ear discharge, hearing loss, nosebleeds and sore throat.    Eyes: Negative for pain, discharge and redness.   " "  Respiratory: Negative for cough, chest tightness, shortness of breath and wheezing.    Cardiovascular: Negative for chest pain and palpitations.   Gastrointestinal: Positive for abdominal pain. Negative for abdominal distention, blood in stool, constipation, diarrhea, nausea and vomiting.   Endocrine: Negative for cold intolerance, polydipsia, polyphagia and polyuria.   Genitourinary: Negative for dysuria, flank pain, frequency, hematuria and urgency.   Musculoskeletal: Negative for arthralgias, back pain, joint swelling and myalgias.   Skin: Negative for color change, pallor and rash.   Neurological: Negative for tremors, seizures, syncope, weakness and headaches.   Hematological: Negative for adenopathy. Does not bruise/bleed easily.   Psychiatric/Behavioral: Negative for behavioral problems, confusion, dysphoric mood, hallucinations and suicidal ideas. The patient is not nervous/anxious.    Has GERD.     /79   Pulse 81   Ht 160 cm (63\")   Wt 56.3 kg (124 lb 3.2 oz)   BMI 22.00 kg/m²     Objective    Physical Exam  Constitutional:       Appearance: She is well-developed.   HENT:      Head: Normocephalic and atraumatic.   Eyes:      Conjunctiva/sclera: Conjunctivae normal.      Pupils: Pupils are equal, round, and reactive to light.   Neck:      Musculoskeletal: Normal range of motion and neck supple.      Thyroid: No thyromegaly.   Cardiovascular:      Rate and Rhythm: Normal rate and regular rhythm.      Heart sounds: Normal heart sounds. No murmur.   Pulmonary:      Effort: Pulmonary effort is normal.      Breath sounds: Normal breath sounds. No wheezing.   Abdominal:      General: Bowel sounds are normal. There is no distension.      Palpations: Abdomen is soft. There is no mass.      Tenderness: There is no abdominal tenderness.      Hernia: No hernia is present.   Genitourinary:     Comments: No lesions noted  Musculoskeletal: Normal range of motion.         General: No tenderness. "   Lymphadenopathy:      Cervical: No cervical adenopathy.   Skin:     General: Skin is warm and dry.      Findings: No rash.   Neurological:      Mental Status: She is alert and oriented to person, place, and time.      Cranial Nerves: No cranial nerve deficit.   Psychiatric:         Thought Content: Thought content normal.       Admission on 10/27/2020, Discharged on 10/27/2020   Component Date Value Ref Range Status   • Case Report 10/27/2020    Final                    Value:Surgical Pathology Report                         Case: [id number]                                  Authorizing Provider:  Carlos Enrique Alcantara MD      Collected:           10/27/2020 01:58 PM          Ordering Location:     [facility]             Received:            10/28/2020 07:41 AM                                 [place] [facility]                                                     Pathologist:           Jakob Franks MD                                                           Specimens:   1) - Gastric, Antrum                                                                                2) - Esophagus, Eso gastric junction                                                                3) - Large Intestine, Sigmoid Colon, polyp                                                • Final Diagnosis 10/27/2020    Final                    Value:This result contains rich text formatting which cannot be displayed here.     Assessment/Plan      1. Epigastric pain    1.  Epigastric pain, likely due to gastritis.  Could also be due to biliary dyskinesia.  Obtain HIDA scan.  Continue PPI.  Obtain stool H. pylori antigen for confirmation of completion of H. pylori therapy.  2.  GERD, with fair control.  Increase PPI to twice daily.  Continue antireflux lifestyle.  3.  Constipation, well controlled.  Continue Linzess and high-fiber diet.  4.  Chest pain, resolved.  Continue PPI and anteflexed lifestyle.  5.  Orr's esophagus, continue  PPI.  Repeat EGD in 1 year.  6.  Tobacco cessation, recommend cessation.  7.  Dysphagia, resolved.  Continue PPI.      Orders placed during this encounter include:  Orders Placed This Encounter   Procedures   • NM Hepatobiliary Without CCK       * Surgery not found *    Review and/or summary of lab tests, radiology, procedures, medications. Review and summary of old records and obtaining of history. The risks and benefits of my recommendations, as well as other treatment options were discussed with the patient today. Questions were answered.    New Medications Ordered This Visit   Medications   • sucralfate (Carafate) 1 g tablet     Sig: Take 1 tablet by mouth 4 (Four) Times a Day for 30 days.     Dispense:  120 tablet     Refill:  0       Follow-up: Return in about 1 month (around 1/18/2021).               Results for orders placed or performed during the hospital encounter of 10/27/20   Tissue Pathology Exam    Specimen: A: Gastric, Antrum; Tissue    B: Esophagus; Tissue    C: Large Intestine, Sigmoid Colon; Polyp   Result Value Ref Range    Case Report       Surgical Pathology Report                         Case: [id number]                                  Authorizing Provider:  Carlos Enrique Alcantara MD      Collected:           10/27/2020 01:58 PM          Ordering Location:     [facility]             Received:            10/28/2020 07:41 AM                                 [place] [facility]                                                     Pathologist:           Jakob Franks MD                                                           Specimens:   1) - Gastric, Antrum                                                                                2) - Esophagus, Eso gastric junction                                                                3) - Large Intestine, Sigmoid Colon, polyp                                                 Final Diagnosis       See Scanned Report       Results for orders  placed or performed in visit on 10/24/20   COVID [facility] Priority - Swab, Nasopharynx    Specimen: Nasopharynx; Swab   Result Value Ref Range    COVID [facility] PRIORITY Comment    COVID-19,[facility] ROUTINE, NP/OP SWAB IN TRANSPORT MEDIA OR ESWAB 72 HR TAT - Swab, Nasopharynx    Specimen: Nasopharynx; Swab   Result Value Ref Range    SARS-CoV-2, LEANNE Not Detected Not Detected   Results for orders placed or performed during the hospital encounter of 08/17/20   PREVIOUS HISTORY    Specimen: Blood   Result Value Ref Range    Previous History Previous Record on File    COVID [facility] Priority - Swab, Nasopharynx    Specimen: Nasopharynx; Swab   Result Value Ref Range    COVID [facility] PRIORITY Comment    COVID-19,[facility] ROUTINE, NP/OP SWAB IN TRANSPORT MEDIA OR ESWAB 72 HR TAT - Swab, Nasopharynx    Specimen: Nasopharynx; Swab   Result Value Ref Range    SARS-CoV-2, LEANNE Not Detected Not Detected   CBC Auto Differential    Specimen: Blood   Result Value Ref Range    WBC 9.18 3.40 - 10.80 10*3/mm3    RBC 3.32 (L) 3.77 - 5.28 10*6/mm3    Hemoglobin 11.0 (L) 12.0 - 15.9 g/dL    Hematocrit 31.3 (L) 34.0 - 46.6 %    MCV 94.3 79.0 - 97.0 fL    MCH 33.1 (H) 26.6 - 33.0 pg    MCHC 35.1 31.5 - 35.7 g/dL    RDW 13.6 12.3 - 15.4 %    RDW-SD 46.5 37.0 - 54.0 fl    MPV 8.5 6.0 - 12.0 fL    Platelets 278 140 - 450 10*3/mm3    Neutrophil % 67.0 42.7 - 76.0 %    Lymphocyte % 22.1 19.6 - 45.3 %    Monocyte % 10.1 5.0 - 12.0 %    Eosinophil % 0.3 0.3 - 6.2 %    Basophil % 0.1 0.0 - 1.5 %    Immature Grans % 0.4 0.0 - 0.5 %    Neutrophils, Absolute 6.14 1.70 - 7.00 10*3/mm3    Lymphocytes, Absolute 2.03 0.70 - 3.10 10*3/mm3    Monocytes, Absolute 0.93 (H) 0.10 - 0.90 10*3/mm3    Eosinophils, Absolute 0.03 0.00 - 0.40 10*3/mm3    Basophils, Absolute 0.01 0.00 - 0.20 10*3/mm3    Immature Grans, Absolute 0.04 0.00 - 0.05 10*3/mm3    nRBC 0.0 0.0 - 0.2 /100 WBC   Tissue Pathology Exam    Specimen: A: Lung, Left Lower Lobe; Tissue    B: Lymph Node;  Tissue    C: Lung, Left Lower Lobe; Tissue    D: Lymph Node; Tissue    E: Lymph Node; Tissue   Result Value Ref Range    Case Report       Surgical Pathology Report                         Case: [id number]                                  Authorizing Provider:  Horace Christiansen MD Collected:           08/20/2020 08:31 AM          Ordering Location:     [facility]             Received:            08/20/2020 08:47 AM                                 [place]                                                              Pathologist:           Jakob Franks MD                                                           Specimens:   1) - Lung, Left Lower Lobe, Left Lower Lobe                                                         2) - Lymph Node, Level 10 Lymph Node                                                                3) - Lung, Left Lower Lobe, Left Lower Lobe                                                         4) - Lymph Node, Level 8 Lymph Node                                                                 5) - Lymph Node, Level 6 Lylmph Node                                                       Final Diagnosis       SEE SCANNED REPORT       Type & Screen    Specimen: Blood   Result Value Ref Range    ABO Type O     RH type Positive     Antibody Screen Negative     T&S Expiration Date 8/23/2020 11:59:59 PM    Basic Metabolic Panel    Specimen: Blood   Result Value Ref Range    Glucose 93 65 - 99 mg/dL    BUN 6 6 - 20 mg/dL    Creatinine 0.67 0.57 - 1.00 mg/dL    Sodium 131 (L) 136 - 145 mmol/L    Potassium 4.2 3.5 - 5.2 mmol/L    Chloride 100 98 - 107 mmol/L    CO2 23.0 22.0 - 29.0 mmol/L    Calcium 7.9 (L) 8.6 - 10.5 mg/dL    eGFR Non African Amer 91 >60 mL/min/1.73    BUN/Creatinine Ratio 9.0 7.0 - 25.0    Anion Gap 8.0 5.0 - 15.0 mmol/L   Results for orders placed or performed in visit on 08/17/20   PREVIOUS HISTORY    Specimen: Blood   Result Value Ref Range    Previous History  Patient needs ABO/RH on day of surgery.    CBC (No Diff)    Specimen: Blood   Result Value Ref Range    WBC 6.94 3.40 - 10.80 10*3/mm3    RBC 3.85 3.77 - 5.28 10*6/mm3    Hemoglobin 12.6 12.0 - 15.9 g/dL    Hematocrit 35.4 34.0 - 46.6 %    MCV 91.9 79.0 - 97.0 fL    MCH 32.7 26.6 - 33.0 pg    MCHC 35.6 31.5 - 35.7 g/dL    RDW 13.2 12.3 - 15.4 %    RDW-SD 44.6 37.0 - 54.0 fl    MPV 8.2 6.0 - 12.0 fL    Platelets 319 140 - 450 10*3/mm3   Type & Screen    Specimen: Blood   Result Value Ref Range    ABO Type O     RH type Positive     Antibody Screen Negative     T&S Expiration Date 8/20/2020 11:59:59 PM          This document has been electronically signed by Carlos Enrique Alcantara MD on December 19, 2020 20:19 CST

## 2021-01-04 ENCOUNTER — HOSPITAL ENCOUNTER (OUTPATIENT)
Dept: NUCLEAR MEDICINE | Facility: HOSPITAL | Age: 56
Discharge: HOME | End: 2021-01-04

## 2021-01-04 PROCEDURE — 0 TECHNETIUM TC 99M MEBROFENIN KIT: Performed by: INTERNAL MEDICINE

## 2021-01-04 PROCEDURE — A9537 TC99M MEBROFENIN: HCPCS | Performed by: INTERNAL MEDICINE

## 2021-01-04 PROCEDURE — 78226 HEPATOBILIARY SYSTEM IMAGING: CPT

## 2021-01-04 RX ORDER — KIT FOR THE PREPARATION OF TECHNETIUM TC 99M MEBROFENIN 45 MG/10ML
1 INJECTION, POWDER, LYOPHILIZED, FOR SOLUTION INTRAVENOUS
Status: COMPLETED | OUTPATIENT
Start: 2021-01-04 | End: 2021-01-04

## 2021-01-04 RX ADMIN — MEBROFENIN 1 DOSE: 45 INJECTION, POWDER, LYOPHILIZED, FOR SOLUTION INTRAVENOUS at 07:33

## 2021-01-07 ENCOUNTER — APPOINTMENT (OUTPATIENT)
Dept: NUCLEAR MEDICINE | Facility: HOSPITAL | Age: 56
End: 2021-01-07

## 2021-01-11 ENCOUNTER — OFFICE VISIT (OUTPATIENT)
Dept: GASTROENTEROLOGY | Facility: CLINIC | Age: 56
End: 2021-01-11

## 2021-01-11 VITALS
WEIGHT: 125.6 LBS | SYSTOLIC BLOOD PRESSURE: 118 MMHG | HEIGHT: 63 IN | BODY MASS INDEX: 22.25 KG/M2 | DIASTOLIC BLOOD PRESSURE: 77 MMHG | HEART RATE: 71 BPM

## 2021-01-11 DIAGNOSIS — R10.13 EPIGASTRIC PAIN: Primary | ICD-10-CM

## 2021-01-11 PROCEDURE — 99214 OFFICE O/P EST MOD 30 MIN: CPT | Performed by: INTERNAL MEDICINE

## 2021-01-11 NOTE — PROGRESS NOTES
Chief Complaint   Patient presents with   • Abdominal Pain       Subjective    Alysia Deutsch is a 55 y.o. female.    History of Present Illness  Patient presented to GI clinic for follow-up visit today.  Has intermittent symptoms of epigastric pain.  Symptoms have improved largely.  Nausea and vomiting have resolved.  Bowel movements are fairly regular.  Denied rectal bleeding or weight loss.  HIDA scan was unremarkable.  Gallbladder ejection fraction was normal.  GERD is well controlled with PPI.  Denied dysphagia.       The following portions of the patient's history were reviewed and updated as appropriate:   Past Medical History:   Diagnosis Date   • Back pain    • Hepatitis    • Lung mass    • PONV (postoperative nausea and vomiting)      Past Surgical History:   Procedure Laterality Date   • BREAST AUGMENTATION     • COLONOSCOPY N/A 10/27/2020    Procedure: COLONOSCOPY;  Surgeon: Carlos Enrique Alcantara MD;  Location: [facility] ENDOSCOPY;  Service: Gastroenterology;  Laterality: N/A;   • ENDOSCOPY N/A 10/27/2020    Procedure: ESOPHAGOGASTRODUODENOSCOPY;  Surgeon: Carlos Enrique Alcantara MD;  Location: [facility] ENDOSCOPY;  Service: Gastroenterology;  Laterality: N/A;   • LAPAROSCOPIC TUBAL LIGATION     • THORACOSCOPY Left 8/20/2020    Procedure: LEFT THORACOSCOPY VIDEO ASSISTED THORACOTOMY pulmonary resection thoracic mediastinal lymphadenectomy bronchoscopy;  Surgeon: Horace Christiansen MD;  Location: [facility] OR;  Service: Cardiothoracic;  Laterality: Left;  Bronch [id number]  VATS [id number]     Family History   Problem Relation Age of Onset   • Diabetes Other    • Cancer Other    • No Known Problems Mother    • Heart disease Father    • Pancreatic cancer Father    • Colon cancer Maternal Grandfather      OB History    No obstetric history on file.       Prior to Admission medications    Medication Sig Start Date End Date Taking? Authorizing Provider   dexlansoprazole (Dexilant) 60 MG capsule Take 1 capsule by mouth  "Daily. 10/13/20  Yes Carlos Enrique Alcantara MD   escitalopram (LEXAPRO) 20 MG tablet Take 20 mg by mouth Daily. 6/23/20  Yes Debbie Menendez MD   fluticasone (FLONASE) 50 MCG/ACT nasal spray 1 spray into the nostril(s) as directed by provider Daily.   Yes Debbie Menendez MD   linaclotide (LINZESS) 290 MCG capsule capsule Take 1 capsule by mouth Every Morning Before Breakfast. 10/13/20  Yes Carlos Enrique Alcantara MD   nicotine ([employer] NICOTINE TRANSDERMAL SYS) 14 MG/24HR patch Place 1 patch on the skin as directed by provider Daily.   Yes Debbie Menendez MD   nicotine polacrilex (NICORETTE) 4 MG gum Chew 4 mg As Needed for Smoking Cessation.   Yes Debbie Menendez MD   pravastatin (PRAVACHOL) 40 MG tablet Take 40 mg by mouth Every Night.   Yes Debbie Menendez MD   sucralfate (Carafate) 1 g tablet Take 1 tablet by mouth 4 (Four) Times a Day for 30 days. 12/18/20 1/17/21 Yes Carlos Enrique Alcantara MD     Allergies   Allergen Reactions   • Codeine Hives and Itching   • Adhesive Tape Rash     Social History     Socioeconomic History   • Marital status: Single     Spouse name: Not on file   • Number of children: Not on file   • Years of education: Not on file   • Highest education level: Not on file   Tobacco Use   • Smoking status: Current Some Day Smoker     Packs/day: 0.25     Years: 30.00     Pack years: 7.50     Types: Cigarettes   • Smokeless tobacco: Never Used   • Tobacco comment: 8-17-20 (\"DOWN TO ONE PER DAY\"   Substance and Sexual Activity   • Alcohol use: Not Currently   • Drug use: Not Currently   • Sexual activity: Defer       Review of Systems  Review of Systems   Constitutional: Negative for chills, fatigue, fever and unexpected weight change.   HENT: Negative for congestion, ear discharge, hearing loss, nosebleeds and sore throat.    Eyes: Negative for pain, discharge and redness.   Respiratory: Negative for cough, chest tightness, shortness of breath and wheezing.    Cardiovascular: " "Negative for chest pain and palpitations.   Gastrointestinal: Positive for abdominal pain. Negative for abdominal distention, blood in stool, constipation, diarrhea, nausea and vomiting.   Endocrine: Negative for cold intolerance, polydipsia, polyphagia and polyuria.   Genitourinary: Negative for dysuria, flank pain, frequency, hematuria and urgency.   Musculoskeletal: Negative for arthralgias, back pain, joint swelling and myalgias.   Skin: Negative for color change, pallor and rash.   Neurological: Negative for tremors, seizures, syncope, weakness and headaches.   Hematological: Negative for adenopathy. Does not bruise/bleed easily.   Psychiatric/Behavioral: Negative for behavioral problems, confusion, dysphoric mood, hallucinations and suicidal ideas. The patient is not nervous/anxious.         /77   Pulse 71   Ht 160 cm (63\")   Wt 57 kg (125 lb 9.6 oz)   BMI 22.25 kg/m²     Objective    Physical Exam  Constitutional:       Appearance: She is well-developed.   HENT:      Head: Normocephalic and atraumatic.   Eyes:      Conjunctiva/sclera: Conjunctivae normal.      Pupils: Pupils are equal, round, and reactive to light.   Neck:      Musculoskeletal: Normal range of motion and neck supple.      Thyroid: No thyromegaly.   Cardiovascular:      Rate and Rhythm: Normal rate and regular rhythm.      Heart sounds: Normal heart sounds. No murmur.   Pulmonary:      Effort: Pulmonary effort is normal.      Breath sounds: Normal breath sounds. No wheezing.   Abdominal:      General: Bowel sounds are normal. There is no distension.      Palpations: Abdomen is soft. There is no mass.      Tenderness: There is no abdominal tenderness.      Hernia: No hernia is present.   Genitourinary:     Comments: No lesions noted  Musculoskeletal: Normal range of motion.         General: No tenderness.   Lymphadenopathy:      Cervical: No cervical adenopathy.   Skin:     General: Skin is warm and dry.      Findings: No rash. "   Neurological:      Mental Status: She is alert and oriented to person, place, and time.      Cranial Nerves: No cranial nerve deficit.   Psychiatric:         Thought Content: Thought content normal.       Admission on 10/27/2020, Discharged on 10/27/2020   Component Date Value Ref Range Status   • Case Report 10/27/2020    Final                    Value:Surgical Pathology Report                         Case: [id number]                                  Authorizing Provider:  Carlos Enrique Alcantara MD      Collected:           10/27/2020 01:58 PM          Ordering Location:     [facility]             Received:            10/28/2020 07:41 AM                                 [place] [facility]                                                     Pathologist:           Jakob Franks MD                                                           Specimens:   1) - Gastric, Antrum                                                                                2) - Esophagus, Eso gastric junction                                                                3) - Large Intestine, Sigmoid Colon, polyp                                                • Final Diagnosis 10/27/2020    Final                    Value:This result contains rich text formatting which cannot be displayed here.     Assessment/Plan      1. Epigastric pain    1.  Epigastric pain, likely due to gastritis.  Could also be due to alpha gal allergy.  obtain alpha gal panel.  Continue PPI.  Obtain stool H. pylori antigen for confirmation of completion of H. pylori therapy.  2.  GERD, well controlled.  Cont  PPI.  Continue antireflux lifestyle.  3.  Constipation, well controlled.  Continue Linzess and high-fiber diet.  4.  Chest pain, resolved.  Continue PPI and anteflexed lifestyle.  5.  Orr's esophagus, continue PPI.  Repeat EGD in 1 year.  6.  Tobacco usage, recommend cessation.  7.  Dysphagia, resolved.  Continue PPI.      Orders placed during this  encounter include:  Orders Placed This Encounter   Procedures   • Alpha - Gal Panel     Standing Status:   Future     Standing Expiration Date:   1/11/2022       * Surgery not found *    Review and/or summary of lab tests, radiology, procedures, medications. Review and summary of old records and obtaining of history. The risks and benefits of my recommendations, as well as other treatment options were discussed with the patient today. Questions were answered.    No orders of the defined types were placed in this encounter.      Follow-up: Return in about 1 month (around 2/11/2021).               Results for orders placed or performed during the hospital encounter of 10/27/20   Tissue Pathology Exam    Specimen: A: Gastric, Antrum; Tissue    B: Esophagus; Tissue    C: Large Intestine, Sigmoid Colon; Polyp   Result Value Ref Range    Case Report       Surgical Pathology Report                         Case: [id number]                                  Authorizing Provider:  Carlos Enrique Alcantara MD      Collected:           10/27/2020 01:58 PM          Ordering Location:     [facility]             Received:            10/28/2020 07:41 AM                                 [facility]                                                     Pathologist:           Jakob Franks MD                                                           Specimens:   1) - Gastric, Antrum                                                                                2) - Esophagus, Eso gastric junction                                                                3) - Large Intestine, Sigmoid Colon, polyp                                                 Final Diagnosis       See Scanned Report       Results for orders placed or performed in visit on 10/24/20   COVID [facility] Priority - Swab, Nasopharynx    Specimen: Nasopharynx; Swab   Result Value Ref Range    COVID [facility] PRIORITY Comment    COVID-19,[facility] ROUTINE, NP/OP SWAB  IN TRANSPORT MEDIA OR ESWAB 72 HR TAT - Swab, Nasopharynx    Specimen: Nasopharynx; Swab   Result Value Ref Range    SARS-CoV-2, LEANNE Not Detected Not Detected   Results for orders placed or performed during the hospital encounter of 08/17/20   PREVIOUS HISTORY    Specimen: Blood   Result Value Ref Range    Previous History Previous Record on File    COVID [facility] Priority - Swab, Nasopharynx    Specimen: Nasopharynx; Swab   Result Value Ref Range    COVID [facility] PRIORITY Comment    COVID-19,[facility] ROUTINE, NP/OP SWAB IN TRANSPORT MEDIA OR ESWAB 72 HR TAT - Swab, Nasopharynx    Specimen: Nasopharynx; Swab   Result Value Ref Range    SARS-CoV-2, LEANNE Not Detected Not Detected   CBC Auto Differential    Specimen: Blood   Result Value Ref Range    WBC 9.18 3.40 - 10.80 10*3/mm3    RBC 3.32 (L) 3.77 - 5.28 10*6/mm3    Hemoglobin 11.0 (L) 12.0 - 15.9 g/dL    Hematocrit 31.3 (L) 34.0 - 46.6 %    MCV 94.3 79.0 - 97.0 fL    MCH 33.1 (H) 26.6 - 33.0 pg    MCHC 35.1 31.5 - 35.7 g/dL    RDW 13.6 12.3 - 15.4 %    RDW-SD 46.5 37.0 - 54.0 fl    MPV 8.5 6.0 - 12.0 fL    Platelets 278 140 - 450 10*3/mm3    Neutrophil % 67.0 42.7 - 76.0 %    Lymphocyte % 22.1 19.6 - 45.3 %    Monocyte % 10.1 5.0 - 12.0 %    Eosinophil % 0.3 0.3 - 6.2 %    Basophil % 0.1 0.0 - 1.5 %    Immature Grans % 0.4 0.0 - 0.5 %    Neutrophils, Absolute 6.14 1.70 - 7.00 10*3/mm3    Lymphocytes, Absolute 2.03 0.70 - 3.10 10*3/mm3    Monocytes, Absolute 0.93 (H) 0.10 - 0.90 10*3/mm3    Eosinophils, Absolute 0.03 0.00 - 0.40 10*3/mm3    Basophils, Absolute 0.01 0.00 - 0.20 10*3/mm3    Immature Grans, Absolute 0.04 0.00 - 0.05 10*3/mm3    nRBC 0.0 0.0 - 0.2 /100 WBC   Tissue Pathology Exam    Specimen: A: Lung, Left Lower Lobe; Tissue    B: Lymph Node; Tissue    C: Lung, Left Lower Lobe; Tissue    D: Lymph Node; Tissue    E: Lymph Node; Tissue   Result Value Ref Range    Case Report       Surgical Pathology Report                         Case: [id number]                                   Authorizing Provider:  Horace Christiansen MD Collected:           08/20/2020 08:31 AM          Ordering Location:     [facility]             Received:            08/20/2020 08:47 AM                                 [place] [facility]                                                              Pathologist:           Jakob Franks MD                                                           Specimens:   1) - Lung, Left Lower Lobe, Left Lower Lobe                                                         2) - Lymph Node, Level 10 Lymph Node                                                                3) - Lung, Left Lower Lobe, Left Lower Lobe                                                         4) - Lymph Node, Level 8 Lymph Node                                                                 5) - Lymph Node, Level 6 Lylmph Node                                                       Final Diagnosis       SEE SCANNED REPORT       Type & Screen    Specimen: Blood   Result Value Ref Range    ABO Type O     RH type Positive     Antibody Screen Negative     T&S Expiration Date 8/23/2020 11:59:59 PM    Basic Metabolic Panel    Specimen: Blood   Result Value Ref Range    Glucose 93 65 - 99 mg/dL    BUN 6 6 - 20 mg/dL    Creatinine 0.67 0.57 - 1.00 mg/dL    Sodium 131 (L) 136 - 145 mmol/L    Potassium 4.2 3.5 - 5.2 mmol/L    Chloride 100 98 - 107 mmol/L    CO2 23.0 22.0 - 29.0 mmol/L    Calcium 7.9 (L) 8.6 - 10.5 mg/dL    eGFR Non African Amer 91 >60 mL/min/1.73    BUN/Creatinine Ratio 9.0 7.0 - 25.0    Anion Gap 8.0 5.0 - 15.0 mmol/L   Results for orders placed or performed in visit on 08/17/20   PREVIOUS HISTORY    Specimen: Blood   Result Value Ref Range    Previous History Patient needs ABO/RH on day of surgery.    CBC (No Diff)    Specimen: Blood   Result Value Ref Range    WBC 6.94 3.40 - 10.80 10*3/mm3    RBC 3.85 3.77 - 5.28 10*6/mm3    Hemoglobin 12.6 12.0 - 15.9 g/dL    Hematocrit  35.4 34.0 - 46.6 %    MCV 91.9 79.0 - 97.0 fL    MCH 32.7 26.6 - 33.0 pg    MCHC 35.6 31.5 - 35.7 g/dL    RDW 13.2 12.3 - 15.4 %    RDW-SD 44.6 37.0 - 54.0 fl    MPV 8.2 6.0 - 12.0 fL    Platelets 319 140 - 450 10*3/mm3   Type & Screen    Specimen: Blood   Result Value Ref Range    ABO Type O     RH type Positive     Antibody Screen Negative     T&S Expiration Date 8/20/2020 11:59:59 PM          This document has been electronically signed by Carlos Enrique Alcantara MD on January 11, 2021 15:51 CST

## 2021-01-11 NOTE — PATIENT INSTRUCTIONS
"BMI for Adults  What is BMI?  Body mass index (BMI) is a number that is calculated from a person's weight and height. BMI can help estimate how much of a person's weight is composed of fat. BMI does not measure body fat directly. Rather, it is an alternative to procedures that directly measure body fat, which can be difficult and expensive.  BMI can help identify people who may be at higher risk for certain medical problems.  What are BMI measurements used for?  BMI is used as a screening tool to identify possible weight problems. It helps determine whether a person is obese, overweight, a healthy weight, or underweight.  BMI is useful for:  · Identifying a weight problem that may be related to a medical condition or may increase the risk for medical problems.  · Promoting changes, such as changes in diet and exercise, to help reach a healthy weight. BMI screening can be repeated to see if these changes are working.  How is BMI calculated?  BMI involves measuring your weight in relation to your height. Both height and weight are measured, and the BMI is calculated from those numbers. This can be done either in English (U.S[place] or metric measurements. Note that charts and online BMI calculators are available to help you find your BMI quickly and easily without having to do these calculations yourself.  To calculate your BMI in English (U.S.) measurements:    1. Measure your weight in pounds (lb).  2. Multiply the number of pounds by 703.  ? For example, for a person who weighs 180 lb, multiply that number by 703, which equals 126,540.  3. Measure your height in inches. Then multiply that number by itself to get a measurement called \"inches squared.\"  ? For example, for a person who is 70 inches tall, the \"inches squared\" measurement is 70 inches x 70 inches, which equals 4,900 inches squared.  4. Divide the total from step 2 (number of lb x 703) by the total from step 3 (inches squared): 126,540 ÷ 4,900 = 25.8. This is " "your BMI.  To calculate your BMI in metric measurements:  1. Measure your weight in kilograms (kg).  2. Measure your height in meters (m). Then multiply that number by itself to get a measurement called \"meters squared.\"  ? For example, for a person who is 1.75 m tall, the \"meters squared\" measurement is 1.75 m x 1.75 m, which is equal to 3.1 meters squared.  3. Divide the number of kilograms (your weight) by the meters squared number. In this example: 70 ÷ 3.1 = 22.6. This is your BMI.  What do the results mean?  BMI charts are used to identify whether you are underweight, normal weight, overweight, or obese. The following guidelines will be used:  · Underweight: BMI less than 18.5.  · Normal weight: BMI between 18.5 and 24.9.  · Overweight: BMI between 25 and 29.9.  · Obese: BMI of 30 or above.  Keep these notes in mind:  · Weight includes both fat and muscle, so someone with a muscular build, such as an athlete, may have a BMI that is higher than 24.9. In cases like these, BMI is not an accurate measure of body fat.  · To determine if excess body fat is the cause of a BMI of 25 or higher, further assessments may need to be done by a health care provider.  · BMI is usually interpreted in the same way for men and women.  Where to find more information  For more information about BMI, including tools to quickly calculate your BMI, go to these websites:  · Centers for Disease Control and Prevention: www.cdc.gov  · American Heart Association: www.heart.org  · National Heart, Lung, and Blood [place]: www.nhlbi.nih.gov  Summary  · Body mass index (BMI) is a number that is calculated from a person's weight and height.  · BMI may help estimate how much of a person's weight is composed of fat. BMI can help identify those who may be at higher risk for certain medical problems.  · BMI can be measured using English measurements or metric measurements.  · BMI charts are used to identify whether you are underweight, normal " weight, overweight, or obese.  This information is not intended to replace advice given to you by your health care provider. Make sure you discuss any questions you have with your health care provider.  Document Revised: 09/09/2020 Document Reviewed: 07/17/2020  Elsevier Patient Education © 2020 [employer]

## 2021-01-20 ENCOUNTER — OFFICE VISIT (OUTPATIENT)
Dept: ORTHOPEDIC SURGERY | Facility: CLINIC | Age: 56
End: 2021-01-20

## 2021-01-20 VITALS — HEIGHT: 63 IN | WEIGHT: 122 LBS | BODY MASS INDEX: 21.62 KG/M2

## 2021-01-20 DIAGNOSIS — M54.50 LOW BACK PAIN RADIATING TO RIGHT LOWER EXTREMITY: ICD-10-CM

## 2021-01-20 DIAGNOSIS — M79.604 LOW BACK PAIN RADIATING TO RIGHT LOWER EXTREMITY: ICD-10-CM

## 2021-01-20 DIAGNOSIS — M51.36 LUMBAR DEGENERATIVE DISC DISEASE: ICD-10-CM

## 2021-01-20 DIAGNOSIS — M77.11 LATERAL EPICONDYLITIS, RIGHT ELBOW: ICD-10-CM

## 2021-01-20 DIAGNOSIS — M75.101 ROTATOR CUFF SYNDROME, RIGHT: ICD-10-CM

## 2021-01-20 DIAGNOSIS — M54.50 CHRONIC LOW BACK PAIN, UNSPECIFIED BACK PAIN LATERALITY, UNSPECIFIED WHETHER SCIATICA PRESENT: ICD-10-CM

## 2021-01-20 DIAGNOSIS — G89.29 CHRONIC LOW BACK PAIN, UNSPECIFIED BACK PAIN LATERALITY, UNSPECIFIED WHETHER SCIATICA PRESENT: ICD-10-CM

## 2021-01-20 DIAGNOSIS — M75.101 TEAR OF RIGHT ROTATOR CUFF, UNSPECIFIED TEAR EXTENT, UNSPECIFIED WHETHER TRAUMATIC: Primary | ICD-10-CM

## 2021-01-20 PROCEDURE — 99213 OFFICE O/P EST LOW 20 MIN: CPT | Performed by: ORTHOPAEDIC SURGERY

## 2021-01-20 NOTE — PROGRESS NOTES
"Alysia Deutsch is a 55 y.o. female returns for     Chief Complaint   Patient presents with   • Lumbar Spine - Follow-up       HISTORY OF PRESENT ILLNESS:  Patient in for follow up of lumbar spine pain and right shoulder pain.  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  She also has pain in her right elbow which is started is worse with activity and somewhat better with rest.     CONCURRENT MEDICAL HISTORY:    The following portions of the patient's history were reviewed and updated as appropriate: allergies, current medications, past family history, past medical history, past social history, past surgical history and problem list.     ROS  No fevers or chills.  No chest pain or shortness of air.  No GI or  disturbances.  No cardiac issues noted.  All other systems are reported negative or without change.    PHYSICAL EXAMINATION:       Ht 160 cm (63\")   Wt 55.3 kg (122 lb)   BMI 21.61 kg/m²     Physical Exam  Constitutional:       Appearance: She is well-developed.   HENT:      Head: Normocephalic and atraumatic.   Eyes:      Pupils: Pupils are equal, round, and reactive to light.   Neck:      Musculoskeletal: Neck supple.   Pulmonary:      Effort: Pulmonary effort is normal.   Musculoskeletal:         General: Swelling present.   Skin:     General: Skin is warm and dry.   Neurological:      Mental Status: She is alert and oriented to person, place, and time.         GAIT:     []  Normal  []  Antalgic    Assistive device: [x]  None  []  Walker     []  Crutches  []  Cane     []  Wheelchair  []  Stretcher    Ortho Exam  Tender over the sciatic notch on the right side pain with extension of her back.  Full motion hip without pain straight leg raising is negative.  Positive impingement of the right shoulder pain resistance of the cuff mild tenderness of the AC " joint on right side with mild prominence here.  Tender over the lateral epicondyle region of the elbow pain with resisted extension she is neurovascular intact distally.    Nm Hepatobiliary Without Cck    Result Date: 1/4/2021  Narrative: PROCEDURE: NM HEPATOBILIARY WITHOUT PHARM Clinical History: Abdominal pain, upper, chronic,?assess gallbladder motility, R10.13 Epigastric pain Indication: Same as above Comparison: There are no comparable prior examinations available at present. Technique: 6.6 Millicuries of technetium Choletec was given intravenously and the patient was imaged for the subsequent 60 minutes after radiopharmaceutical injection. At the end of the initial scanning, the patient was given 7 ounces of corn oil emulsion and imaged again. Findings: The liver shows homogenous tracer uptake. There is no intrahepatic biliary dilatation. Activity seen in the common duct, small bowel loops in the gallbladder during the initial phase imaging At the end of the initial scanning CCK injection was given intravenously. There is no documentation of any subjective symptoms experienced by the patient following ingestion of corn oil emulsion. The gallbladder ejection fraction is 74 percent.     Impression: Impression: 1. There is no scintigraphic evidence of acute cholecystitis. 2. The gallbladder ejection fraction is normal at 74 percent. 3. There is no documentation of any subjective symptoms experienced by the patient following ingestion of corn oil emulsion. Electronically signed by:  Sukhwinder Major MD  1/4/2021 1:56 PM CST Workstation: "[email or web address]-            ASSESSMENT:    Diagnoses and all orders for this visit:    Tear of right rotator cuff, unspecified tear extent, unspecified whether traumatic  -     MRI Shoulder Right Without Contrast; Future    Low back pain radiating to right lower extremity  -     Cancel: Ambulatory Referral to Neurosurgery  -     Ambulatory Referral to Neurosurgery    Chronic low back  pain, unspecified back pain laterality, unspecified whether sciatica present  -     Cancel: Ambulatory Referral to Neurosurgery  -     Ambulatory Referral to Neurosurgery    Lumbar degenerative disc disease  -     Cancel: Ambulatory Referral to Neurosurgery  -     Ambulatory Referral to Neurosurgery    Rotator cuff syndrome, right    Lateral epicondylitis, right elbow          PLAN I think with regard to her shoulder we need to repeat get MRI scan at this point.  She has been injected with doing her exercises.  She will try tennis elbow brace for the elbow to see if this helps I think this is probably her secondary problem.  We also discussed injecting the elbow which we will hold off at this point.  Her back pain has been persistent and let her get an opinion from the spine guys.  I do not think there is anything operative to do here at this point but she still hurting I also suggested a corset that she try to see if this helps her any and will see her back after MRI scan of her shoulder.    No follow-ups on file.        This document has been electronically signed by Asim Campbell MD on January 20, 2021 15:48 CST

## 2021-01-29 ENCOUNTER — HOSPITAL ENCOUNTER (OUTPATIENT)
Dept: MRI IMAGING | Facility: HOSPITAL | Age: 56
Discharge: HOME | End: 2021-01-29
Admitting: ORTHOPAEDIC SURGERY

## 2021-01-29 DIAGNOSIS — M75.101 TEAR OF RIGHT ROTATOR CUFF, UNSPECIFIED TEAR EXTENT, UNSPECIFIED WHETHER TRAUMATIC: ICD-10-CM

## 2021-01-29 PROCEDURE — 73221 MRI JOINT UPR EXTREM W/O DYE: CPT

## 2021-02-03 ENCOUNTER — OFFICE VISIT (OUTPATIENT)
Dept: ORTHOPEDIC SURGERY | Facility: CLINIC | Age: 56
End: 2021-02-03

## 2021-02-03 VITALS — HEIGHT: 63 IN | BODY MASS INDEX: 21.74 KG/M2 | WEIGHT: 122.7 LBS

## 2021-02-03 DIAGNOSIS — M75.101 ROTATOR CUFF SYNDROME, RIGHT: Primary | ICD-10-CM

## 2021-02-03 PROCEDURE — 20610 DRAIN/INJ JOINT/BURSA W/O US: CPT | Performed by: ORTHOPAEDIC SURGERY

## 2021-02-03 RX ORDER — TEMAZEPAM 15 MG/1
15-30 CAPSULE ORAL NIGHTLY
COMMUNITY
Start: 2021-01-04

## 2021-02-03 RX ORDER — LIDOCAINE HYDROCHLORIDE 10 MG/ML
4 INJECTION, SOLUTION EPIDURAL; INFILTRATION; INTRACAUDAL; PERINEURAL
Status: COMPLETED | OUTPATIENT
Start: 2021-02-03 | End: 2021-02-03

## 2021-02-03 RX ORDER — METHYLPREDNISOLONE ACETATE 40 MG/ML
80 INJECTION, SUSPENSION INTRA-ARTICULAR; INTRALESIONAL; INTRAMUSCULAR; SOFT TISSUE
Status: COMPLETED | OUTPATIENT
Start: 2021-02-03 | End: 2021-02-03

## 2021-02-03 RX ADMIN — METHYLPREDNISOLONE ACETATE 80 MG: 40 INJECTION, SUSPENSION INTRA-ARTICULAR; INTRALESIONAL; INTRAMUSCULAR; SOFT TISSUE at 09:31

## 2021-02-03 RX ADMIN — LIDOCAINE HYDROCHLORIDE 4 ML: 10 INJECTION, SOLUTION EPIDURAL; INFILTRATION; INTRACAUDAL; PERINEURAL at 09:31

## 2021-02-03 NOTE — PROGRESS NOTES
Alysia Deutsch is a 55 y.o. female returns for     Chief Complaint   Patient presents with   • Right Shoulder - Pain       HISTORY OF PRESENT ILLNESS: Patient is here today for MRI results of right shoulder. Patient states that her pain is 5/10.  Hurts with activity better with rest       CONCURRENT MEDICAL HISTORY:    Past Medical History:   Diagnosis Date   • Back pain    • Hepatitis    • Lung mass    • PONV (postoperative nausea and vomiting)        Allergies   Allergen Reactions   • Codeine Hives and Itching   • Adhesive Tape Rash         Current Outpatient Medications:   •  escitalopram (LEXAPRO) 20 MG tablet, Take 20 mg by mouth Daily., Disp: , Rfl:   •  fluticasone (FLONASE) 50 MCG/ACT nasal spray, 1 spray into the nostril(s) as directed by provider Daily., Disp: , Rfl:   •  linaclotide (LINZESS) 290 MCG capsule capsule, Take 1 capsule by mouth Every Morning Before Breakfast., Disp: 30 capsule, Rfl: 5  •  nicotine ([employer] NICOTINE TRANSDERMAL SYS) 14 MG/24HR patch, Place 1 patch on the skin as directed by provider Daily., Disp: , Rfl:   •  nicotine polacrilex (NICORETTE) 4 MG gum, Chew 4 mg As Needed for Smoking Cessation., Disp: , Rfl:   •  pravastatin (PRAVACHOL) 40 MG tablet, Take 40 mg by mouth Every Night., Disp: , Rfl:   •  temazepam (RESTORIL) 15 MG capsule, Take 15-30 mg by mouth Every Night., Disp: , Rfl:     Past Surgical History:   Procedure Laterality Date   • BREAST AUGMENTATION     • COLONOSCOPY N/A 10/27/2020    Procedure: COLONOSCOPY;  Surgeon: Carlos Enrique Alcantara MD;  Location: [facility] ENDOSCOPY;  Service: Gastroenterology;  Laterality: N/A;   • ENDOSCOPY N/A 10/27/2020    Procedure: ESOPHAGOGASTRODUODENOSCOPY;  Surgeon: Carlos Enrique Alcantara MD;  Location: [facility] ENDOSCOPY;  Service: Gastroenterology;  Laterality: N/A;   • LAPAROSCOPIC TUBAL LIGATION     • THORACOSCOPY Left 8/20/2020    Procedure: LEFT THORACOSCOPY VIDEO ASSISTED THORACOTOMY pulmonary resection thoracic mediastinal  "lymphadenectomy bronchoscopy;  Surgeon: Horace Christiansen MD;  Location: [facility];  Service: Cardiothoracic;  Laterality: Left;  Bronch [id number]  VATS [phone]           ROS: Review of systems has been updated as of today's date.  All other systems are negative except as noted previously.    PHYSICAL EXAMINATION:       Ht 160 cm (63\")   Wt 55.7 kg (122 lb 11.2 oz)   BMI 21.74 kg/m²     Physical Exam  Constitutional:       Appearance: She is well-developed.   HENT:      Head: Normocephalic and atraumatic.   Eyes:      Pupils: Pupils are equal, round, and reactive to light.   Neck:      Musculoskeletal: Neck supple.   Pulmonary:      Effort: Pulmonary effort is normal.   Musculoskeletal:         General: Swelling and tenderness present.   Skin:     General: Skin is warm and dry.   Neurological:      Mental Status: She is alert and oriented to person, place, and time.         GAIT:     []  Normal  []  Antalgic    Assistive device: [x]  None  []  Walker     []  Crutches  []  Cane     []  Wheelchair  []  Stretcher    Ortho Exam  Positive impingement.  Pain resistance of the supraspinatus and infraspinatus.  Mild tenderness over the AC joint with mild prominence on the right side.    Nm Hepatobiliary Without Cck    Result Date: 1/4/2021  Narrative: PROCEDURE: NM HEPATOBILIARY WITHOUT PHARM Clinical History: Abdominal pain, upper, chronic,?assess gallbladder motility, R10.13 Epigastric pain Indication: Same as above Comparison: There are no comparable prior examinations available at present. Technique: 6.6 Millicuries of technetium Choletec was given intravenously and the patient was imaged for the subsequent 60 minutes after radiopharmaceutical injection. At the end of the initial scanning, the patient was given 7 ounces of corn oil emulsion and imaged again. Findings: The liver shows homogenous tracer uptake. There is no intrahepatic biliary dilatation. Activity seen in the common duct, small bowel loops in " the gallbladder during the initial phase imaging At the end of the initial scanning CCK injection was given intravenously. There is no documentation of any subjective symptoms experienced by the patient following ingestion of corn oil emulsion. The gallbladder ejection fraction is 74 percent.     Impression: Impression: 1. There is no scintigraphic evidence of acute cholecystitis. 2. The gallbladder ejection fraction is normal at 74 percent. 3. There is no documentation of any subjective symptoms experienced by the patient following ingestion of corn oil emulsion. Electronically signed by:  Sukhwinder Major MD  1/4/2021 1:56 PM [facility] Workstation: [email or web address]    Mri Shoulder Right Without Contrast    Result Date: 1/29/2021  Narrative: MRI right shoulder HISTORY: Shoulder pain rotator cuff disorder. TECHNIQUE: Multiplanar multisequence noncontrast images right shoulder. There is acromioclavicular joint arthrosis with capsular hypertrophy and osteophyte formation causing underlying impingement (series 4 image 12. Series 7 image 10). Normal appearing glenoid clare. Normal biceps tendon. There is thickening and increased signal intensity subscapularis tendon suggesting chronic interstitial partial-thickness tear and/or tendinosis. Partial-thickness bursal surface tear anterior aspect of infraspinatus tendon 0.98 cm in its greatest AP diameter, 0.8 cm in its greatest lateral medial diameter. Series 4 image 10. Series 7 image eight. No evidence of a full-thickness tear. Normal supraspinatous. There are subchondral arthritic cystic changes posterior lateral aspect humeral head.     Impression: Acromioclavicular joint arthrosis causing significant underlying impingement. Thickening and heterogeneity subscapularis tendon, partial-thickness interstitial tear and/or tendinosis. Partial-thickness bursal surface tear anterior aspect of the infraspinatus tendon. No full-thickness tear. MRI right shoulder is otherwise unremarkable.  Electronically signed by:  Reuben Larose MD  1/29/2021 4:00 PM CST Workstation: [id number]      Reviewed this MRI scan agree with this finding    Large Joint Arthrocentesis: L subacromial bursa  Date/Time: 2/3/2021 9:31 AM  Consent given by: patient  Site marked: site marked  Timeout: Immediately prior to procedure a time out was called to verify the correct patient, procedure, equipment, support staff and site/side marked as required   Supporting Documentation  Indications: pain   Procedure Details  Location: shoulder - L subacromial bursa  Preparation: Patient was prepped and draped in the usual sterile fashion  Needle size: 22 G  Approach: posterior  Medications administered: 4 mL lidocaine PF 1% 1 %; 80 mg methylPREDNISolone acetate 40 MG/ML  Patient tolerance: patient tolerated the procedure well with no immediate complications        ASSESSMENT:    Diagnoses and all orders for this visit:    Rotator cuff syndrome, right    Other orders  -     temazepam (RESTORIL) 15 MG capsule; Take 15-30 mg by mouth Every Night.  -     Large Joint Arthrocentesis          PLAN I think he has an impingement that is certainly been exacerbated.  We discussed about options at this point.  We have elected to go ahead and inject her again we will switch the steroid subacromially to Depo-Medrol she will ice it down continue her exercises she is already been to physical therapy.  Is a 10 does not want improvement she may need cervical decompression I discussed that with her briefly she will call and let us know if she has any issues.  Patient's Body mass index is 21.74 kg/m². BMI is within normal parameters. No follow-up required..  No follow-ups on file.      This document has been electronically signed by Radha Corado CSA on February 3, 2021 09:32 CST

## 2021-02-10 ENCOUNTER — HOSPITAL ENCOUNTER (OUTPATIENT)
Dept: CT IMAGING | Facility: HOSPITAL | Age: 56
Discharge: HOME | End: 2021-02-10
Admitting: NURSE PRACTITIONER

## 2021-02-10 DIAGNOSIS — R91.1 LUNG NODULE: ICD-10-CM

## 2021-02-10 PROCEDURE — 71250 CT THORAX DX C-: CPT

## 2021-02-25 RX ORDER — DICYCLOMINE HCL 20 MG
20 TABLET ORAL EVERY 6 HOURS
Qty: 360 TABLET | Refills: 4 | Status: SHIPPED | OUTPATIENT
Start: 2021-02-25 | End: 2021-03-27

## 2021-03-18 ENCOUNTER — OFFICE VISIT (OUTPATIENT)
Dept: CARDIAC SURGERY | Facility: CLINIC | Age: 56
End: 2021-03-18

## 2021-03-18 VITALS
OXYGEN SATURATION: 98 % | HEART RATE: 73 BPM | SYSTOLIC BLOOD PRESSURE: 110 MMHG | WEIGHT: 123.2 LBS | TEMPERATURE: 97.8 F | HEIGHT: 63 IN | BODY MASS INDEX: 21.83 KG/M2 | DIASTOLIC BLOOD PRESSURE: 70 MMHG

## 2021-03-18 DIAGNOSIS — J43.1 PANLOBULAR EMPHYSEMA (HCC): ICD-10-CM

## 2021-03-18 DIAGNOSIS — E78.2 MIXED HYPERLIPIDEMIA: ICD-10-CM

## 2021-03-18 DIAGNOSIS — I10 BENIGN ESSENTIAL HTN: ICD-10-CM

## 2021-03-18 DIAGNOSIS — C34.92 ADENOCARCINOMA OF LEFT LUNG (HCC): Primary | ICD-10-CM

## 2021-03-18 DIAGNOSIS — F17.218 NICOTINE DEPENDENCE, CIGARETTES, WITH OTHER NICOTINE-INDUCED DISORDERS: ICD-10-CM

## 2021-03-18 PROCEDURE — 99214 OFFICE O/P EST MOD 30 MIN: CPT | Performed by: THORACIC SURGERY (CARDIOTHORACIC VASCULAR SURGERY)

## 2021-03-26 ENCOUNTER — IMMUNIZATION (OUTPATIENT)
Dept: VACCINE CLINIC | Facility: HOSPITAL | Age: 56
End: 2021-03-26

## 2021-03-26 PROCEDURE — 91300 HC SARSCOV02 VAC 30MCG/0.3ML IM: CPT | Performed by: NURSE PRACTITIONER

## 2021-03-26 PROCEDURE — 0001A: CPT | Performed by: NURSE PRACTITIONER

## 2021-04-09 PROCEDURE — 87635 SARS-COV-2 COVID-19 AMP PRB: CPT | Performed by: NURSE PRACTITIONER

## 2021-04-16 ENCOUNTER — IMMUNIZATION (OUTPATIENT)
Dept: VACCINE CLINIC | Facility: HOSPITAL | Age: 56
End: 2021-04-16

## 2021-04-16 PROCEDURE — 0002A: CPT | Performed by: THORACIC SURGERY (CARDIOTHORACIC VASCULAR SURGERY)

## 2021-04-16 PROCEDURE — 91300 HC SARSCOV02 VAC 30MCG/0.3ML IM: CPT | Performed by: THORACIC SURGERY (CARDIOTHORACIC VASCULAR SURGERY)

## 2021-04-30 PROBLEM — Z48.813 SURGICAL AFTERCARE, RESPIRATORY SYSTEM: Status: RESOLVED | Noted: 2020-10-02 | Resolved: 2021-04-30

## 2021-04-30 PROBLEM — F17.218 NICOTINE DEPENDENCE, CIGARETTES, WITH OTHER NICOTINE-INDUCED DISORDERS: Status: ACTIVE | Noted: 2021-04-30

## 2021-04-30 NOTE — PROGRESS NOTES
3/18/2021    Alysia Deutsch  1965    Chief Complaint:  Lung cancer    HPI:      PCP:  Rosario Mckeon MD  GI:  Dr Alcantara  Ortho: Dr. Gaines     55 y.o. female with HTN(stable, increased risk stroke, rupture), Hyperlipidemia(stable, increase risk cardiovascular events), COPD(chronic progression, increase risk pulmonary complications) and Smoker(uncontrolled, increased risk cardiovascular events) insomnia,  pulmonary nodule (new, suspicious malignancy).  smokes 0.25 PPD.  asymptomatic LLL pulmonary nodule.  No TIA stroke amaurosis.  No MI claudication.   No other associated signs, symptoms or modifying factors.     6/2020 CT Brain:  No acute findings.  6/2020 CT Chest: 20mm nodule LEFT lower lobe.  No significant adenopathy.  Liver, adrenals ok.  8/2020 PET CT: LLL nodule 22mm (SUV 5.4)  8/2020 PFT:  FVC 2.9 (136%), FEV1 1.8 (103%), DLCO 54%  8/2020 L VATS Left lower lobectomy  8/2020 Surgical Pathology: Adenocarcinoma, (A1ND0R2)  2/2021 CT Chest: no evidence of recurrence.  Liver adrenals ok.    The following portions of the patient's history were reviewed and updated as appropriate: allergies, current medications, past family history, past medical history, past social history, past surgical history and problem list.  Recent images independently reviewed.  Available laboratory values reviewed.    PMH:  Past Medical History:   Diagnosis Date   • Back pain    • Hepatitis    • Lung mass    • PONV (postoperative nausea and vomiting)      Past Surgical History:   Procedure Laterality Date   • BREAST AUGMENTATION     • COLONOSCOPY N/A 10/27/2020    Procedure: COLONOSCOPY;  Surgeon: Carlos Enrique Alcantara MD;  Location: [facility] ENDOSCOPY;  Service: Gastroenterology;  Laterality: N/A;   • ENDOSCOPY N/A 10/27/2020    Procedure: ESOPHAGOGASTRODUODENOSCOPY;  Surgeon: Carlos Enrique Alcantara MD;  Location: [facility] ENDOSCOPY;  Service: Gastroenterology;  Laterality: N/A;   • LAPAROSCOPIC TUBAL LIGATION     • THORACOSCOPY  "Left 8/20/2020    Procedure: LEFT THORACOSCOPY VIDEO ASSISTED THORACOTOMY pulmonary resection thoracic mediastinal lymphadenectomy bronchoscopy;  Surgeon: Horace Christiansen MD;  Location: [facility];  Service: Cardiothoracic;  Laterality: Left;  Bronch [phone]  VATS [phone]     Family History   Problem Relation Age of Onset   • Diabetes Other    • Cancer Other    • No Known Problems Mother    • Heart disease Father    • Pancreatic cancer Father    • Colon cancer Maternal Grandfather      Social History     Tobacco Use   • Smoking status: Current Some Day Smoker     Packs/day: 0.25     Years: 30.00     Pack years: 7.50     Types: Cigarettes   • Smokeless tobacco: Never Used   • Tobacco comment: 8-17-20 (\"DOWN TO ONE PER DAY\"   Substance Use Topics   • Alcohol use: Not Currently   • Drug use: Not Currently       ALLERGIES:  Allergies   Allergen Reactions   • Codeine Hives and Itching   • Adhesive Tape Rash         MEDICATIONS:    Current Outpatient Medications:   •  escitalopram (LEXAPRO) 20 MG tablet, Take 20 mg by mouth Daily., Disp: , Rfl:   •  fluticasone (FLONASE) 50 MCG/ACT nasal spray, 1 spray into the nostril(s) as directed by provider Daily., Disp: , Rfl:   •  nicotine ([employer] NICOTINE TRANSDERMAL SYS) 14 MG/24HR patch, Place 1 patch on the skin as directed by provider Daily., Disp: , Rfl:   •  nicotine polacrilex (NICORETTE) 4 MG gum, Chew 4 mg As Needed for Smoking Cessation., Disp: , Rfl:   •  pravastatin (PRAVACHOL) 40 MG tablet, Take 40 mg by mouth Every Night., Disp: , Rfl:   •  temazepam (RESTORIL) 15 MG capsule, Take 15-30 mg by mouth Every Night., Disp: , Rfl:     Review of Systems   Review of Systems   Constitutional: Positive for malaise/fatigue. Negative for weight loss.   Cardiovascular: Positive for dyspnea on exertion. Negative for chest pain and claudication.   Respiratory: Negative for cough and shortness of breath.    Skin: Negative for color change and poor wound healing. " "  Musculoskeletal: Positive for arthritis, joint pain, muscle weakness and neck pain.   Neurological: Negative for dizziness, numbness and weakness.       Physical Exam   Vitals:    03/18/21 1531   BP: 110/70   BP Location: Left arm   Pulse: 73   Temp: 97.8 °F (36.6 °C)   TempSrc: Infrared   SpO2: 98%   Weight: 55.9 kg (123 lb 3.2 oz)   Height: 160 cm (63\")     Physical Exam  Constitutional:       General: She is not in acute distress.     Appearance: She is not ill-appearing.   HENT:      Right Ear: Hearing normal.      Left Ear: Hearing normal.      Nose: No nasal deformity.      Mouth/Throat:      Dentition: Normal dentition. Does not have dentures.   Cardiovascular:      Rate and Rhythm: Normal rate and regular rhythm.      Pulses:           Carotid pulses are 2+ on the right side and 2+ on the left side.       Radial pulses are 2+ on the right side and 2+ on the left side.        Posterior tibial pulses are 2+ on the right side and 2+ on the left side.      Heart sounds: No murmur heard.     Pulmonary:      Effort: Pulmonary effort is normal.      Breath sounds: Normal breath sounds.   Abdominal:      General: There is no distension.      Palpations: Abdomen is soft. There is no mass.      Tenderness: There is no abdominal tenderness.   Musculoskeletal:         General: No deformity.      Comments: Gait normal.    Skin:     General: Skin is warm and dry.      Coloration: Skin is not pale.      Findings: No erythema.      Comments: No venous staining   Neurological:      Mental Status: She is alert and oriented to person, place, and time.   Psychiatric:         Speech: Speech normal.         Behavior: Behavior is cooperative.         Thought Content: Thought content normal.         Judgment: Judgment normal.         BUN   Date Value Ref Range Status   08/21/2020 6 6 - 20 mg/dL Final     Creatinine   Date Value Ref Range Status   08/21/2020 0.67 0.57 - 1.00 mg/dL Final     eGFR Non  Amer   Date Value Ref " Range Status   08/21/2020 91 >60 mL/min/1.73 Final       ASSESSMENT:  Diagnoses and all orders for this visit:    1. Adenocarcinoma of left lung (CMS/HCC) (Primary)  -     CT Chest Without Contrast Diagnostic; Future    2. Panlobular emphysema (CMS/HCC)    3. Mixed hyperlipidemia    4. Benign essential HTN    5. Nicotine dependence, cigarettes, with other nicotine-induced disorders    PLAN:  Detailed discussion with Alysia Deutsch regarding situation and options.  Stable lung cancer.  Multiple risk factors with severe comorbidities.  No intervention indicated at this time.  Will follow with interval imaging.  Risks, benefits discussed.  Understands and wishes to proceed with plan.    Return in 6 months with CT Chest    Return after above studies complete  Smoking cessation advised and assistance options offered including behavioral counseling ([place] [facility]essation [facility]), Nicotine replacement therapy (patches or gum), pharmacologic therapy (Chantix, Wellbutrin). patient understands that continued use of tobacco products increases risk of heart disease, stroke, cancer; counseling for 3-5min.    Recommended regular physical activity, progressive walking program.  Continue current medications as directed.    Thank you for the opportunity to participate in this patient's care.    Copy to primary care provider.

## 2021-06-02 ENCOUNTER — TRANSCRIBE ORDERS (OUTPATIENT)
Dept: CARDIOLOGY | Facility: CLINIC | Age: 56
End: 2021-06-02

## 2021-06-02 ENCOUNTER — TRANSCRIBE ORDERS (OUTPATIENT)
Dept: LAB | Facility: HOSPITAL | Age: 56
End: 2021-06-02

## 2021-06-02 ENCOUNTER — LAB (OUTPATIENT)
Dept: LAB | Facility: HOSPITAL | Age: 56
End: 2021-06-02

## 2021-06-02 DIAGNOSIS — Z51.81 ADMISSION FOR LONG-TERM (CURRENT) USE OF ANTICOAGULANTS: Primary | ICD-10-CM

## 2021-06-02 DIAGNOSIS — R53.83 TIREDNESS: Primary | ICD-10-CM

## 2021-06-02 DIAGNOSIS — Z79.01 LONG TERM (CURRENT) USE OF ANTICOAGULANTS: ICD-10-CM

## 2021-06-02 DIAGNOSIS — Z79.01 ADMISSION FOR LONG-TERM (CURRENT) USE OF ANTICOAGULANTS: Primary | ICD-10-CM

## 2021-06-02 DIAGNOSIS — M51.26 DISPLACEMENT OF LUMBAR INTERVERTEBRAL DISC WITHOUT MYELOPATHY: ICD-10-CM

## 2021-06-02 DIAGNOSIS — R53.83 TIREDNESS: ICD-10-CM

## 2021-06-02 LAB — APTT PPP: 32.3 SECONDS (ref 20–40.3)

## 2021-06-02 PROCEDURE — 93000 ELECTROCARDIOGRAM COMPLETE: CPT | Performed by: INTERNAL MEDICINE

## 2021-06-02 PROCEDURE — 85246 CLOT FACTOR VIII VW ANTIGEN: CPT

## 2021-06-02 PROCEDURE — 85610 PROTHROMBIN TIME: CPT

## 2021-06-02 PROCEDURE — 85025 COMPLETE CBC W/AUTO DIFF WBC: CPT

## 2021-06-02 PROCEDURE — 85730 THROMBOPLASTIN TIME PARTIAL: CPT

## 2021-06-02 PROCEDURE — 85245 CLOT FACTOR VIII VW RISTOCTN: CPT

## 2021-06-02 PROCEDURE — 85611 PROTHROMBIN TEST: CPT

## 2021-06-02 PROCEDURE — 85652 RBC SED RATE AUTOMATED: CPT

## 2021-06-02 PROCEDURE — 85240 CLOT FACTOR VIII AHG 1 STAGE: CPT

## 2021-06-02 PROCEDURE — 36415 COLL VENOUS BLD VENIPUNCTURE: CPT

## 2021-06-03 LAB
BASOPHILS # BLD AUTO: 0.04 10*3/MM3 (ref 0–0.2)
BASOPHILS NFR BLD AUTO: 0.5 % (ref 0–1.5)
DEPRECATED RDW RBC AUTO: 45.4 FL (ref 37–54)
EOSINOPHIL # BLD AUTO: 0.11 10*3/MM3 (ref 0–0.4)
EOSINOPHIL NFR BLD AUTO: 1.3 % (ref 0.3–6.2)
ERYTHROCYTE [DISTWIDTH] IN BLOOD BY AUTOMATED COUNT: 13.3 % (ref 12.3–15.4)
ERYTHROCYTE [SEDIMENTATION RATE] IN BLOOD: 12 MM/HR (ref 0–30)
HCT VFR BLD AUTO: 37 % (ref 34–46.6)
HGB BLD-MCNC: 13.3 G/DL (ref 12–15.9)
IMM GRANULOCYTES # BLD AUTO: 0.05 10*3/MM3 (ref 0–0.05)
IMM GRANULOCYTES NFR BLD AUTO: 0.6 % (ref 0–0.5)
LYMPHOCYTES # BLD AUTO: 1.88 10*3/MM3 (ref 0.7–3.1)
LYMPHOCYTES NFR BLD AUTO: 22.5 % (ref 19.6–45.3)
MCH RBC QN AUTO: 33 PG (ref 26.6–33)
MCHC RBC AUTO-ENTMCNC: 35.9 G/DL (ref 31.5–35.7)
MCV RBC AUTO: 91.8 FL (ref 79–97)
MONOCYTES # BLD AUTO: 0.81 10*3/MM3 (ref 0.1–0.9)
MONOCYTES NFR BLD AUTO: 9.7 % (ref 5–12)
NEUTROPHILS NFR BLD AUTO: 5.46 10*3/MM3 (ref 1.7–7)
NEUTROPHILS NFR BLD AUTO: 65.4 % (ref 42.7–76)
NRBC BLD AUTO-RTO: 0 /100 WBC (ref 0–0.2)
PLATELET # BLD AUTO: 299 10*3/MM3 (ref 140–450)
PMV BLD AUTO: 8.9 FL (ref 6–12)
RBC # BLD AUTO: 4.03 10*6/MM3 (ref 3.77–5.28)
WBC # BLD AUTO: 8.35 10*3/MM3 (ref 3.4–10.8)

## 2021-06-05 LAB
FACT VIII ACT/NOR PPP: 117 % (ref 56–140)
PROTHROMBIN TIME: 10.2 SEC (ref 9.1–12)
PT 1H NP PPP: NORMAL S
PT IMM NP PPP: 10.4 SEC (ref 9.1–12)
QT INTERVAL: 378 MS
QTC INTERVAL: 396 MS
VWF AG ACT/NOR PPP IA: 106 % (ref 50–200)
VWF:RCO ACT/NOR PPP PL AGG: 105 % (ref 50–200)

## 2021-08-02 ENCOUNTER — HOSPITAL ENCOUNTER (OUTPATIENT)
Dept: CT IMAGING | Facility: HOSPITAL | Age: 56
Discharge: HOME | End: 2021-08-02
Admitting: THORACIC SURGERY (CARDIOTHORACIC VASCULAR SURGERY)

## 2021-08-02 DIAGNOSIS — C34.92 ADENOCARCINOMA OF LEFT LUNG (HCC): ICD-10-CM

## 2021-08-02 PROCEDURE — 71250 CT THORAX DX C-: CPT

## 2021-08-19 ENCOUNTER — OFFICE VISIT (OUTPATIENT)
Dept: CARDIAC SURGERY | Facility: CLINIC | Age: 56
End: 2021-08-19

## 2021-08-19 VITALS
TEMPERATURE: 98 F | HEART RATE: 69 BPM | BODY MASS INDEX: 22.43 KG/M2 | DIASTOLIC BLOOD PRESSURE: 70 MMHG | WEIGHT: 126.6 LBS | OXYGEN SATURATION: 98 % | SYSTOLIC BLOOD PRESSURE: 125 MMHG | HEIGHT: 63 IN

## 2021-08-19 DIAGNOSIS — F17.218 NICOTINE DEPENDENCE, CIGARETTES, WITH OTHER NICOTINE-INDUCED DISORDERS: ICD-10-CM

## 2021-08-19 DIAGNOSIS — J43.1 PANLOBULAR EMPHYSEMA (HCC): ICD-10-CM

## 2021-08-19 DIAGNOSIS — R91.1 SOLITARY PULMONARY NODULE: Primary | ICD-10-CM

## 2021-08-19 DIAGNOSIS — C34.92 ADENOCARCINOMA OF LEFT LUNG (HCC): ICD-10-CM

## 2021-08-19 DIAGNOSIS — E78.2 MIXED HYPERLIPIDEMIA: ICD-10-CM

## 2021-08-19 DIAGNOSIS — G89.29 CHRONIC BILATERAL LOW BACK PAIN WITHOUT SCIATICA: ICD-10-CM

## 2021-08-19 DIAGNOSIS — M54.50 CHRONIC BILATERAL LOW BACK PAIN WITHOUT SCIATICA: ICD-10-CM

## 2021-08-19 DIAGNOSIS — I10 BENIGN ESSENTIAL HTN: ICD-10-CM

## 2021-08-19 PROCEDURE — 99214 OFFICE O/P EST MOD 30 MIN: CPT | Performed by: THORACIC SURGERY (CARDIOTHORACIC VASCULAR SURGERY)

## 2021-09-07 PROCEDURE — U0004 COV-19 TEST NON-CDC HGH THRU: HCPCS | Performed by: FAMILY MEDICINE

## 2021-09-15 DIAGNOSIS — M75.101 ROTATOR CUFF SYNDROME, RIGHT: Primary | ICD-10-CM

## 2021-09-17 ENCOUNTER — OFFICE VISIT (OUTPATIENT)
Dept: ORTHOPEDIC SURGERY | Facility: CLINIC | Age: 56
End: 2021-09-17

## 2021-09-17 VITALS
DIASTOLIC BLOOD PRESSURE: 86 MMHG | SYSTOLIC BLOOD PRESSURE: 124 MMHG | WEIGHT: 125 LBS | HEART RATE: 88 BPM | BODY MASS INDEX: 22.15 KG/M2 | OXYGEN SATURATION: 97 % | HEIGHT: 63 IN

## 2021-09-17 DIAGNOSIS — M75.101 ROTATOR CUFF SYNDROME, RIGHT: ICD-10-CM

## 2021-09-17 DIAGNOSIS — C34.92 ADENOCARCINOMA OF LEFT LUNG (HCC): ICD-10-CM

## 2021-09-17 DIAGNOSIS — S46.011D TRAUMATIC INCOMPLETE TEAR OF RIGHT ROTATOR CUFF, SUBSEQUENT ENCOUNTER: Primary | ICD-10-CM

## 2021-09-17 DIAGNOSIS — J43.1 PANLOBULAR EMPHYSEMA (HCC): ICD-10-CM

## 2021-09-17 DIAGNOSIS — I10 BENIGN ESSENTIAL HTN: ICD-10-CM

## 2021-09-17 PROCEDURE — 99214 OFFICE O/P EST MOD 30 MIN: CPT | Performed by: ORTHOPAEDIC SURGERY

## 2021-09-17 RX ORDER — BUPIVACAINE HCL/0.9 % NACL/PF 0.1 %
2 PLASTIC BAG, INJECTION (ML) EPIDURAL ONCE
Status: CANCELLED | OUTPATIENT
Start: 2021-11-22 | End: 2021-09-17
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
## 2021-09-29 ENCOUNTER — TRANSCRIBE ORDERS (OUTPATIENT)
Dept: PHYSICAL THERAPY | Facility: HOSPITAL | Age: 56
End: 2021-09-29

## 2021-09-29 DIAGNOSIS — M53.3 SI (SACROILIAC) JOINT DYSFUNCTION: Primary | ICD-10-CM

## 2021-10-04 ENCOUNTER — HOSPITAL ENCOUNTER (OUTPATIENT)
Dept: PHYSICAL THERAPY | Facility: HOSPITAL | Age: 56
Setting detail: THERAPIES SERIES
Discharge: HOME | End: 2021-10-04

## 2021-10-04 DIAGNOSIS — M53.3 SI (SACROILIAC) JOINT DYSFUNCTION: Primary | ICD-10-CM

## 2021-10-04 PROCEDURE — 97161 PT EVAL LOW COMPLEX 20 MIN: CPT
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
## 2021-10-06 ENCOUNTER — HOSPITAL ENCOUNTER (OUTPATIENT)
Dept: PHYSICAL THERAPY | Facility: HOSPITAL | Age: 56
Setting detail: THERAPIES SERIES
Discharge: HOME | End: 2021-10-06

## 2021-10-06 DIAGNOSIS — M53.3 SI (SACROILIAC) JOINT DYSFUNCTION: Primary | ICD-10-CM

## 2021-10-06 PROCEDURE — 97110 THERAPEUTIC EXERCISES: CPT

## 2021-10-06 NOTE — THERAPY TREATMENT NOTE
Outpatient Physical Therapy Ortho Treatment Note  [facility]     Patient Name: Alysia Deutsch  : 1965  MRN: [id number]  Today's Date: 10/6/2021      Visit Date: 10/06/2021     Subjective Improvement 0  Visits 2/2  Visits approved 20  RTMD PRN  Recert Date [id number]    Low back and right SI dysfunction; R SI fusion in     Visit Dx:    ICD-10-CM ICD-9-CM   1. SI (sacroiliac) joint dysfunction  M53.3 724.6       Patient Active Problem List   Diagnosis   • Idiopathic osteoporosis   • Solitary pulmonary nodule   • Adenocarcinoma of left lung (HCC)   • Panlobular emphysema (HCC)   • Mixed hyperlipidemia   • Benign essential HTN   • Generalized abdominal pain   • Other constipation   • Gastroesophageal reflux disease   • Chronic low back pain   • Rotator cuff syndrome, right   • Right hip pain   • Tear of right rotator cuff   • Lateral epicondylitis, right elbow   • Lumbar degenerative disc disease   • Nicotine dependence, cigarettes, with other nicotine-induced disorders        Past Medical History:   Diagnosis Date   • Back pain    • Hepatitis    • Lung mass    • PONV (postoperative nausea and vomiting)         Past Surgical History:   Procedure Laterality Date   • BREAST AUGMENTATION     • COLONOSCOPY N/A 10/27/2020    Procedure: COLONOSCOPY;  Surgeon: Carlos Enrique Alcantara MD;  Location: [facility] ENDOSCOPY;  Service: Gastroenterology;  Laterality: N/A;   • ENDOSCOPY N/A 10/27/2020    Procedure: ESOPHAGOGASTRODUODENOSCOPY;  Surgeon: Carlos Enrique Alcantara MD;  Location: [facility] ENDOSCOPY;  Service: Gastroenterology;  Laterality: N/A;   • LAPAROSCOPIC TUBAL LIGATION     • THORACOSCOPY Left 2020    Procedure: LEFT THORACOSCOPY VIDEO ASSISTED THORACOTOMY pulmonary resection thoracic mediastinal lymphadenectomy bronchoscopy;  Surgeon: Horace Christiansen MD;  Location: [facility] OR;  Service: Cardiothoracic;  Laterality: Left;  Bronch [phone]  VATS [phone]       PT Ortho     Row Name 10/06/21 1000        Precautions and Contraindications    Precautions  lung CA 2020  -CP    Contraindications  No Modalities  -CP       Subjective Pain    Able to rate subjective pain?  yes  -CP    Pre-Treatment Pain Level  2  -CP      User Key  (r) = Recorded By, (t) = Taken By, (c) = Cosigned By    Initials Name Provider Type    Ute Iverson PTA Physical Therapy Assistant                      PT Assessment/Plan     Row Name 10/06/21 1057          PT Assessment    Assessment Comments  Patient is post lung CA- no modolities.  She tolerated ther ex well with no increase in pain.  Patient is willing to try aquatics  Patient was 15 minutes late for her appointment  -CP        PT Plan    PT Frequency  2x/week  -CP     Predicted Duration of Therapy Intervention (PT)  4-6 week  -CP     PT Plan Comments  Cont with POC.  aquatics next visit approved by primary PT  -CP       User Key  (r) = Recorded By, (t) = Taken By, (c) = Cosigned By    Initials Name Provider Type    Ute Iverson PTA Physical Therapy Assistant          Modalities     Row Name 10/06/21 1000             Subjective Comments    Subjective Comments  Patient states that she still has low back and right hip pain.  She is post lung cancer and goes for check ups every 6 month.    -CP         Subjective Pain    Post-Treatment Pain Level  -- numb from ice  -CP         Ice    Ice Applied  Yes  -CP      Location  right low back  -CP      PT Ice Rx Minutes  15  -CP      Ice S/P Rx  Yes  -CP        User Key  (r) = Recorded By, (t) = Taken By, (c) = Cosigned By    Initials Name Provider Type    Ute Iverson PTA Physical Therapy Assistant        OP Exercises     Row Name 10/06/21 1059 10/06/21 1000          Subjective Comments    Subjective Comments  --  Patient states that she still has low back and right hip pain.  She is post lung cancer and goes for check ups every 6 month.    -CP        Subjective Pain    Able to rate subjective pain?  --  yes  -CP      "Pre-Treatment Pain Level  --  2  -CP     Post-Treatment Pain Level  --  -- numb from ice  -CP        Total Minutes    13182 - PT Therapeutic Exercise Minutes  33  -CP  --        Exercise 1    Exercise Name 1  --  Pro II level 2  -CP     Time 1  --  10  -CP        Exercise 2    Exercise Name 2  --  Standing HS stretch  -CP     Cueing 2  --  Verbal;Demo  -CP     Sets 2  --  3  -CP     Time 2  --  30  -CP        Exercise 3    Exercise Name 3  --  inlcine stretch  -CP     Cueing 3  --  Verbal;Demo  -CP     Sets 3  --  3  -CP     Time 3  --  30  -CP        Exercise 4    Exercise Name 4  --  manual SL hip fl stretch  -CP     Cueing 4  --  Verbal;Tactile  -CP     Sets 4  --  3  -CP     Time 4  --  30  -CP        Exercise 5    Exercise Name 5  --  clamshells  -CP     Cueing 5  --  Verbal;Tactile  -CP     Sets 5  --  2  -CP     Reps 5  --  10  -CP        Exercise 6    Exercise Name 6  --  reverse clamshells  -CP     Cueing 6  --  Verbal;Tactile  -CP     Sets 6  --  2  -CP     Reps 6  --  10  -CP        Exercise 7    Exercise Name 7  --  supine TA with BKLL  -CP     Cueing 7  --  Verbal;Tactile  -CP     Sets 7  --  2  -CP     Reps 7  --  10  -CP     Time 7  --  5\" holds  -CP        Exercise 8    Exercise Name 8  --  LTR stretch  -CP     Cueing 8  --  Verbal;Tactile  -CP     Reps 8  --  20  -CP        Exercise 9    Exercise Name 9  --  bridging with HIP AD  -CP     Cueing 9  --  Verbal;Tactile  -CP     Sets 9  --  2  -CP     Reps 9  --  10  -CP       User Key  (r) = Recorded By, (t) = Taken By, (c) = Cosigned By    Initials Name Provider Type    CP Ute Dominguez, PTA Physical Therapy Assistant                       PT OP Goals     Row Name 10/06/21 1000          PT Short Term Goals    STG Date to Achieve  11/01/21  -CP     STG 1  Pt is independent with HEP.   -CP     STG 1 Progress  Ongoing  -CP     STG 2  Pt demo's minimal to no HS and glute flexability limitaitons.   -CP     STG 2 Progress  Progressing  -CP     STG 3  Pt " demos improved trunk flexion to reach finger tips to toes.   -CP     STG 3 Progress  Progressing  -CP        Long Term Goals    LTG Date to Achieve  11/15/21  -CP     LTG 1  RLE hip MMT improved to 4/5 or greater in all planes.   -CP     LTG 1 Progress  Progressing  -CP     LTG 2  RLE knee flexion/extension 4+/5 or greater.   -CP     LTG 2 Progress  Progressing  -CP     LTG 3  LEFS improved to 50/80 or better.   -CP     LTG 3 Progress  Not Met  -CP        Time Calculation    PT Goal Re-Cert Due Date  10/25/21  -CP       User Key  (r) = Recorded By, (t) = Taken By, (c) = Cosigned By    Initials Name Provider Type    CP Ute Dominguez, AMADO Physical Therapy Assistant          Therapy Education  Education Details: LTR stretch, bridging, clamshells, reverse clamshells, supine TA with BKLL  Given: HEP  Program: New  How Provided: Verbal, Demonstration, Written  Provided to: Patient  Level of Understanding: Teach back education performed, Verbalized, Demonstrated              Time Calculation:   Start Time: 0945  Stop Time: 1030  Time Calculation (min): 45 min  Total Timed Code Minutes- PT: 33 minute(s)  Timed Charges  85828 - PT Therapeutic Exercise Minutes: 33  Untimed Charges  PT Ice Rx Minutes: 15  Total Minutes  Timed Charges Total Minutes: 33  Untimed Charges Total Minutes: 15   Total Minutes: 33  Therapy Charges for Today     Code Description Service Date Service Provider Modifiers Qty    57986945478 HC PT THER SUPP EA 15 MIN 10/6/2021 Ute Dominguez PTA GP 1    07960706781 HC PT THER PROC EA 15 MIN 10/6/2021 Ute Dominguez PTA GP 2                    Ute Dominguez PTA  10/6/2021

## 2021-10-11 ENCOUNTER — HOSPITAL ENCOUNTER (OUTPATIENT)
Dept: PHYSICAL THERAPY | Facility: HOSPITAL | Age: 56
Setting detail: THERAPIES SERIES
Discharge: HOME | End: 2021-10-11

## 2021-10-11 DIAGNOSIS — M53.3 SI (SACROILIAC) JOINT DYSFUNCTION: Primary | ICD-10-CM
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
## 2021-10-11 DIAGNOSIS — M25.551 RIGHT HIP PAIN: ICD-10-CM

## 2021-10-11 PROCEDURE — 97113 AQUATIC THERAPY/EXERCISES: CPT
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
## 2021-10-13 ENCOUNTER — APPOINTMENT (OUTPATIENT)
Dept: PHYSICAL THERAPY | Facility: HOSPITAL | Age: 56
End: 2021-10-13

## 2021-10-15 ENCOUNTER — HOSPITAL ENCOUNTER (OUTPATIENT)
Dept: PHYSICAL THERAPY | Facility: HOSPITAL | Age: 56
Setting detail: THERAPIES SERIES
Discharge: HOME | End: 2021-10-15

## 2021-10-15 DIAGNOSIS — M53.3 SI (SACROILIAC) JOINT DYSFUNCTION: Primary | ICD-10-CM

## 2021-10-15 DIAGNOSIS — M25.551 RIGHT HIP PAIN: ICD-10-CM

## 2021-10-15 PROCEDURE — 97110 THERAPEUTIC EXERCISES: CPT

## 2021-10-15 NOTE — THERAPY TREATMENT NOTE
"    Outpatient Physical Therapy Ortho Treatment Note  [facility]     Patient Name: Alysia Deutsch  : 1965  MRN: [id number]  Today's Date: 10/15/2021      Visit Date: 10/15/2021   Attendance:   Subjective improvement: \"Better than I was\"  Recert: 10/25/21  MD Appointment: PRN      Visit Dx:    ICD-10-CM ICD-9-CM   1. SI (sacroiliac) joint dysfunction  M53.3 724.6   2. Right hip pain  M25.551 719.45       Patient Active Problem List   Diagnosis   • Idiopathic osteoporosis   • Solitary pulmonary nodule   • Adenocarcinoma of left lung (HCC)   • Panlobular emphysema (HCC)   • Mixed hyperlipidemia   • Benign essential HTN   • Generalized abdominal pain   • Other constipation   • Gastroesophageal reflux disease   • Chronic low back pain   • Rotator cuff syndrome, right   • Right hip pain   • Tear of right rotator cuff   • Lateral epicondylitis, right elbow   • Lumbar degenerative disc disease   • Nicotine dependence, cigarettes, with other nicotine-induced disorders        Past Medical History:   Diagnosis Date   • Back pain    • Hepatitis    • Lung mass    • PONV (postoperative nausea and vomiting)         Past Surgical History:   Procedure Laterality Date   • BREAST AUGMENTATION     • COLONOSCOPY N/A 10/27/2020    Procedure: COLONOSCOPY;  Surgeon: Carlos Enrique Alcantara MD;  Location: [facility] ENDOSCOPY;  Service: Gastroenterology;  Laterality: N/A;   • ENDOSCOPY N/A 10/27/2020    Procedure: ESOPHAGOGASTRODUODENOSCOPY;  Surgeon: Carlos Enrique Alcantara MD;  Location: [facility] ENDOSCOPY;  Service: Gastroenterology;  Laterality: N/A;   • LAPAROSCOPIC TUBAL LIGATION     • THORACOSCOPY Left 2020    Procedure: LEFT THORACOSCOPY VIDEO ASSISTED THORACOTOMY pulmonary resection thoracic mediastinal lymphadenectomy bronchoscopy;  Surgeon: Horace Christiansen MD;  Location: [facility] OR;  Service: Cardiothoracic;  Laterality: Left;  [place] [phone]  VATS [phone]                        PT Assessment/Plan     Row " "Name 10/15/21 1600          PT Assessment    Assessment Comments Pt britney tx well today. She performed well with CKC therex. She had slight increase in pain with SL Clamshells and standing marches.  -EM            PT Plan    PT Frequency 2x/week  -EM     Predicted Duration of Therapy Intervention (PT) 4-6 wks  -EM     PT Plan Comments Cont current POC, progress gently as britney. Update HEP.  -EM           User Key  (r) = Recorded By, (t) = Taken By, (c) = Cosigned By    Initials Name Provider Type    Avel Palacios, AMADO Physical Therapy Assistant                 Modalities     Row Name 10/15/21 1500             Subjective Pain    Post-Treatment Pain Level 3  -EM            User Key  (r) = Recorded By, (t) = Taken By, (c) = Cosigned By    Initials Name Provider Type    Avel Palacios, AMADO Physical Therapy Assistant               OP Exercises     Row Name 10/15/21 1500             Subjective Comments    Subjective Comments Pt states she has most of her troubles  -EM              Subjective Pain    Able to rate subjective pain? yes  -EM      Pre-Treatment Pain Level 2  -EM      Post-Treatment Pain Level 3  -EM              Exercise 1    Exercise Name 1 PRO II-Seat 6-Course  -EM      Time 1 10'  -EM              Exercise 2    Exercise Name 2 St Ham S  -EM      Sets 2 3  -EM      Time 2 30\"  -EM              Exercise 3    Exercise Name 3 R Seated Piriformis S  -EM      Sets 3 3  -EM      Time 3 30\"  -EM              Exercise 4    Exercise Name 4 Fwd/Lat Step Up: 6\"  -EM      Sets 4 1  -EM      Reps 4 20  -EM              Exercise 5    Exercise Name 5 B St Hip Hikes  -EM      Sets 5 1  -EM      Reps 5 20  -EM              Exercise 6    Exercise Name 6 St/Seated Marches  -EM      Sets 6 1  -EM      Reps 6 x10/x10  -EM      Additional Comments increased pain with standing  -EM              Exercise 7    Exercise Name 7 SL Clamshells  -EM      Sets 7 2  -EM      Reps 7 10  -EM      Additional Comments Slight increase in " pain  -EM            User Key  (r) = Recorded By, (t) = Taken By, (c) = Cosigned By    Initials Name Provider Type    EM Avel Osei PTA Physical Therapy Assistant                                                Time Calculation:   Start Time: 1510  Stop Time: 1556  Time Calculation (min): 46 min  Total Timed Code Minutes- PT: 46 minute(s)  Therapy Charges for Today     Code Description Service Date Service Provider Modifiers Qty    83792230470 HC PT THER PROC EA 15 MIN 10/15/2021 Avel Osei PTA GP 3                    Avel Osei PTA  10/15/2021

## 2021-10-19 ENCOUNTER — HOSPITAL ENCOUNTER (OUTPATIENT)
Dept: PHYSICAL THERAPY | Facility: HOSPITAL | Age: 56
Setting detail: THERAPIES SERIES
Discharge: HOME | End: 2021-10-19

## 2021-10-19 DIAGNOSIS — M53.3 SI (SACROILIAC) JOINT DYSFUNCTION: Primary | ICD-10-CM

## 2021-10-19 PROCEDURE — 97113 AQUATIC THERAPY/EXERCISES: CPT

## 2021-10-19 NOTE — THERAPY TREATMENT NOTE
Outpatient Physical Therapy Hand Treatment Note   [facility]     Patient Name: Alysia Deutsch  : 1965  MRN: [id number]  Today's Date: 10/19/2021         Visit Date: 10/19/2021    Visit Dx:    ICD-10-CM ICD-9-CM   1. SI (sacroiliac) joint dysfunction  M53.3 724.6       Patient Active Problem List   Diagnosis   • Idiopathic osteoporosis   • Solitary pulmonary nodule   • Adenocarcinoma of left lung (HCC)   • Panlobular emphysema (HCC)   • Mixed hyperlipidemia   • Benign essential HTN   • Generalized abdominal pain   • Other constipation   • Gastroesophageal reflux disease   • Chronic low back pain   • Rotator cuff syndrome, right   • Right hip pain   • Tear of right rotator cuff   • Lateral epicondylitis, right elbow   • Lumbar degenerative disc disease   • Nicotine dependence, cigarettes, with other nicotine-induced disorders           PT Ortho     Row Name 10/19/21 1600       Precautions and Contraindications    Precautions/Limitations --  S/P SI fusion on 2021  -CP    Precautions lung CA   -CP    Contraindications No Modalities  -CP       Subjective Pain    Able to rate subjective pain? yes  -CP    Pre-Treatment Pain Level 2  -CP    Post-Treatment Pain Level 1  -CP          User Key  (r) = Recorded By, (t) = Taken By, (c) = Cosigned By    Initials Name Provider Type    Ute Iverson, AMADO Physical Therapy Assistant                         PT Assessment/Plan     Row Name 10/19/21 1643          PT Assessment    Assessment Comments Patient tolerated aquatics and deep hang well with decrease pain noted.  Patient is 19 weeks 1 day post SI fusion.  -CP            PT Plan    PT Frequency 2x/week  -CP     Predicted Duration of Therapy Intervention (PT) 4-6 weeks  -CP     PT Plan Comments Cont with POC.  progress aquatics as tolerated  -CP           User Key  (r) = Recorded By, (t) = Taken By, (c) = Cosigned By    Initials Name Provider Type    Ute Iverson PTA Physical Therapy  Assistant                   OP Exercises     Row Name 10/19/21 1645 10/19/21 1600          Subjective Comments    Subjective Comments -- patient states that her pain is usually a 2/10.  She does thinks [facility] [employer] [facility] gave her a good work out the other day.  She is scheduled for one more injection into her SI area.  -CP            Subjective Pain    Able to rate subjective pain? -- yes  -CP     Pre-Treatment Pain Level -- 2  -CP     Post-Treatment Pain Level -- 1  -CP            Aquatics    Aquatics performed? -- Yes  -CP     61262 - Aquatic Therapy Minutes 39  -CP --            Exercise 1    Exercise Name 1 -- aquatics walk 3 way  -CP     Cueing 1 -- Verbal; Tactile  -CP     Time 1 -- 5' each  -CP            Exercise 2    Exercise Name 2 -- aquatcis trunk rotation with cane  -CP     Cueing 2 -- Verbal  -CP     Reps 2 -- 20  -CP            Exercise 3    Exercise Name 3 -- aquatics hip fl and ab  -CP     Cueing 3 -- Verbal  -CP     Sets 3 -- 1  -CP     Reps 3 -- 10 each  -CP            Exercise 4    Exercise Name 4 -- aquatics float steps  -CP     Cueing 4 -- Verbal  -CP     Sets 4 -- 2  -CP     Reps 4 -- 10  -CP     Time 4 -- yellow ring float  -CP            Exercise 5    Exercise Name 5 -- aquatics deep hand bike  -CP     Cueing 5 -- Verbal  -CP     Time 5 -- 5  -CP            Exercise 6    Exercise Name 6 -- aquatics deep hand  -CP     Cueing 6 -- Verbal  -CP     Time 6 -- 5  -CP           User Key  (r) = Recorded By, (t) = Taken By, (c) = Cosigned By    Initials Name Provider Type    CP Ute Dominguez, PTA Physical Therapy Assistant                              PT OP Goals     Row Name 10/19/21 1600          PT Short Term Goals    STG Date to Achieve 11/01/21  -CP     STG 1 Pt is independent with HEP.   -CP     STG 1 Progress Ongoing  -CP     STG 2 Pt demo's minimal to no HS and glute flexability limitaitons.   -CP     STG 2 Progress Progressing  -CP     STG 3 Pt demos improved trunk flexion to reach finger  tips to toes.   -CP     STG 3 Progress Progressing  -CP            Long Term Goals    LTG Date to Achieve 11/15/21  -CP     LTG 1 RLE hip MMT improved to 4/5 or greater in all planes.   -CP     LTG 1 Progress Progressing  -CP     LTG 2 RLE knee flexion/extension 4+/5 or greater.   -CP     LTG 2 Progress Progressing  -CP     LTG 3 LEFS improved to 50/80 or better.   -CP     LTG 3 Progress Not Met  -CP            Time Calculation    PT Goal Re-Cert Due Date 10/25/21  -CP           User Key  (r) = Recorded By, (t) = Taken By, (c) = Cosigned By    Initials Name Provider Type    CP Ute Dominguez, PTA Physical Therapy Assistant                               Time Calculation:   Start Time: 1520  Stop Time: 1559  Time Calculation (min): 39 min  Total Timed Code Minutes- PT: 39 minute(s)  Timed Charges  80425 - Aquatic Therapy Minutes: 39  Total Minutes  Timed Charges Total Minutes: 39   Total Minutes: 39   Timed Therapy Charges  Total Units: 3    Charges  Total Units: 3    Procedure Name Documented Minutes Units Code    HC PT AQUATIC THERAPY EA 15 MIN 39  3    22174 (CPT®)               Documented Minutes  Total Minutes: 39    Therapy Provided Minutes    52344 - Aquatic Therapy Minutes 39              Therapy Charges for Today     Code Description Service Date Service Provider Modifiers Qty    24240126313 HC PT AQUATIC THERAPY EA 15 MIN 10/19/2021 Ute Dominguez PTA GP 3                   Ute Dominguez PTA  10/19/2021 and Outpatient Physical Therapy Ortho Treatment Note  [facility]     Patient Name: Alysia Deutsch  : 1965  MRN: [id number]  Today's Date: 10/19/2021      Visit Date: 10/19/2021     Subjective Improvement a little better  Visits 5/5  Visits approved 20  RTMD PRN  Recert Date 10-    Low back pain/SI pain S/P Left SI fusion on 2021  Post op 19 weeks 1 day    Visit Dx:    ICD-10-CM ICD-9-CM   1. SI (sacroiliac) joint dysfunction  M53.3 724.6       Patient Active Problem List    Diagnosis   • Idiopathic osteoporosis   • Solitary pulmonary nodule   • Adenocarcinoma of left lung (HCC)   • Panlobular emphysema (HCC)   • Mixed hyperlipidemia   • Benign essential HTN   • Generalized abdominal pain   • Other constipation   • Gastroesophageal reflux disease   • Chronic low back pain   • Rotator cuff syndrome, right   • Right hip pain   • Tear of right rotator cuff   • Lateral epicondylitis, right elbow   • Lumbar degenerative disc disease   • Nicotine dependence, cigarettes, with other nicotine-induced disorders        Past Medical History:   Diagnosis Date   • Back pain    • Hepatitis    • Lung mass    • PONV (postoperative nausea and vomiting)         Past Surgical History:   Procedure Laterality Date   • BREAST AUGMENTATION     • COLONOSCOPY N/A 10/27/2020    Procedure: COLONOSCOPY;  Surgeon: Carlos Enrique Alcantara MD;  Location: [facility] ENDOSCOPY;  Service: Gastroenterology;  Laterality: N/A;   • ENDOSCOPY N/A 10/27/2020    Procedure: ESOPHAGOGASTRODUODENOSCOPY;  Surgeon: Carlos Enrique Alcantara MD;  Location: [facility] ENDOSCOPY;  Service: Gastroenterology;  Laterality: N/A;   • LAPAROSCOPIC TUBAL LIGATION     • THORACOSCOPY Left 8/20/2020    Procedure: LEFT THORACOSCOPY VIDEO ASSISTED THORACOTOMY pulmonary resection thoracic mediastinal lymphadenectomy bronchoscopy;  Surgeon: Horace Christiansen MD;  Location: [facility] OR;  Service: Cardiothoracic;  Laterality: Left;  [place] [phone]  VATS [phone]        PT Ortho     Row Name 10/19/21 1600       Precautions and Contraindications    Precautions/Limitations --  S/P SI fusion on 6-  -CP    Precautions lung CA 2020  -CP    Contraindications No Modalities  -CP       Subjective Pain    Able to rate subjective pain? yes  -CP    Pre-Treatment Pain Level 2  -CP    Post-Treatment Pain Level 1  -CP          User Key  (r) = Recorded By, (t) = Taken By, (c) = Cosigned By    Initials Name Provider Type    Ute Iverson, PTA Physical Therapy  Assistant                             PT Assessment/Plan     Row Name 10/19/21 1643          PT Assessment    Assessment Comments Patient tolerated aquatics and deep hang well with decrease pain noted.  Patient is 19 weeks 1 day post SI fusion.  -CP            PT Plan    PT Frequency 2x/week  -CP     Predicted Duration of Therapy Intervention (PT) 4-6 weeks  -CP     PT Plan Comments Cont with POC.  progress aquatics as tolerated  -CP           User Key  (r) = Recorded By, (t) = Taken By, (c) = Cosigned By    Initials Name Provider Type    CP Ute Dominguez, PTA Physical Therapy Assistant                   OP Exercises     Row Name 10/19/21 1645 10/19/21 1600          Subjective Comments    Subjective Comments -- patient states that her pain is usually a 2/10.  She does thinks [facility] pool gave her a good work out the other day.  She is scheduled for one more injection into her SI area.  -CP            Subjective Pain    Able to rate subjective pain? -- yes  -CP     Pre-Treatment Pain Level -- 2  -CP     Post-Treatment Pain Level -- 1  -CP            Aquatics    Aquatics performed? -- Yes  -CP     88798 - Aquatic Therapy Minutes 39  -CP --            Exercise 1    Exercise Name 1 -- aquatics walk 3 way  -CP     Cueing 1 -- Verbal; Tactile  -CP     Time 1 -- 5' each  -CP            Exercise 2    Exercise Name 2 -- aquatcis trunk rotation with cane  -CP     Cueing 2 -- Verbal  -CP     Reps 2 -- 20  -CP            Exercise 3    Exercise Name 3 -- aquatics hip fl and ab  -CP     Cueing 3 -- Verbal  -CP     Sets 3 -- 1  -CP     Reps 3 -- 10 each  -CP            Exercise 4    Exercise Name 4 -- aquatics float steps  -CP     Cueing 4 -- Verbal  -CP     Sets 4 -- 2  -CP     Reps 4 -- 10  -CP     Time 4 -- yellow ring float  -CP            Exercise 5    Exercise Name 5 -- aquatics deep hand bike  -CP     Cueing 5 -- Verbal  -CP     Time 5 -- 5  -CP            Exercise 6    Exercise Name 6 -- aquatics deep hand  -CP      Cueing 6 -- Verbal  -CP     Time 6 -- 5  -CP           User Key  (r) = Recorded By, (t) = Taken By, (c) = Cosigned By    Initials Name Provider Type    CP Ute Dominguez PTA Physical Therapy Assistant                              PT OP Goals     Row Name 10/19/21 1600          PT Short Term Goals    STG Date to Achieve 11/01/21  -CP     STG 1 Pt is independent with HEP.   -CP     STG 1 Progress Ongoing  -CP     STG 2 Pt demo's minimal to no HS and glute flexability limitaitons.   -CP     STG 2 Progress Progressing  -CP     STG 3 Pt demos improved trunk flexion to reach finger tips to toes.   -CP     STG 3 Progress Progressing  -CP            Long Term Goals    LTG Date to Achieve 11/15/21  -CP     LTG 1 RLE hip MMT improved to 4/5 or greater in all planes.   -CP     LTG 1 Progress Progressing  -CP     LTG 2 RLE knee flexion/extension 4+/5 or greater.   -CP     LTG 2 Progress Progressing  -CP     LTG 3 LEFS improved to 50/80 or better.   -CP     LTG 3 Progress Not Met  -CP            Time Calculation    PT Goal Re-Cert Due Date 10/25/21  -CP           User Key  (r) = Recorded By, (t) = Taken By, (c) = Cosigned By    Initials Name Provider Type    CP Ute Dominguez PTA Physical Therapy Assistant                               Time Calculation:   Start Time: 1520  Stop Time: 1559  Time Calculation (min): 39 min  Total Timed Code Minutes- PT: 39 minute(s)  Timed Charges  45833 - Aquatic Therapy Minutes: 39  Total Minutes  Timed Charges Total Minutes: 39   Total Minutes: 39  Therapy Charges for Today     Code Description Service Date Service Provider Modifiers Qty    79226821704 HC PT AQUATIC THERAPY EA 15 MIN 10/19/2021 Ute Dominguez PTA GP 3                    Ute Dominguez PTA  10/19/2021

## 2021-10-21 ENCOUNTER — TELEPHONE (OUTPATIENT)
Dept: ORTHOPEDIC SURGERY | Facility: CLINIC | Age: 56
End: 2021-10-21

## 2021-10-21 ENCOUNTER — HOSPITAL ENCOUNTER (OUTPATIENT)
Dept: PHYSICAL THERAPY | Facility: HOSPITAL | Age: 56
Setting detail: THERAPIES SERIES
Discharge: HOME | End: 2021-10-21

## 2021-10-21 DIAGNOSIS — M53.3 SI (SACROILIAC) JOINT DYSFUNCTION: Primary | ICD-10-CM

## 2021-10-21 DIAGNOSIS — M25.551 RIGHT HIP PAIN: ICD-10-CM
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
## 2021-10-21 PROCEDURE — 97110 THERAPEUTIC EXERCISES: CPT

## 2021-10-21 NOTE — TELEPHONE ENCOUNTER
DR OCONNOR.  PATIENT HAS BEEN DISCHARGED FOR HER S I JOINT AND IS READY TO SCHEDULE THE RIGHT SHOULDER SURGERY.
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
## 2021-10-26 ENCOUNTER — APPOINTMENT (OUTPATIENT)
Dept: PHYSICAL THERAPY | Facility: HOSPITAL | Age: 56
End: 2021-10-26

## 2021-10-28 ENCOUNTER — APPOINTMENT (OUTPATIENT)
Dept: PHYSICAL THERAPY | Facility: HOSPITAL | Age: 56
End: 2021-10-28

## 2021-11-04 ENCOUNTER — OFFICE VISIT (OUTPATIENT)
Dept: ORTHOPEDIC SURGERY | Facility: CLINIC | Age: 56
End: 2021-11-04

## 2021-11-04 ENCOUNTER — PRE-ADMISSION TESTING (OUTPATIENT)
Dept: PREADMISSION TESTING | Facility: HOSPITAL | Age: 56
End: 2021-11-04

## 2021-11-04 VITALS — HEIGHT: 63 IN | BODY MASS INDEX: 22.15 KG/M2 | WEIGHT: 125 LBS

## 2021-11-04 VITALS
DIASTOLIC BLOOD PRESSURE: 68 MMHG | SYSTOLIC BLOOD PRESSURE: 110 MMHG | OXYGEN SATURATION: 97 % | RESPIRATION RATE: 16 BRPM | HEART RATE: 64 BPM

## 2021-11-04 DIAGNOSIS — J43.1 PANLOBULAR EMPHYSEMA (HCC): ICD-10-CM

## 2021-11-04 DIAGNOSIS — F41.1 GENERALIZED ANXIETY DISORDER: ICD-10-CM

## 2021-11-04 DIAGNOSIS — K21.00 GASTROESOPHAGEAL REFLUX DISEASE WITH ESOPHAGITIS WITHOUT HEMORRHAGE: ICD-10-CM

## 2021-11-04 DIAGNOSIS — I10 BENIGN ESSENTIAL HTN: ICD-10-CM

## 2021-11-04 DIAGNOSIS — M75.101 ROTATOR CUFF SYNDROME, RIGHT: Primary | ICD-10-CM

## 2021-11-04 PROCEDURE — 99024 POSTOP FOLLOW-UP VISIT: CPT | Performed by: ORTHOPAEDIC SURGERY

## 2021-11-04 RX ORDER — ACETAMINOPHEN 500 MG
1000 TABLET ORAL EVERY 6 HOURS PRN
COMMUNITY

## 2021-11-04 RX ORDER — SODIUM CHLORIDE, SODIUM GLUCONATE, SODIUM ACETATE, POTASSIUM CHLORIDE AND MAGNESIUM CHLORIDE 526; 502; 368; 37; 30 MG/100ML; MG/100ML; MG/100ML; MG/100ML; MG/100ML
1000 INJECTION, SOLUTION INTRAVENOUS CONTINUOUS PRN
Status: CANCELLED | OUTPATIENT
Start: 2021-11-22

## 2021-11-04 RX ORDER — IBUPROFEN 200 MG
400 TABLET ORAL EVERY 6 HOURS PRN
COMMUNITY
End: 2022-01-04

## 2021-11-04 RX ORDER — LORATADINE 10 MG/1
10 CAPSULE, LIQUID FILLED ORAL DAILY
COMMUNITY
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
## 2021-11-09 NOTE — PROGRESS NOTES
8/19/2021    Alysia Deutsch  1965    Chief Complaint:  Lung cancer    HPI:      PCP:  Rosario Mckeon MD  GI:  Dr Alcantara  Ortho: Dr. Gaines     56y.o. female with HTN(stable, increased risk stroke, rupture), Hyperlipidemia(stable, increase risk cardiovascular events), COPD(chronic progression, increase risk pulmonary complications) and Smoker(uncontrolled, increased risk cardiovascular events) insomnia,  pulmonary nodule (new, suspicious malignancy).  smokes 0.25 PPD.  asymptomatic LLL pulmonary nodule.  No TIA stroke amaurosis.  No MI claudication.   No other associated signs, symptoms or modifying factors.     6/2020 CT Brain:  No acute findings.  6/2020 CT Chest: 20mm nodule LEFT lower lobe.  No significant adenopathy.  Liver, adrenals ok.  8/2020 PET CT: LLL nodule 22mm (SUV 5.4)  8/2020 PFT:  FVC 2.9 (136%), FEV1 1.8 (103%), DLCO 54%  8/2020 LEFT VAT lower lobectomy  Path: Adenocarcinoma, ([id number])  2/2021 CT Chest: no evidence of recurrence.  Liver adrenals ok.:    8/2021 CT Chest:  No evidence recurrence.  Liver adrenals ok.  40mm RIGHT upper lobe bulla    The following portions of the patient's history were reviewed and updated as appropriate: allergies, current medications, past family history, past medical history, past social history, past surgical history and problem list.  Recent images independently reviewed.  Available laboratory values reviewed.    PMH:  Past Medical History:   Diagnosis Date   • Anxiety    • Back pain    • Hepatitis    • Lung mass    • PONV (postoperative nausea and vomiting)    • Shoulder pain, right      Past Surgical History:   Procedure Laterality Date   • BREAST AUGMENTATION     • COLONOSCOPY N/A 10/27/2020    Procedure: COLONOSCOPY;  Surgeon: Carlos Enrique Alcantara MD;  Location: [facility] ENDOSCOPY;  Service: Gastroenterology;  Laterality: N/A;   • ENDOSCOPY N/A 10/27/2020    Procedure: ESOPHAGOGASTRODUODENOSCOPY;  Surgeon: Carlos Enrique Alcantara MD;  Location: [facility]  ENDOSCOPY;  Service: Gastroenterology;  Laterality: N/A;   • JOINT REPLACEMENT      RIGHT HIP   • LAPAROSCOPIC TUBAL LIGATION     • LUNG LOBECTOMY Left 08/20/2020    LOWER   • THORACOSCOPY Left 8/20/2020    Procedure: LEFT THORACOSCOPY VIDEO ASSISTED THORACOTOMY pulmonary resection thoracic mediastinal lymphadenectomy bronchoscopy;  Surgeon: Horace Christiansen MD;  Location: [facility];  Service: Cardiothoracic;  Laterality: Left;  Bronch [id number]  VATS [phone]     Family History   Problem Relation Age of Onset   • Diabetes Other    • Cancer Other    • No Known Problems Mother    • Heart disease Father    • Pancreatic cancer Father    • Colon cancer Maternal Grandfather      Social History     Tobacco Use   • Smoking status: Current Some Day Smoker     Packs/day: 0.50     Years: 30.00     Pack years: 15.00     Types: Cigarettes   • Smokeless tobacco: Never Used   Vaping Use   • Vaping Use: Never used   Substance Use Topics   • Alcohol use: Not Currently   • Drug use: Not Currently     Comment: 30 YRS AGO       ALLERGIES:  Allergies   Allergen Reactions   • Codeine Hives and Itching   • Morphine Nausea And Vomiting     Severe vomiting   • Adhesive Tape Rash         MEDICATIONS:    Current Outpatient Medications:   •  fluticasone (FLONASE) 50 MCG/ACT nasal spray, 1 spray into the nostril(s) as directed by provider Daily., Disp: , Rfl:   •  temazepam (RESTORIL) 15 MG capsule, Take 15-30 mg by mouth Every Night., Disp: , Rfl:   •  acetaminophen (TYLENOL) 500 MG tablet, Take 1,000 mg by mouth Every 6 (Six) Hours As Needed for Mild Pain ., Disp: , Rfl:   •  FLUoxetine (PROzac) 10 MG tablet, Take 10 mg by mouth Daily., Disp: , Rfl:   •  ibuprofen (ADVIL,MOTRIN) 200 MG tablet, Take 400 mg by mouth Every 6 (Six) Hours As Needed for Mild Pain ., Disp: , Rfl:   •  Loratadine 10 MG capsule, Take 10 mg by mouth Daily., Disp: , Rfl:     Review of Systems   Review of Systems   Constitutional: Positive for malaise/fatigue.  "Negative for weight loss.   Cardiovascular: Positive for dyspnea on exertion. Negative for chest pain and claudication.   Respiratory: Negative for cough and shortness of breath.    Skin: Negative for color change and poor wound healing.   Musculoskeletal: Positive for arthritis, joint pain, muscle weakness and neck pain.   Neurological: Negative for dizziness, numbness and weakness.       Physical Exam   Vitals:    08/19/21 1413   BP: 125/70   BP Location: Left arm   Patient Position: Sitting   Pulse: 69   Temp: 98 °F (36.7 °C)   TempSrc: Infrared   SpO2: 98%   Weight: 57.4 kg (126 lb 9.6 oz)   Height: 160 cm (63\")     Body surface area is 1.59 meters squared.  Body mass index is 22.43 kg/m².  Physical Exam  Constitutional:       General: She is not in acute distress.     Appearance: She is not ill-appearing.   HENT:      Right Ear: Hearing normal.      Left Ear: Hearing normal.      Nose: No nasal deformity.      Mouth/Throat:      Dentition: Normal dentition. Does not have dentures.   Cardiovascular:      Rate and Rhythm: Normal rate and regular rhythm.      Pulses:           Carotid pulses are 2+ on the right side and 2+ on the left side.       Radial pulses are 2+ on the right side and 2+ on the left side.        Posterior tibial pulses are 2+ on the right side and 2+ on the left side.      Heart sounds: No murmur heard.      Pulmonary:      Effort: Pulmonary effort is normal.      Breath sounds: Normal breath sounds.   Abdominal:      General: There is no distension.      Palpations: Abdomen is soft. There is no mass.      Tenderness: There is no abdominal tenderness.   Musculoskeletal:         General: No deformity.      Comments: Gait normal.    Skin:     General: Skin is warm and dry.      Coloration: Skin is not pale.      Findings: No erythema.      Comments: No venous staining   Neurological:      Mental Status: She is alert and oriented to person, place, and time.   Psychiatric:         Speech: Speech " normal.         Behavior: Behavior is cooperative.         Thought Content: Thought content normal.         Judgment: Judgment normal.         BUN   Date Value Ref Range Status   08/21/2020 6 6 - 20 mg/dL Final     Creatinine   Date Value Ref Range Status   08/21/2020 0.67 0.57 - 1.00 mg/dL Final     eGFR Non  Amer   Date Value Ref Range Status   08/21/2020 91 >60 mL/min/1.73 Final       ASSESSMENT:  Diagnoses and all orders for this visit:    1. Solitary pulmonary nodule (Primary)    2. Adenocarcinoma of left lung (HCC)  -     CT Chest Without Contrast Diagnostic; Future    3. Panlobular emphysema (HCC)    4. Mixed hyperlipidemia    5. Benign essential HTN    6. Chronic bilateral low back pain without sciatica    7. Nicotine dependence, cigarettes, with other nicotine-induced disorders    PLAN:  Detailed discussion with Alysia Deutsch regarding situation and options.  Stable lung cancer.  No evidence recurrence or metastatic disease..  Multiple risk factors with severe comorbidities.  No intervention indicated at this time.  Will follow with interval imaging.  Risks, benefits discussed.  Understands and wishes to proceed with plan.    Return in 6 months with CT Chest    Return after above studies complete  Smoking cessation advised and assistance options offered including behavioral counseling ([facility] [place] [facility]ess[facility]), Nicotine replacement therapy (patches or gum), pharmacologic therapy (Chantix, Wellbutrin). patient understands that continued use of tobacco products increases risk of heart disease, stroke, cancer; counseling for 3-5min.    Recommended regular physical activity, progressive walking program.  Continue current medications as directed.  Advance Care Planning   ACP discussion was declined by the patient. Patient has an advance directive in EMR which is still valid.     Thank you for the opportunity to participate in this patient's care.    Copy to primary care provider.

## 2021-11-19 ENCOUNTER — LAB (OUTPATIENT)
Dept: LAB | Facility: HOSPITAL | Age: 56
End: 2021-11-19

## 2021-11-19 DIAGNOSIS — Z01.818 PREOP TESTING: Primary | ICD-10-CM

## 2021-11-19 LAB — SARS-COV-2 N GENE RESP QL NAA+PROBE: NOT DETECTED

## 2021-11-19 PROCEDURE — C9803 HOPD COVID-19 SPEC COLLECT: HCPCS

## 2021-11-19 PROCEDURE — 87635 SARS-COV-2 COVID-19 AMP PRB: CPT

## 2021-11-21 ENCOUNTER — ANESTHESIA EVENT (OUTPATIENT)
Dept: PERIOP | Facility: HOSPITAL | Age: 56
End: 2021-11-21

## 2021-11-22 ENCOUNTER — ANESTHESIA (OUTPATIENT)
Dept: PERIOP | Facility: HOSPITAL | Age: 56
End: 2021-11-22

## 2021-11-22 ENCOUNTER — HOSPITAL ENCOUNTER (OUTPATIENT)
Facility: HOSPITAL | Age: 56
Setting detail: HOSPITAL OUTPATIENT SURGERY
Discharge: HOME | End: 2021-11-22
Attending: ORTHOPAEDIC SURGERY | Admitting: ORTHOPAEDIC SURGERY

## 2021-11-22 VITALS
HEIGHT: 63 IN | RESPIRATION RATE: 18 BRPM | DIASTOLIC BLOOD PRESSURE: 66 MMHG | BODY MASS INDEX: 22.27 KG/M2 | WEIGHT: 125.66 LBS | SYSTOLIC BLOOD PRESSURE: 131 MMHG | OXYGEN SATURATION: 93 % | HEART RATE: 67 BPM | TEMPERATURE: 97 F

## 2021-11-22 DIAGNOSIS — M75.101 ROTATOR CUFF SYNDROME, RIGHT: Primary | ICD-10-CM

## 2021-11-22 DIAGNOSIS — J43.1 PANLOBULAR EMPHYSEMA (HCC): ICD-10-CM

## 2021-11-22 DIAGNOSIS — C34.92 ADENOCARCINOMA OF LEFT LUNG (HCC): ICD-10-CM

## 2021-11-22 DIAGNOSIS — I10 BENIGN ESSENTIAL HTN: ICD-10-CM

## 2021-11-22 DIAGNOSIS — S46.011D TRAUMATIC INCOMPLETE TEAR OF RIGHT ROTATOR CUFF, SUBSEQUENT ENCOUNTER: ICD-10-CM

## 2021-11-22 LAB
AMPHET+METHAMPHET UR QL: NEGATIVE
AMPHETAMINES UR QL: NEGATIVE
BARBITURATES UR QL SCN: NEGATIVE
BENZODIAZ UR QL SCN: POSITIVE
BUPRENORPHINE SERPL-MCNC: NEGATIVE NG/ML
CANNABINOIDS SERPL QL: NEGATIVE
COCAINE UR QL: NEGATIVE
METHADONE UR QL SCN: NEGATIVE
OPIATES UR QL: NEGATIVE
OXYCODONE UR QL SCN: NEGATIVE
PCP UR QL SCN: NEGATIVE
PROPOXYPH UR QL: NEGATIVE
TRICYCLICS UR QL SCN: NEGATIVE

## 2021-11-22 PROCEDURE — 25010000002 DEXAMETHASONE PER 1 MG: Performed by: NURSE ANESTHETIST, CERTIFIED REGISTERED

## 2021-11-22 PROCEDURE — 80306 DRUG TEST PRSMV INSTRMNT: CPT | Performed by: ANESTHESIOLOGY

## 2021-11-22 PROCEDURE — 25010000002 EPINEPHRINE 1 MG/ML SOLUTION: Performed by: ORTHOPAEDIC SURGERY

## 2021-11-22 PROCEDURE — 29824 SHO ARTHRS SRG DSTL CLAVICLC: CPT | Performed by: SPECIALIST/TECHNOLOGIST, OTHER

## 2021-11-22 PROCEDURE — 25010000002 CEFAZOLIN PER 500 MG: Performed by: ORTHOPAEDIC SURGERY

## 2021-11-22 PROCEDURE — 25010000002 ROPIVACAINE PER 1 MG: Performed by: ANESTHESIOLOGY

## 2021-11-22 PROCEDURE — 25010000002 PROPOFOL 10 MG/ML EMULSION

## 2021-11-22 PROCEDURE — 25010000002 PHENYLEPHRINE 10 MG/ML SOLUTION

## 2021-11-22 PROCEDURE — 25010000002 MIDAZOLAM PER 1 MG

## 2021-11-22 PROCEDURE — 29826 SHO ARTHRS SRG DECOMPRESSION: CPT | Performed by: ORTHOPAEDIC SURGERY

## 2021-11-22 PROCEDURE — 29826 SHO ARTHRS SRG DECOMPRESSION: CPT | Performed by: SPECIALIST/TECHNOLOGIST, OTHER

## 2021-11-22 PROCEDURE — 25010000002 FENTANYL CITRATE (PF) 50 MCG/ML SOLUTION

## 2021-11-22 PROCEDURE — 29824 SHO ARTHRS SRG DSTL CLAVICLC: CPT | Performed by: ORTHOPAEDIC SURGERY

## 2021-11-22 PROCEDURE — 25010000002 NEOSTIGMINE 10 MG/10ML SOLUTION: Performed by: NURSE ANESTHETIST, CERTIFIED REGISTERED

## 2021-11-22 RX ORDER — DEXAMETHASONE SODIUM PHOSPHATE 4 MG/ML
INJECTION, SOLUTION INTRA-ARTICULAR; INTRALESIONAL; INTRAMUSCULAR; INTRAVENOUS; SOFT TISSUE AS NEEDED
Status: DISCONTINUED | OUTPATIENT
Start: 2021-11-22 | End: 2021-11-22 | Stop reason: SURG

## 2021-11-22 RX ORDER — PHENYLEPHRINE HYDROCHLORIDE 10 MG/ML
INJECTION INTRAVENOUS AS NEEDED
Status: DISCONTINUED | OUTPATIENT
Start: 2021-11-22 | End: 2021-11-22 | Stop reason: SURG

## 2021-11-22 RX ORDER — ROPIVACAINE HYDROCHLORIDE 5 MG/ML
INJECTION, SOLUTION EPIDURAL; INFILTRATION; PERINEURAL
Status: COMPLETED | OUTPATIENT
Start: 2021-11-22 | End: 2021-11-22

## 2021-11-22 RX ORDER — NEOSTIGMINE METHYLSULFATE 1 MG/ML
INJECTION, SOLUTION INTRAVENOUS AS NEEDED
Status: DISCONTINUED | OUTPATIENT
Start: 2021-11-22 | End: 2021-11-22 | Stop reason: SURG

## 2021-11-22 RX ORDER — MEPERIDINE HYDROCHLORIDE 25 MG/ML
12.5 INJECTION INTRAMUSCULAR; INTRAVENOUS; SUBCUTANEOUS
Status: DISCONTINUED | OUTPATIENT
Start: 2021-11-22 | End: 2021-11-22 | Stop reason: HOSPADM

## 2021-11-22 RX ORDER — KETAMINE HCL IN NACL, ISO-OSM 100MG/10ML
SYRINGE (ML) INJECTION AS NEEDED
Status: DISCONTINUED | OUTPATIENT
Start: 2021-11-22 | End: 2021-11-22 | Stop reason: SURG

## 2021-11-22 RX ORDER — FENTANYL CITRATE 50 UG/ML
INJECTION, SOLUTION INTRAMUSCULAR; INTRAVENOUS AS NEEDED
Status: DISCONTINUED | OUTPATIENT
Start: 2021-11-22 | End: 2021-11-22 | Stop reason: SURG

## 2021-11-22 RX ORDER — ONDANSETRON 4 MG/1
8 TABLET, FILM COATED ORAL EVERY 8 HOURS PRN
Qty: 30 TABLET | Refills: 0 | Status: SHIPPED | OUTPATIENT
Start: 2021-11-22 | End: 2021-12-06

## 2021-11-22 RX ORDER — MIDAZOLAM HYDROCHLORIDE 1 MG/ML
INJECTION INTRAMUSCULAR; INTRAVENOUS AS NEEDED
Status: DISCONTINUED | OUTPATIENT
Start: 2021-11-22 | End: 2021-11-22 | Stop reason: SURG

## 2021-11-22 RX ORDER — SODIUM CHLORIDE, SODIUM GLUCONATE, SODIUM ACETATE, POTASSIUM CHLORIDE AND MAGNESIUM CHLORIDE 526; 502; 368; 37; 30 MG/100ML; MG/100ML; MG/100ML; MG/100ML; MG/100ML
1000 INJECTION, SOLUTION INTRAVENOUS CONTINUOUS PRN
Status: DISCONTINUED | OUTPATIENT
Start: 2021-11-22 | End: 2021-11-22 | Stop reason: HOSPADM

## 2021-11-22 RX ORDER — PROPOFOL 10 MG/ML
VIAL (ML) INTRAVENOUS AS NEEDED
Status: DISCONTINUED | OUTPATIENT
Start: 2021-11-22 | End: 2021-11-22 | Stop reason: SURG

## 2021-11-22 RX ORDER — ROCURONIUM BROMIDE 10 MG/ML
INJECTION, SOLUTION INTRAVENOUS AS NEEDED
Status: DISCONTINUED | OUTPATIENT
Start: 2021-11-22 | End: 2021-11-22 | Stop reason: SURG

## 2021-11-22 RX ORDER — LIDOCAINE HYDROCHLORIDE 20 MG/ML
INJECTION, SOLUTION INFILTRATION; PERINEURAL AS NEEDED
Status: DISCONTINUED | OUTPATIENT
Start: 2021-11-22 | End: 2021-11-22 | Stop reason: SURG

## 2021-11-22 RX ORDER — OXYCODONE AND ACETAMINOPHEN 7.5; 325 MG/1; MG/1
1 TABLET ORAL EVERY 4 HOURS PRN
Qty: 30 TABLET | Refills: 0 | Status: SHIPPED | OUTPATIENT
Start: 2021-11-22 | End: 2021-12-06

## 2021-11-22 RX ORDER — EPHEDRINE SULFATE 50 MG/ML
INJECTION, SOLUTION INTRAVENOUS AS NEEDED
Status: DISCONTINUED | OUTPATIENT
Start: 2021-11-22 | End: 2021-11-22 | Stop reason: SURG

## 2021-11-22 RX ORDER — BUPIVACAINE HCL/0.9 % NACL/PF 0.1 %
2 PLASTIC BAG, INJECTION (ML) EPIDURAL ONCE
Status: COMPLETED | OUTPATIENT
Start: 2021-11-22 | End: 2021-11-22

## 2021-11-22 RX ADMIN — MIDAZOLAM HYDROCHLORIDE 2 MG: 1 INJECTION, SOLUTION INTRAMUSCULAR; INTRAVENOUS at 15:57

## 2021-11-22 RX ADMIN — ROCURONIUM BROMIDE 30 MG: 10 INJECTION INTRAVENOUS at 16:14

## 2021-11-22 RX ADMIN — FENTANYL CITRATE 50 MCG: 50 INJECTION INTRAMUSCULAR; INTRAVENOUS at 16:03

## 2021-11-22 RX ADMIN — NEOSTIGMINE METHYLSULFATE 1 MG: 0.5 INJECTION INTRAVENOUS at 17:19

## 2021-11-22 RX ADMIN — GLYCOPYRROLATE 0.2 MG: 0.2 INJECTION, SOLUTION INTRAMUSCULAR; INTRAVITREAL at 17:19

## 2021-11-22 RX ADMIN — SODIUM CHLORIDE, SODIUM GLUCONATE, SODIUM ACETATE, POTASSIUM CHLORIDE AND MAGNESIUM CHLORIDE 1000 ML: 526; 502; 368; 37; 30 INJECTION, SOLUTION INTRAVENOUS at 14:47

## 2021-11-22 RX ADMIN — DEXAMETHASONE SODIUM PHOSPHATE 4 MG: 4 INJECTION, SOLUTION INTRAMUSCULAR; INTRAVENOUS at 17:18

## 2021-11-22 RX ADMIN — DEXAMETHASONE SODIUM PHOSPHATE 4 MG: 4 INJECTION, SOLUTION INTRAMUSCULAR; INTRAVENOUS at 16:46

## 2021-11-22 RX ADMIN — LIDOCAINE HYDROCHLORIDE 80 MG: 20 INJECTION, SOLUTION INFILTRATION; PERINEURAL at 16:12

## 2021-11-22 RX ADMIN — ROPIVACAINE HYDROCHLORIDE 20 ML: 5 INJECTION, SOLUTION EPIDURAL; INFILTRATION; PERINEURAL at 16:11

## 2021-11-22 RX ADMIN — PHENYLEPHRINE HYDROCHLORIDE 50 MCG: 10 INJECTION INTRAVENOUS at 16:29

## 2021-11-22 RX ADMIN — Medication 30 MG: at 16:12

## 2021-11-22 RX ADMIN — EPHEDRINE SULFATE 10 MG: 50 INJECTION INTRAVENOUS at 16:32

## 2021-11-22 RX ADMIN — Medication 2 G: at 16:09

## 2021-11-22 RX ADMIN — PROPOFOL 100 MG: 10 INJECTION, EMULSION INTRAVENOUS at 16:12

## 2021-11-22 NOTE — ANESTHESIA PROCEDURE NOTES
Airway  Urgency: elective    Date/Time: 11/22/2021 4:17 PM  Airway not difficult    General Information and Staff    Patient location during procedure: OR  CRNA: Andres Ling CRNA    Indications and Patient Condition  Indications for airway management: airway protection    Preoxygenated: yes  Mask difficulty assessment: 2 - vent by mask + OA or adjuvant +/- NMBA    Final Airway Details  Final airway type: endotracheal airway      Successful airway: ETT  Cuffed: yes   Successful intubation technique: direct laryngoscopy  Facilitating devices/methods: intubating stylet and cricoid pressure  Endotracheal tube insertion site: oral  Blade: Laron  Blade size: 3  ETT size (mm): 7.0  Cormack-Lehane Classification: grade IIa - partial view of glottis  Placement verified by: chest auscultation, capnometry and palpation of cuff   Measured from: lips  ETT/EBT  to lips (cm): 21  Number of attempts at approach: 1  Assessment: lips, teeth, and gum same as pre-op and atraumatic intubation

## 2021-11-22 NOTE — DISCHARGE INSTRUCTIONS

## 2021-11-22 NOTE — ANESTHESIA PREPROCEDURE EVALUATION
Anesthesia Evaluation     history of anesthetic complications: PONV  NPO Solid Status: > 6 hours  NPO Liquid Status: > 2 hours           Airway   Mallampati: II  TM distance: >3 FB  Neck ROM: full  Possible difficult intubation  Dental    (+) upper dentures and poor dentition        Pulmonary - normal exam    breath sounds clear to auscultation  (+) a smoker (0.5 ppd) Current Smoked day of surgery, lung cancer,   (-) sleep apnea    ROS comment: Left lower lung lobe removed  snores  Cardiovascular   Exercise tolerance: good (4-7 METS)    ECG reviewed  Rhythm: regular  Rate: normal    (-) hypertension, valvular problems/murmurs, dysrhythmias, angina, murmur, cardiac stents, DVT, hyperlipidemia    ROS comment: Normal sinus rhythm  Nonspecific ST abnormality  Abnormal ECG  No previous ECGs available    Neuro/Psych  (+) headaches (sinus HA and occ migraine), psychiatric history Anxiety,     (-) seizures, TIA, CVA, weakness, numbness  GI/Hepatic/Renal/Endo    (+)  GERD well controlled,  hepatitis (treated 25 yrs ago) C, liver disease,   (-) no renal disease, diabetes, no thyroid disorder    Musculoskeletal     (+) arthralgias, back pain,       ROS comment: Right rotator cuff  Abdominal    Substance History   (-) alcohol use, drug use     OB/GYN    (-)  Pregnant        Other   arthritis,    history of cancer (lung adenocarcinoma)                    Anesthesia Plan    ASA 3     general with block   (Interscalene block discussed and patient agrees to proceed)  intravenous induction     Anesthetic plan, all risks, benefits, and alternatives have been provided, discussed and informed consent has been obtained with: patient and sibling.

## 2021-11-22 NOTE — OP NOTE
NAME: Alysia Deutsch     YOB: 1965    DATE OF SURGERY: 11/22/2021    PREOPERATIVE DIAGNOSIS:  Rotator cuff syndrome, right [M75.101]  Traumatic incomplete tear of right rotator cuff, subsequent encounter [S46.011D]  Adenocarcinoma of left lung (CMS/HCC) [C34.92]  Benign essential HTN [I10]  Panlobular emphysema (CMS/HCC) [J43.1]    POSTOPERATIVE DIAGNOSIS:  Post-Op Diagnosis Codes:     * Rotator cuff syndrome, right [M75.101]     * Traumatic incomplete tear of right rotator cuff, subsequent encounter [S46.011D]     * Adenocarcinoma of left lung (HCC) [C34.92]     * Benign essential HTN [I10]     * Panlobular emphysema (HCC) [J43.1]    PROCEDURE PERFORMED:      Procedure(s) (LRB):  ARTHROSCOPY OF THE RIGHT SHOULDER WITH SUBACROMIAL DECOMPRESSION, [place] PROCEDURE (Right)    SURGEON:  Dario Wilson MD    Assistant: Savita Chand CSA was responsible for performing the following activities: Retraction, Suction, Irrigation, Suturing, Closing and Placing Dressing and their skilled assistance was necessary for the success of this case.     Staff:    Circulator: Rex Benjamin Jr., RN  Scrub Person: Cricket Blackwell  Vendor Representative: Karen Jackson  Assistant: Savita Chand CSA    Anesthesia: General with Block     Estimated Blood Loss:  minimal    Specimens : None    Complications: none    Implants:  Nothing was implanted during the procedure    DESCRIPTION OF PROCEDURE:   Once consent was obtained, the patient was taken to the operating room. Once adequate anesthesia was obtained, then the patient was rolled in the lateral decubitus position, Right side up. All bony prominences were well padded, and an axillary roll was placed. The Right upper extremity was then manipulated through a full range of  motion without any difficulty. The Right upper extremity was then prepped and draped in the standard surgical fashion. The Right arm was then placed in the arthroscopic arm  jean-baptiste. The standard posterior portal was made, and the diagnostic portion of the arthroscopy began.    The intra-articular portion of the exam showed that the articular surface was in good condition without any sign of articular cartilage wear.. The biceps tendon showed a firm attachment onto the glenoid labrum. The rotator cuff was visualized and found to be intact.  No sign of undersurface tearing or partial-thickness tear was identified. There were no loose bodies in the intra-articular space or in the subacromial recess.      The subacromial space was then entered and there was noted to be a large subacromial spur on the anterior aspect. There also was noted to be significant inflammatory bursitis, which was resected using electrocautery and the suction shaver. Once the acromion and the clavicle were clearly identified, then the margaret was used to re-sect the subacromial spur and to turn the acromion into a type 1 acromion. This was done after having established a lateral portal using an 18-gauge spinal needle for localization and a small stab wound incision.      At this point, the anterior portal was also established using an 18-gauge spinal needle for localization and a small stab wound incision. The cannula was inserted and the distal clavicle resection was performed in the standard fashion resecting approximately 8-10 mm of bone.      The attention was returned to the rotator cuff which was found to be intact.  No significant bursal sided tear was identified.  There was a taut band of subacromial bursa that was approximately 1 cm from the rotator cuff insertion that might have stimulated a partial-thickness tear.  The rotator cuff insertion was tested with an 18-gauge spinal needle and had a good thickness.    The shaver was then allowed to run on suction to remove any remaining loose fragments. The arthroscopy was then terminated. The wounds were closed with interrupted nylon suture, covered with Xeroform  gauze, 4x4's, and Elastoplast dressing. The patient was then awakened and taken to the recovery room in good condition. The patient tolerated the procedure very well.      Dario Wilsno MD     Date: 11/22/2021  Time: 17:53 CST

## 2021-11-22 NOTE — ANESTHESIA PROCEDURE NOTES
Peripheral Block    Pre-sedation assessment completed: 11/22/2021 4:02 PM    Patient reassessed immediately prior to procedure    Patient location during procedure: OR  Start time: 11/22/2021 4:05 PM  Stop time: 11/22/2021 4:11 PM  Reason for block: at surgeon's request and post-op pain management  Performed by  Anesthesiologist: Duane Coe MD  Assisted by: Andres Ling CRNA  Preanesthetic Checklist  Completed: patient identified, IV checked, site marked, risks and benefits discussed, surgical consent, monitors and equipment checked, pre-op evaluation and timeout performed  Prep:  Pt Position: supine  Sterile barriers:cap, gloves, mask and washed/disinfected hands  Prep: ChloraPrep  Patient monitoring: blood pressure monitoring, continuous pulse oximetry and EKG  Procedure    Sedation: no  Performed under: MAC  Guidance:ultrasound guided    ULTRASOUND INTERPRETATION.  Using ultrasound guidance a 22 G gauge needle was placed in close proximity to the brachial plexus nerve, at which point, under ultrasound guidance anesthetic was injected in the area of the nerve and spread of the anesthesia was seen on ultrasound in close proximity thereto.  There were no abnormalities seen on ultrasound; a digital image was taken; and the patient tolerated the procedure with no complications. Images:still images obtained, printed/placed on chart    Laterality:right  Block Type:interscalene  Injection Technique:single-shot  Needle Type:echogenic  Needle Gauge:22 G      Medications Used: ropivacaine (NAROPIN) 0.5 % injection, 20 mL  Med administered at 11/22/2021 4:11 PM      Medications  Comment:Pt ID'd  Ultrasound guided  Needle seen throughout  Local infiltration appropriate    Post Assessment  Injection Assessment: negative aspiration for heme, no paresthesia on injection and incremental injection  Patient Tolerance:comfortable throughout block  Complications:no

## 2021-11-22 NOTE — ANESTHESIA POSTPROCEDURE EVALUATION
Patient: Alysia Deutsch    Procedure Summary     Date: 11/22/21 Room / Location: [facility] OR 11 / [facility] OR    Anesthesia Start: 1559 Anesthesia Stop: 1730    Procedure: ARTHROSCOPY OF THE RIGHT SHOULDER WITH SUBACROMIAL DECOMPRESSION, CRISTAL PROCEDURE (Right Shoulder) Diagnosis:       Rotator cuff syndrome, right      Traumatic incomplete tear of right rotator cuff, subsequent encounter      Adenocarcinoma of left lung (HCC)      Benign essential HTN      Panlobular emphysema (HCC)      (Rotator cuff syndrome, right [M75.101])      (Traumatic incomplete tear of right rotator cuff, subsequent encounter [S46.011D])      (Adenocarcinoma of left lung (CMS/HCC) [C34.92])      (Benign essential HTN [I10])      (Panlobular emphysema (CMS/HCC) [J43.1])    Surgeons: Dario Wilson MD Provider: Duane Coe MD    Anesthesia Type: general with block ASA Status: 3          Anesthesia Type: general with block    Vitals  No vitals data found for the desired time range.          Post Anesthesia Care and Evaluation    Patient location during evaluation: PACU  Patient participation: waiting for patient participation  Level of consciousness: responsive to verbal stimuli  Pain management: adequate  Airway patency: patent  Anesthetic complications: No anesthetic complications    Cardiovascular status: acceptable  Respiratory status: acceptable and face mask  Hydration status: acceptable    Comments: ---------------------------               11/22/21 1733         ---------------------------   BP:          103/58       Pulse:         58           Resp:          18           Temp:   97.5 °F (36.4 °C)   SpO2:          99%         ---------------------------

## 2021-11-24 ENCOUNTER — HOSPITAL ENCOUNTER (OUTPATIENT)
Dept: PHYSICAL THERAPY | Facility: HOSPITAL | Age: 56
Setting detail: THERAPIES SERIES
Discharge: HOME | End: 2021-11-24

## 2021-11-24 DIAGNOSIS — M75.111 NONTRAUMATIC INCOMPLETE TEAR OF RIGHT ROTATOR CUFF: ICD-10-CM

## 2021-11-24 DIAGNOSIS — M75.101 ROTATOR CUFF SYNDROME OF RIGHT SHOULDER: Primary | ICD-10-CM

## 2021-11-24 PROCEDURE — 97162 PT EVAL MOD COMPLEX 30 MIN: CPT | Performed by: PHYSICAL THERAPIST
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
## 2021-11-29 ENCOUNTER — HOSPITAL ENCOUNTER (OUTPATIENT)
Dept: PHYSICAL THERAPY | Facility: HOSPITAL | Age: 56
Setting detail: THERAPIES SERIES
Discharge: HOME | End: 2021-11-29

## 2021-11-29 DIAGNOSIS — M75.111 NONTRAUMATIC INCOMPLETE TEAR OF RIGHT ROTATOR CUFF: ICD-10-CM

## 2021-11-29 DIAGNOSIS — M75.101 ROTATOR CUFF SYNDROME OF RIGHT SHOULDER: Primary | ICD-10-CM

## 2021-11-29 PROCEDURE — 97110 THERAPEUTIC EXERCISES: CPT

## 2021-11-29 NOTE — THERAPY TREATMENT NOTE
Outpatient Physical Therapy Ortho Treatment Note  [facility]     Patient Name: Alysia Deutsch  : 1965  MRN: [id number]  Today's Date: 2021      Visit Date: 2021     Subjective Improvement some  Visits 2/2  Visit approved 30  RTMD 2021  Recert Date 12-    S/P R subacromial decompression and distal clavicle resection on 2021  Post op 1 week    Visit Dx:    ICD-10-CM ICD-9-CM   1. Rotator cuff syndrome of right shoulder  M75.101 726.10   2. Nontraumatic incomplete tear of right rotator cuff  M75.111 726.13       Patient Active Problem List   Diagnosis   • Idiopathic osteoporosis   • Solitary pulmonary nodule   • Adenocarcinoma of left lung (HCC)   • Panlobular emphysema (HCC)   • Mixed hyperlipidemia   • Benign essential HTN   • Generalized abdominal pain   • Other constipation   • Gastroesophageal reflux disease   • Chronic low back pain   • Rotator cuff syndrome, right   • Right hip pain   • Tear of right rotator cuff   • Lateral epicondylitis, right elbow   • Lumbar degenerative disc disease   • Nicotine dependence, cigarettes, with other nicotine-induced disorders        Past Medical History:   Diagnosis Date   • Anxiety    • Back pain    • Cancer (HCC)     lung   • Hepatitis    • Lung mass    • PONV (postoperative nausea and vomiting)    • Shoulder pain, right         Past Surgical History:   Procedure Laterality Date   • BREAST AUGMENTATION     • COLONOSCOPY N/A 10/27/2020    Procedure: COLONOSCOPY;  Surgeon: Carlos Enrique Alcantara MD;  Location: [facility] ENDOSCOPY;  Service: Gastroenterology;  Laterality: N/A;   • ENDOSCOPY N/A 10/27/2020    Procedure: ESOPHAGOGASTRODUODENOSCOPY;  Surgeon: Carlos Enrique Alcantara MD;  Location: [facility] ENDOSCOPY;  Service: Gastroenterology;  Laterality: N/A;   • LAPAROSCOPIC TUBAL LIGATION     • LUNG LOBECTOMY Left 2020    LOWER   • SHOULDER ARTHROSCOPY Right 2021    Procedure: ARTHROSCOPY OF THE RIGHT SHOULDER WITH SUBACROMIAL  DECOMPRESSION, CRISTAL PROCEDURE;  Surgeon: Dario Wilson MD;  Location: [facility];  Service: Orthopedics;  Laterality: Right;   • SPINAL FUSION Right 06/07/2021    R SI joint fusion   • THORACOSCOPY Left 8/20/2020    Procedure: LEFT THORACOSCOPY VIDEO ASSISTED THORACOTOMY pulmonary resection thoracic mediastinal lymphadenectomy bronchoscopy;  Surgeon: Horace Christiansen MD;  Location: [facility];  Service: Cardiothoracic;  Laterality: Left;  Bronch [phone]  VATS [phone]        PT Ortho     Row Name 11/29/21 1300       Precautions and Contraindications    Precautions/Limitations other (see comments)   per MD order: PROM then AAROM then AROM. progress slowly no real restrictions.  -CP    Precautions R SAD/Cristal 11/22/21   -CP    Contraindications L lobectomy due to lung cancer August 2020   -CP       Subjective Pain    Able to rate subjective pain? yes  -CP    Pre-Treatment Pain Level 3  -CP    Post-Treatment Pain Level 4  -CP       Posture/Observations    Posture/Observations Comments Presents this date wearing sling on R UE. 3 sutured arthroscopy portals R shoulder, no signs of infection at this time.  -CP          User Key  (r) = Recorded By, (t) = Taken By, (c) = Cosigned By    Initials Name Provider Type    Ute Iverson PTA Physical Therapy Assistant                             PT Assessment/Plan     Row Name 11/29/21 1422          PT Assessment    Assessment Comments Paient was very guarded and reported increase pain with all motions during PROM.  Patient not wearing her sling today.  Patient was advised to start using her sling for pain control.  -CP            PT Plan    PT Frequency 2x/week  -CP     Predicted Duration of Therapy Intervention (PT) 4-6 weeks  -CP     PT Plan Comments Cont with POC.  possible suture removal,  add scap squeezes with UE supported  -CP           User Key  (r) = Recorded By, (t) = Taken By, (c) = Cosigned By    Initials Name Provider Type    JOSE GUADALUPE Dominguez  Ute HERRERA PTA Physical Therapy Assistant                 Modalities     Row Name 11/29/21 1300             Subjective Comments    Subjective Comments Patient states that  -CP              Ice    Ice Applied Yes  -CP      Location right shoulder  -CP      PT Ice Rx Minutes 10  -CP      Ice S/P Rx Yes  -CP            User Key  (r) = Recorded By, (t) = Taken By, (c) = Cosigned By    Initials Name Provider Type    Ute Iverson PTA Physical Therapy Assistant               OP Exercises     Row Name 11/29/21 1424 11/29/21 1400 11/29/21 1300       Subjective Comments    Subjective Comments -- -- Patient states that  -CP       Subjective Pain    Able to rate subjective pain? -- -- yes  -CP    Pre-Treatment Pain Level -- -- 3  -CP    Post-Treatment Pain Level -- -- 4  -CP       Total Minutes    76803 - PT Therapeutic Exercise Minutes 30  -CP -- --       Exercise 1    Exercise Name 1 -- PROM  -CP --    Time 1 -- 30  -CP --    Additional Comments -- IR/ER at side to neutral  -CP --          User Key  (r) = Recorded By, (t) = Taken By, (c) = Cosigned By    Initials Name Provider Type    Ute Iverson PTA Physical Therapy Assistant                              PT OP Goals     Row Name 11/29/21 1400          PT Short Term Goals    STG Date to Achieve 12/15/21  -CP     STG 1 Note a >/= 50% subjective improvement.  -CP     STG 1 Progress Not Met  -CP     STG 2 R shoulder PROM WNLs.  -CP     STG 2 Progress Progressing  -CP     STG 3 R shoulder active flexion and abduction to be >/= 90 deg each.  -CP     STG 3 Progress Progressing  -CP            Long Term Goals    LTG Date to Achieve 01/05/22  -CP     LTG 1 Independent with HEP.  -CP     LTG 1 Progress Ongoing  -CP     LTG 2 R shoulder AROM WFLs.  -CP     LTG 2 Progress Progressing  -CP     LTG 3 QuickDASH score to be </= 20.  -CP     LTG 3 Progress Not Met  -CP     LTG 4 Complete ADLs and IADLs with minimal discomfort.  -CP     LTG 4 Progress Progressing  -CP             Time Calculation    PT Goal Re-Cert Due Date 12/15/21  -CP           User Key  (r) = Recorded By, (t) = Taken By, (c) = Cosigned By    Initials Name Provider Type    CP Ute Dominguez, AMADO Physical Therapy Assistant                               Time Calculation:   Start Time: 1300  Stop Time: 1340  Time Calculation (min): 40 min  Total Timed Code Minutes- PT: 30 minute(s)  Timed Charges  50011 - PT Therapeutic Exercise Minutes: 30  Untimed Charges  PT Ice Rx Minutes: 10  Total Minutes  Timed Charges Total Minutes: 30  Untimed Charges Total Minutes: 10   Total Minutes: 30  Therapy Charges for Today     Code Description Service Date Service Provider Modifiers Qty    36786025241 HC PT THER SUPP EA 15 MIN 11/29/2021 Ute Dominguez, AMADO GP 1    64732276289 HC PT THER PROC EA 15 MIN 11/29/2021 Ute Dominguez PTA GP 2                    Ute Dominguez PTA  11/29/2021

## 2021-12-03 ENCOUNTER — HOSPITAL ENCOUNTER (OUTPATIENT)
Dept: PHYSICAL THERAPY | Facility: HOSPITAL | Age: 56
Setting detail: THERAPIES SERIES
Discharge: HOME | End: 2021-12-03

## 2021-12-03 ENCOUNTER — TELEPHONE (OUTPATIENT)
Dept: ORTHOPEDIC SURGERY | Facility: CLINIC | Age: 56
End: 2021-12-03

## 2021-12-03 DIAGNOSIS — M75.101 ROTATOR CUFF SYNDROME OF RIGHT SHOULDER: Primary | ICD-10-CM

## 2021-12-03 DIAGNOSIS — M75.111 NONTRAUMATIC INCOMPLETE TEAR OF RIGHT ROTATOR CUFF: ICD-10-CM

## 2021-12-03 PROCEDURE — 97110 THERAPEUTIC EXERCISES: CPT

## 2021-12-03 PROCEDURE — 97140 MANUAL THERAPY 1/> REGIONS: CPT

## 2021-12-03 NOTE — THERAPY TREATMENT NOTE
"    Outpatient Physical Therapy Ortho Treatment Note  [facility]     Patient Name: Alysia Deutsch  : 1965  MRN: [id number]  Today's Date: 12/3/2021      Visit Date: 2021   Attendance: 3/3 (30/yr)  Subjective Improvement: \"It's moving better\"  Next MD Appt: 21  Recert Date: 12/15/21     Therapy Diagnosis: R subacromial decompression and distal clavicle resection 21    Visit Dx:    ICD-10-CM ICD-9-CM   1. Rotator cuff syndrome of right shoulder  M75.101 726.10   2. Nontraumatic incomplete tear of right rotator cuff  M75.111 726.13       Patient Active Problem List   Diagnosis   • Idiopathic osteoporosis   • Solitary pulmonary nodule   • Adenocarcinoma of left lung (HCC)   • Panlobular emphysema (HCC)   • Mixed hyperlipidemia   • Benign essential HTN   • Generalized abdominal pain   • Other constipation   • Gastroesophageal reflux disease   • Chronic low back pain   • Rotator cuff syndrome, right   • Right hip pain   • Tear of right rotator cuff   • Lateral epicondylitis, right elbow   • Lumbar degenerative disc disease   • Nicotine dependence, cigarettes, with other nicotine-induced disorders        Past Medical History:   Diagnosis Date   • Anxiety    • Back pain    • Cancer (HCC)     lung   • Hepatitis    • Lung mass    • PONV (postoperative nausea and vomiting)    • Shoulder pain, right         Past Surgical History:   Procedure Laterality Date   • BREAST AUGMENTATION     • COLONOSCOPY N/A 10/27/2020    Procedure: COLONOSCOPY;  Surgeon: Carlos Enrique Alcantara MD;  Location: [facility] ENDOSCOPY;  Service: Gastroenterology;  Laterality: N/A;   • ENDOSCOPY N/A 10/27/2020    Procedure: ESOPHAGOGASTRODUODENOSCOPY;  Surgeon: Carlos Enrique Alcantara MD;  Location: [facility] ENDOSCOPY;  Service: Gastroenterology;  Laterality: N/A;   • LAPAROSCOPIC TUBAL LIGATION     • LUNG LOBECTOMY Left 2020    LOWER   • SHOULDER ARTHROSCOPY Right 2021    Procedure: ARTHROSCOPY OF THE RIGHT SHOULDER WITH " SUBACROMIAL DECOMPRESSION, CRISTAL PROCEDURE;  Surgeon: Dario Wilson MD;  Location: [facility];  Service: Orthopedics;  Laterality: Right;   • SPINAL FUSION Right 06/07/2021    R SI joint fusion   • THORACOSCOPY Left 8/20/2020    Procedure: LEFT THORACOSCOPY VIDEO ASSISTED THORACOTOMY pulmonary resection thoracic mediastinal lymphadenectomy bronchoscopy;  Surgeon: Horace Christiansen MD;  Location: [facility];  Service: Cardiothoracic;  Laterality: Left;  [place] [phone]  VATS [phone]        PT Ortho     Row Name 12/03/21 0900       Subjective Comments    Subjective Comments Pt stated that shoulder is more annoying and irritating than painful. She reports that he stitches are starting to get tight and irritating. Pt requested for sutures to be removed.  -       Precautions and Contraindications    Precautions/Limitations other (see comments)  per MD order: PROM then AAROM then AROM. progress slowly no real restrictions.  -    Precautions R SAD/Cristal 11/22/21  -    Contraindications L lobectomy due to lung cancer August 2020  -       Subjective Pain    Able to rate subjective pain? yes  -    Pre-Treatment Pain Level 3  -    Post-Treatment Pain Level 4  -       Posture/Observations    Posture/Observations Comments Pt arrived without sling. Incisions are clean and dry. Sutures are mildly tight. TTP ant shoulder  -       Right Upper Ext    Rt Shoulder Abduction PROM 90 deg  -    Rt Shoulder Flexion PROM 86 deg  -          User Key  (r) = Recorded By, (t) = Taken By, (c) = Cosigned By    Initials Name Provider Type     Amy Novak, PT Physical Therapist                             PT Assessment/Plan     Row Name 12/03/21 0900          PT Assessment    Functional Limitations Limitation in home management; Limitations in functional capacity and performance; Limitations in community activities; Performance in leisure activities; Performance in self-care ADL; Performance in work  activities  -     Impairments Integumentary integrity; Joint mobility; Pain; Range of motion  -     Assessment Comments Added scap retraction and pendulums this date. Pt did extremely well with both exercises and reported that the pendulums felt really good. Cont with PROM with improved range. Sutures were not removed this date. Pt requested for removal due to being 11 days post-op and sutures have become uncomfortable. No suture kits in the clinic this date. Dr. Wilson office was called who stated pt would be fine waiting until her follow up appt on Monday and that the sutures would be removed in house. I informed pt of this and she stated that would be fine.  -     Rehab Potential Good  -     Patient/caregiver participated in establishment of treatment plan and goals Yes  -     Patient would benefit from skilled therapy intervention Yes  -            PT Plan    PT Frequency 2x/week  -     Predicted Duration of Therapy Intervention (PT) 4-6 weeks  -     PT Plan Comments Cont with POC. May try pulleys next vist.  -           User Key  (r) = Recorded By, (t) = Taken By, (c) = Cosigned By    Initials Name Provider Type     Amy Novak, PT Physical Therapist                   OP Exercises     Row Name 12/03/21 0900             Subjective Comments    Subjective Comments Pt stated that shoulder is more annoying and irritating than painful. She reports that he stitches are starting to get tight and irritating. Pt requested for sutures to be removed.  -              Subjective Pain    Able to rate subjective pain? yes  -      Pre-Treatment Pain Level 3  -      Post-Treatment Pain Level 4  -              Total Minutes    12815 - PT Therapeutic Exercise Minutes 10  -      03322 - PT Manual Therapy Minutes 30  -              Exercise 1    Exercise Name 1 Supported scap retraction  -      Cueing 1 Verbal; Tactile; Demo  -      Sets 1 2  -      Reps 1 10  -      Time 1 5 sec hold  -               Exercise 2    Exercise Name 2 PROM  -MH      Additional Comments See manual  -MH              Exercise 3    Exercise Name 3 Pendulums fw/be, side/side  -MH      Cueing 3 Verbal; Demo  -MH      Sets 3 1  -MH      Reps 3 20 each  -            User Key  (r) = Recorded By, (t) = Taken By, (c) = Cosigned By    Initials Name Provider Type     Amy Novak, PT Physical Therapist                         Manual Rx (last 36 hours)     Manual Treatments     Row Name 12/03/21 0900             Total Minutes    86085 - PT Manual Therapy Minutes 30  -              Manual Rx 1    Manual Rx 1 Location Right shoulder  -      Manual Rx 1 Type PROM  -MH      Manual Rx 1 Duration 30  -MH            User Key  (r) = Recorded By, (t) = Taken By, (c) = Cosigned By    Initials Name Provider Type     Amy Novak, PT Physical Therapist                 PT OP Goals     Row Name 12/03/21 0900          PT Short Term Goals    STG Date to Achieve 12/15/21  -     STG 1 Note a >/= 50% subjective improvement.  -     STG 1 Progress Not Met  -     STG 2 R shoulder PROM WNLs.  -     STG 2 Progress Progressing  -     STG 3 R shoulder active flexion and abduction to be >/= 90 deg each.  -     STG 3 Progress Progressing  -            Long Term Goals    LTG Date to Achieve 01/05/22  -     LTG 1 Independent with HEP.  -     LTG 1 Progress Ongoing  -     LTG 2 R shoulder AROM WFLs.  -     LTG 2 Progress Progressing  -     LTG 3 QuickDASH score to be </= 20.  -     LTG 3 Progress Not Met  -     LTG 4 Complete ADLs and IADLs with minimal discomfort.  -     LTG 4 Progress Progressing  -            Time Calculation    PT Goal Re-Cert Due Date 12/15/21  -           User Key  (r) = Recorded By, (t) = Taken By, (c) = Cosigned By    Initials Name Provider Type     Amy Novak, PT Physical Therapist                Therapy Education  Education Details: HEP: pendulums  Given: HEP  Program: New  How Provided:  Verbal, Demonstration  Provided to: Patient  Level of Understanding: Verbalized, Demonstrated              Time Calculation:   Start Time: 0930  Stop Time: 1010  Time Calculation (min): 40 min  Timed Charges  62400 - PT Therapeutic Exercise Minutes: 10  33448 - PT Manual Therapy Minutes: 30  Total Minutes  Timed Charges Total Minutes: 40   Total Minutes: 40  Therapy Charges for Today     Code Description Service Date Service Provider Modifiers Qty    42408954020 HC PT THER PROC EA 15 MIN 12/3/2021 Amy Novak, PT GP 1    11784313054 HC PT MANUAL THERAPY EA 15 MIN 12/3/2021 Amy Novak, PT GP 2                    Amy Novak, PT  12/3/2021

## 2021-12-03 NOTE — TELEPHONE ENCOUNTER
SOY NGUYEN FROM [facility] CALLED TO SAY THAT THEY WERE GOING TO REMOVE PT SUTURES TODAY BUT WERE OUT OF SUTURE REMOVAL KITS. SHE WANTED TO KNOW IF THEY SHOULD SEND HER OVER HERE TODAY OR WOULD THE PATIENT BE OKAY TO WAIT UNTIL Monday WHEN SHE HAS HER POST OP APPT.     WENDY'S CALL BACK NUMBER [phone].

## 2021-12-06 ENCOUNTER — OFFICE VISIT (OUTPATIENT)
Dept: ORTHOPEDIC SURGERY | Facility: CLINIC | Age: 56
End: 2021-12-06

## 2021-12-06 ENCOUNTER — HOSPITAL ENCOUNTER (OUTPATIENT)
Dept: PHYSICAL THERAPY | Facility: HOSPITAL | Age: 56
Setting detail: THERAPIES SERIES
Discharge: HOME | End: 2021-12-06

## 2021-12-06 VITALS — HEIGHT: 63 IN | BODY MASS INDEX: 22.15 KG/M2 | WEIGHT: 125 LBS

## 2021-12-06 DIAGNOSIS — M75.101 ROTATOR CUFF SYNDROME OF RIGHT SHOULDER: Primary | ICD-10-CM

## 2021-12-06 DIAGNOSIS — Z98.890 S/P ARTHROSCOPY OF RIGHT SHOULDER: Primary | ICD-10-CM

## 2021-12-06 DIAGNOSIS — M75.111 NONTRAUMATIC INCOMPLETE TEAR OF RIGHT ROTATOR CUFF: ICD-10-CM

## 2021-12-06 PROCEDURE — 99024 POSTOP FOLLOW-UP VISIT: CPT | Performed by: NURSE PRACTITIONER

## 2021-12-06 PROCEDURE — 97110 THERAPEUTIC EXERCISES: CPT | Performed by: PHYSICAL THERAPIST

## 2021-12-06 NOTE — PROGRESS NOTES
"Alysia Deutsch is a 56 y.o. female is s/p       Chief Complaint   Patient presents with   • Right Shoulder - Post-op       HISTORY OF PRESENT ILLNESS: This 56-year-old female patient presents today for 2-week follow-up status post right shoulder arthroscopy with subacromial decompression.  This procedure was performed by Dr. Wilson on 11/22/2021.  The patient has no unusual complaints this visit and states that her pain has improved since the procedure. She denies numbness or tingling. ROM improved since procedure. The patient is no longer wearing sling and has attended 4 sessions of PT. the patient's son removed her sutures on Friday, \"they were rubbing me and irritating me so I had my son remove them.\"      11/22/21 (2w) Dario Wilson MD    Arthroscopy Of The Right Shoulder With Subacromial Decompression, [place] Procedure - Right           Allergies   Allergen Reactions   • Codeine Hives and Itching   • Morphine Nausea And Vomiting     Severe vomiting   • Adhesive Tape Rash         Current Outpatient Medications:   •  acetaminophen (TYLENOL) 500 MG tablet, Take 1,000 mg by mouth Every 6 (Six) Hours As Needed for Mild Pain ., Disp: , Rfl:   •  FLUoxetine (PROzac) 10 MG tablet, Take 10 mg by mouth Daily., Disp: , Rfl:   •  fluticasone (FLONASE) 50 MCG/ACT nasal spray, 1 spray into the nostril(s) as directed by provider Daily., Disp: , Rfl:   •  ibuprofen (ADVIL,MOTRIN) 200 MG tablet, Take 400 mg by mouth Every 6 (Six) Hours As Needed for Mild Pain ., Disp: , Rfl:   •  Loratadine 10 MG capsule, Take 10 mg by mouth Daily., Disp: , Rfl:   •  ondansetron (Zofran) 4 MG tablet, Take 2 tablets by mouth Every 8 (Eight) Hours As Needed for Nausea or Vomiting., Disp: 30 tablet, Rfl: 0  •  oxyCODONE-acetaminophen (PERCOCET) 7.5-325 MG per tablet, Take 1 tablet by mouth Every 4 (Four) Hours As Needed for Moderate Pain., Disp: 30 tablet, Rfl: 0  •  temazepam (RESTORIL) 15 MG capsule, Take 15-30 mg by mouth Every " Night., Disp: , Rfl:     No fevers or chills.  No nausea or vomiting.      PHYSICAL EXAMINATION:       Alysia Deutsch is a 56 y.o. female    Patient is awake and alert, answers questions appropriately and is in no apparent distress.    GAIT:     []  Normal  []  Antalgic    Assistive device: []  None  []  Walker     []  Crutches  []  Cane     []  Wheelchair  []  Stretcher    Right Shoulder Exam     Tenderness   The patient is experiencing tenderness in the acromioclavicular joint and acromion.    Range of Motion   Active abduction: 100   Forward flexion: 160     Tests   Impingement: negative    Other   Erythema: absent  Scars: present  Sensation: normal  Pulse: present    Comments:  Surgical incisions healed.  Incisions are well approximated and intact.  No erythema or drainage noted.  No instability noted.                   ASSESSMENT:    Diagnoses and all orders for this visit:    S/P arthroscopy of right shoulder        PLAN:  1.  The patient has no real restrictions.  Discussed patient's results and recommended she continue to attend physical therapy to improve her range of motion, strength and flexibility.  Patient verbalizes understanding.  2.  Patient does not need to wear her sling only for comfort.  Patient verbalizes understanding.  Encourage patient to rest, ice and elevate as needed to enhance healing.  3.  Provided patient with work note, instructed not to return to work until released from orthopedics.  Patient's next appointment will be in 4 weeks.  4.  Instructed patient to follow-up in 4 weeks or sooner as needed.    All questions and concerns are addressed with understanding noted. They are aware and are in agreement to this plan.      Return in about 4 weeks (around 1/3/2022) for Recheck.    MICHAEL Gates    This document has been electronically signed by MICHAEL Gates on December 6, 2021 14:38 CST      EMR Dragon/Transciption Disclaimer: Some of this note may be an  electronic transcription/translation of spoken language to printed text.  The electronic translation of spoken language may permit erroneous, or at times, nonsensical words or phrases to be inadvertently transcribed. Although I have reviewed the note for such errors, some may still exist.

## 2021-12-06 NOTE — THERAPY TREATMENT NOTE
Outpatient Physical Therapy Ortho Treatment Note  [facility]     Patient Name: Alysia Deutsch  : 1965  MRN: [id number]  Today's Date: 2021      Visit Date: 2021    Attendance: 4/4 (30/yr)  Subjective Improvement: 60%  Next MD Appt: 21  Recert Date: 12/15/21     Therapy Diagnosis: R subacromial decompression and distal clavicle resection 21    Visit Dx:    ICD-10-CM ICD-9-CM   1. Rotator cuff syndrome of right shoulder  M75.101 726.10   2. Nontraumatic incomplete tear of right rotator cuff  M75.111 726.13            Past Medical History:   Diagnosis Date   • Anxiety    • Back pain    • Cancer (HCC)     lung   • Hepatitis    • Lung mass    • PONV (postoperative nausea and vomiting)    • Shoulder pain, right         Past Surgical History:   Procedure Laterality Date   • BREAST AUGMENTATION     • COLONOSCOPY N/A 10/27/2020    Procedure: COLONOSCOPY;  Surgeon: Carlos Enrique Alcantara MD;  Location: [facility] ENDOSCOPY;  Service: Gastroenterology;  Laterality: N/A;   • ENDOSCOPY N/A 10/27/2020    Procedure: ESOPHAGOGASTRODUODENOSCOPY;  Surgeon: Carlos Enrique Alcantara MD;  Location: [facility] ENDOSCOPY;  Service: Gastroenterology;  Laterality: N/A;   • LAPAROSCOPIC TUBAL LIGATION     • LUNG LOBECTOMY Left 2020    LOWER   • SHOULDER ARTHROSCOPY Right 2021    Procedure: ARTHROSCOPY OF THE RIGHT SHOULDER WITH SUBACROMIAL DECOMPRESSION, CRISTAL PROCEDURE;  Surgeon: Dario Wilson MD;  Location: [facility] OR;  Service: Orthopedics;  Laterality: Right;   • SPINAL FUSION Right 2021    R SI joint fusion   • THORACOSCOPY Left 2020    Procedure: LEFT THORACOSCOPY VIDEO ASSISTED THORACOTOMY pulmonary resection thoracic mediastinal lymphadenectomy bronchoscopy;  Surgeon: Horace Christiansen MD;  Location: [facility] OR;  Service: Cardiothoracic;  Laterality: Left;  [place] [phone]  VATS [phone]        PT Ortho     Row Name 21 1100       Subjective Comments    Subjective  "Comments Patient reports that her shoulder is \"achy and sore now, but not what I call really painful.\" Shoulder will still hurt if she does \"something silly.\" Sleeping in the bed now, but still wakes up during the night. She had her son remove her sutures on 12/4/21. Shoulder started feeling significantly better once sutures removed. 60% subjective improvement. Movement coming along slowly.  -       Precautions and Contraindications    Precautions R SAD/Juana 11/22/21  -    Contraindications L lobectomy due to lung cancer August 2020  -       Subjective Pain    Able to rate subjective pain? yes  -    Pre-Treatment Pain Level 3  -    Post-Treatment Pain Level 4  -    Subjective Pain Comment declines ice  -       Posture/Observations    Posture/Observations Comments Presents without sling. Sutures removed since last therapy session.  -SS       Right Upper Ext    Rt Shoulder Abduction AROM 59 deg  -SS    Rt Shoulder Abduction PROM 123 deg  -SS    Rt Shoulder Extension AROM 43 deg  -SS    Rt Shoulder Flexion AROM 80 deg  -SS    Rt Shoulder Flexion PROM 122 deg  -SS    Rt Shoulder External Rotation AROM 34 deg with arm at side  -    Rt Shoulder External Rotation PROM 60 deg in supine with arm at side; 63 deg in supine with shoulder abducted 60 deg  -SS    Rt Shoulder Internal Rotation PROM 50 deg in supine with shoulder abducted 60 deg  -SS          User Key  (r) = Recorded By, (t) = Taken By, (c) = Cosigned By    Initials Name Provider Type    Puma Lopez, PT DPT Physical Therapist                             PT Assessment/Plan     Row Name 12/06/21 1100          PT Assessment    Functional Limitations Limitation in home management; Limitations in functional capacity and performance; Limitations in community activities; Performance in leisure activities; Performance in self-care ADL; Performance in work activities  -     Impairments Integumentary integrity; Joint mobility; Pain; Range " "of motion  -     Assessment Comments Improved ROM. Continues to be guarded. Rehabilitation Progress Note faxed to MD office.  -SS     Rehab Potential Good  -SS     Patient/caregiver participated in establishment of treatment plan and goals Yes  -SS     Patient would benefit from skilled therapy intervention Yes  -SS            PT Plan    PT Frequency 2x/week  -SS     Predicted Duration of Therapy Intervention (PT) 4-6 weeks  -SS     PT Plan Comments Continue progressive ROM. Progress as tolerated. Start gentle, submaximal shoulder isometrics next.  -           User Key  (r) = Recorded By, (t) = Taken By, (c) = Cosigned By    Initials Name Provider Type    SS Puma Lauren, PT DPT Physical Therapist                   OP Exercises     Row Name 12/06/21 1100             Subjective Comments    Subjective Comments Patient reports that her shoulder is \"achy and sore now, but not what I call really painful.\" Shoulder will still hurt if she does \"something silly.\" Sleeping in the bed now, but still wakes up during the night. She had her son remove her sutures on 12/4/21. Shoulder started feeling significantly better once sutures removed. 60% subjective improvement. Movement coming along slowly.  -SS              Subjective Pain    Able to rate subjective pain? yes  -SS      Pre-Treatment Pain Level 3  -SS      Post-Treatment Pain Level 4  -SS      Subjective Pain Comment declines ice  -SS              Exercise 1    Exercise Name 1 check ROM  -SS              Exercise 2    Exercise Name 2 Pro2, Seat 6, U/LE, F/R, ROM  -SS      Cueing 2 Verbal  -SS      Time 2 8 mins  -SS      Additional Comments Level 1  -SS              Exercise 3    Exercise Name 3 Pulley flexion/scaption  -SS      Cueing 3 Verbal; Demo  -SS      Sets 3 1  -SS      Reps 3 20 each  -SS              Exercise 4    Exercise Name 4 Supine clasped hand shoulder flexion  -SS      Cueing 4 Verbal; Demo  -SS      Sets 4 1  -SS      Reps 4 30  -SS       "        Exercise 5    Exercise Name 5 Supine shoulder flexion AROM  -SS      Cueing 5 Verbal; Demo  -SS      Sets 5 1  -SS      Reps 5 10  -SS              Exercise 6    Exercise Name 6 Supine abduction AROM  -SS      Cueing 6 Verbal; Demo  -SS      Sets 6 1  -SS      Reps 6 15  -SS              Exercise 7    Exercise Name 7 PROM R shoulder -- flexion, abduction, ER with arm at side, ER with shoulder in 60 deg abduction, IR with shoulder in 60 deg abduction  -SS      Cueing 7 Tactile  -SS      Reps 7 20 each  -            User Key  (r) = Recorded By, (t) = Taken By, (c) = Cosigned By    Initials Name Provider Type     Puma Lauren, PT DPT Physical Therapist                              PT OP Goals     Row Name 12/06/21 1100          PT Short Term Goals    STG Date to Achieve 12/15/21  -     STG 1 Note a >/= 50% subjective improvement.  -     STG 1 Progress Met  -     STG 2 R shoulder PROM WNLs.  -     STG 2 Progress Not Met; Ongoing  -     STG 3 R shoulder active flexion and abduction to be >/= 90 deg each.  -     STG 3 Progress Not Met; Ongoing  -            Long Term Goals    LTG Date to Achieve 01/05/22  -     LTG 1 Independent with HEP.  -     LTG 1 Progress Ongoing  -     LTG 2 R shoulder AROM WFLs.  -     LTG 2 Progress Progressing  -     LTG 3 QuickDASH score to be </= 20.  -     LTG 3 Progress Not Met  -     LTG 4 Complete ADLs and IADLs with minimal discomfort.  -     LTG 4 Progress Progressing  -            Time Calculation    PT Goal Re-Cert Due Date 12/15/21  -           User Key  (r) = Recorded By, (t) = Taken By, (c) = Cosigned By    Initials Name Provider Type    Puma Lopez, PT DPT Physical Therapist                Therapy Education  Given: HEP  Program: Reinforced  How Provided: Verbal  Provided to: Patient  Level of Understanding: Verbalized              Time Calculation:   Start Time: 1059  Therapy Charges for Today     Code Description  Service Date Service Provider Modifiers Qty    29475438827 HC PT THER PROC EA 15 MIN 12/6/2021 Puma Lauren, PT DPT GP 3                    Puma Lauren, PT, DPT, CHT  12/6/2021

## 2021-12-10 ENCOUNTER — APPOINTMENT (OUTPATIENT)
Dept: PHYSICAL THERAPY | Facility: HOSPITAL | Age: 56
End: 2021-12-10

## 2021-12-10 PROCEDURE — U0004 COV-19 TEST NON-CDC HGH THRU: HCPCS | Performed by: NURSE PRACTITIONER

## 2021-12-13 ENCOUNTER — HOSPITAL ENCOUNTER (OUTPATIENT)
Dept: PHYSICAL THERAPY | Facility: HOSPITAL | Age: 56
Setting detail: THERAPIES SERIES
Discharge: HOME | End: 2021-12-13

## 2021-12-13 DIAGNOSIS — M75.111 NONTRAUMATIC INCOMPLETE TEAR OF RIGHT ROTATOR CUFF: ICD-10-CM

## 2021-12-13 DIAGNOSIS — M75.101 ROTATOR CUFF SYNDROME OF RIGHT SHOULDER: Primary | ICD-10-CM

## 2021-12-13 PROCEDURE — 97110 THERAPEUTIC EXERCISES: CPT

## 2021-12-13 NOTE — THERAPY TREATMENT NOTE
Outpatient Physical Therapy Ortho Treatment Note  [facility]     Patient Name: Alysia Deutsch  : 1965  MRN: [id number]  Today's Date: 2021      Visit Date: 2021     Subjective Improvement 60%  Visits 5/5  Visits approved 30  RTMD 2021  Recert Date 12-    S/P R Subacromial decompression and distal clavicle resection 2021  Post op 3 weeks    Visit Dx:    ICD-10-CM ICD-9-CM   1. Rotator cuff syndrome of right shoulder  M75.101 726.10   2. Nontraumatic incomplete tear of right rotator cuff  M75.111 726.13       Patient Active Problem List   Diagnosis   • Idiopathic osteoporosis   • Solitary pulmonary nodule   • Adenocarcinoma of left lung (HCC)   • Panlobular emphysema (HCC)   • Mixed hyperlipidemia   • Benign essential HTN   • Generalized abdominal pain   • Other constipation   • Gastroesophageal reflux disease   • Chronic low back pain   • Rotator cuff syndrome, right   • Right hip pain   • Tear of right rotator cuff   • Lateral epicondylitis, right elbow   • Lumbar degenerative disc disease   • Nicotine dependence, cigarettes, with other nicotine-induced disorders   • S/P arthroscopy of right shoulder        Past Medical History:   Diagnosis Date   • Anxiety    • Back pain    • Cancer (HCC)     lung   • Hepatitis    • Lung mass    • PONV (postoperative nausea and vomiting)    • Shoulder pain, right         Past Surgical History:   Procedure Laterality Date   • BREAST AUGMENTATION     • COLONOSCOPY N/A 10/27/2020    Procedure: COLONOSCOPY;  Surgeon: Carlos Enrique Alcantara MD;  Location: [facility] ENDOSCOPY;  Service: Gastroenterology;  Laterality: N/A;   • ENDOSCOPY N/A 10/27/2020    Procedure: ESOPHAGOGASTRODUODENOSCOPY;  Surgeon: Carlos Enrique Alcantara MD;  Location: [facility] ENDOSCOPY;  Service: Gastroenterology;  Laterality: N/A;   • LAPAROSCOPIC TUBAL LIGATION     • LUNG LOBECTOMY Left 2020    LOWER   • SHOULDER ARTHROSCOPY Right 2021    Procedure: ARTHROSCOPY OF  THE RIGHT SHOULDER WITH SUBACROMIAL DECOMPRESSION, CRISTAL PROCEDURE;  Surgeon: Dario Wilson MD;  Location: [facility];  Service: Orthopedics;  Laterality: Right;   • SPINAL FUSION Right 06/07/2021    R SI joint fusion   • THORACOSCOPY Left 8/20/2020    Procedure: LEFT THORACOSCOPY VIDEO ASSISTED THORACOTOMY pulmonary resection thoracic mediastinal lymphadenectomy bronchoscopy;  Surgeon: Horace Christiansen MD;  Location: [facility];  Service: Cardiothoracic;  Laterality: Left;  [place] [phone]  VATS [phone]        PT Ortho     Row Name 12/13/21 1100       Subjective Comments    Subjective Comments Patient states that she has been going without her sling more.  -CP       Precautions and Contraindications    Precautions R SAD/Cristal 11/22/21   -CP    Contraindications L lobectomy due to lung cancer August 2020   -CP       Subjective Pain    Able to rate subjective pain? yes  -CP    Pre-Treatment Pain Level 3  -CP       Posture/Observations    Posture/Observations Comments Presents without sling. Sutures removed since last therapy session.  -CP          User Key  (r) = Recorded By, (t) = Taken By, (c) = Cosigned By    Initials Name Provider Type    Ute Iverson PTA Physical Therapy Assistant                             PT Assessment/Plan     Row Name 12/13/21 1155          PT Assessment    Assessment Comments Very good tolerance to AROM this date.  Increasing in P/AROM.  -CP            PT Plan    PT Frequency 2x/week  -CP     Predicted Duration of Therapy Intervention (PT) 4-6 weeks  -CP     PT Plan Comments Cont with POC.  Add wall wipes next if tolerated  -CP           User Key  (r) = Recorded By, (t) = Taken By, (c) = Cosigned By    Initials Name Provider Type    Ute Iverson PTA Physical Therapy Assistant                   OP Exercises     Row Name 12/13/21 1158 12/13/21 1100          Subjective Comments    Subjective Comments -- Patient states that she has been going without her  sling more.  -CP            Subjective Pain    Able to rate subjective pain? -- yes  -CP     Pre-Treatment Pain Level -- 3  -CP     Post-Treatment Pain Level -- 4  -CP     Subjective Pain Comment -- declines ice  -CP            Total Minutes    25069 - PT Therapeutic Exercise Minutes 43  -CP --            Exercise 1    Exercise Name 1 -- Pro II level 1  -CP     Cueing 1 -- Verbal; Tactile  -CP     Time 1 -- 8  -CP     Additional Comments -- FW/BW  -CP            Exercise 2    Exercise Name 2 -- pulleys fl and scap  -CP     Cueing 2 -- Verbal; Demo  -CP     Reps 2 -- 30 rep each  -CP            Exercise 3    Exercise Name 3 -- table wipes  -CP     Cueing 3 -- Verbal; Demo  -CP     Reps 3 -- 30  -CP            Exercise 4    Exercise Name 4 -- split table wipes  -CP     Cueing 4 -- Verbal; Demo  -CP     Reps 4 -- 20  -CP            Exercise 5    Exercise Name 5 -- supine chest press  -CP     Cueing 5 -- Verbal  -CP     Reps 5 -- 20  -CP            Exercise 6    Exercise Name 6 -- supine AROM fl, AB and IR/ER at side  -CP     Reps 6 -- 20 each  -CP            Exercise 7    Exercise Name 7 -- PROM R Shoulder  -CP     Cueing 7 -- Verbal; Tactile  -CP     Reps 7 -- 20 each  -CP     Time 7 -- IR/Er at 60 of AB  -CP           User Key  (r) = Recorded By, (t) = Taken By, (c) = Cosigned By    Initials Name Provider Type    CP Ute Dominguez, PTA Physical Therapy Assistant                              PT OP Goals     Row Name 12/13/21 1100          PT Short Term Goals    STG Date to Achieve 12/15/21  -CP     STG 1 Note a >/= 50% subjective improvement.  -CP     STG 1 Progress Met  -CP     STG 2 R shoulder PROM WNLs.  -CP     STG 2 Progress Not Met; Ongoing  -CP     STG 3 R shoulder active flexion and abduction to be >/= 90 deg each.  -CP     STG 3 Progress Not Met; Ongoing  -CP            Long Term Goals    LTG Date to Achieve 01/05/22  -CP     LTG 1 Independent with HEP.  -CP     LTG 1 Progress Ongoing  -CP     LTG 2 R  shoulder AROM WFLs.  -CP     LTG 2 Progress Progressing  -CP     LTG 3 QuickDASH score to be </= 20.  -CP     LTG 3 Progress Not Met  -CP     LTG 4 Complete ADLs and IADLs with minimal discomfort.  -CP     LTG 4 Progress Progressing  -CP            Time Calculation    PT Goal Re-Cert Due Date 12/15/21  -CP           User Key  (r) = Recorded By, (t) = Taken By, (c) = Cosigned By    Initials Name Provider Type    CP Ute Dominguez, AMADO Physical Therapy Assistant                Therapy Education  Education Details: table/counter wipes  Given: HEP  Program: New  How Provided: Verbal, Demonstration  Provided to: Patient  Level of Understanding: Teach back education performed, Verbalized, Demonstrated              Time Calculation:   Start Time: 1107  Stop Time: 1150  Time Calculation (min): 43 min  Total Timed Code Minutes- PT: 43 minute(s)  Timed Charges  38928 - PT Therapeutic Exercise Minutes: 43  Total Minutes  Timed Charges Total Minutes: 43   Total Minutes: 43  Therapy Charges for Today     Code Description Service Date Service Provider Modifiers Qty    78381227837 HC PT THER PROC EA 15 MIN 12/13/2021 Ute Dominguez PTA GP 3                    Ute Dominguez PTA  12/13/2021

## 2021-12-17 ENCOUNTER — HOSPITAL ENCOUNTER (OUTPATIENT)
Dept: PHYSICAL THERAPY | Facility: HOSPITAL | Age: 56
Setting detail: THERAPIES SERIES
Discharge: HOME | End: 2021-12-17

## 2021-12-17 DIAGNOSIS — M75.101 ROTATOR CUFF SYNDROME OF RIGHT SHOULDER: Primary | ICD-10-CM

## 2021-12-17 DIAGNOSIS — M75.111 NONTRAUMATIC INCOMPLETE TEAR OF RIGHT ROTATOR CUFF: ICD-10-CM

## 2021-12-17 PROCEDURE — 97110 THERAPEUTIC EXERCISES: CPT | Performed by: PHYSICAL THERAPIST

## 2021-12-17 NOTE — THERAPY PROGRESS REPORT/RE-CERT
Outpatient Physical Therapy Ortho Progress Note  [facility]     Patient Name: Alysia Deutsch  : 1965  MRN: [id number]  Today's Date: 2021      Visit Date: 2021    Attendance:  (30/yr)  Subjective Improvement: 60%  Next MD Appt: 22  Recert Date: 22     Therapy Diagnosis: R subacromial decompression and distal clavicle resection 21     Changes in Medications: none noted  Changes in MD Orders: none noted  Number of Work Days Lost: since 21       Past Medical History:   Diagnosis Date   • Anxiety    • Back pain    • Cancer (HCC)     lung   • Hepatitis    • Lung mass    • PONV (postoperative nausea and vomiting)    • Shoulder pain, right         Past Surgical History:   Procedure Laterality Date   • BREAST AUGMENTATION     • COLONOSCOPY N/A 10/27/2020    Procedure: COLONOSCOPY;  Surgeon: Carlos Enrique Alcantara MD;  Location: [facility] ENDOSCOPY;  Service: Gastroenterology;  Laterality: N/A;   • ENDOSCOPY N/A 10/27/2020    Procedure: ESOPHAGOGASTRODUODENOSCOPY;  Surgeon: Carlos Enrique Alcantara MD;  Location: [facility] ENDOSCOPY;  Service: Gastroenterology;  Laterality: N/A;   • LAPAROSCOPIC TUBAL LIGATION     • LUNG LOBECTOMY Left 2020    LOWER   • SHOULDER ARTHROSCOPY Right 2021    Procedure: ARTHROSCOPY OF THE RIGHT SHOULDER WITH SUBACROMIAL DECOMPRESSION, Ira PROCEDURE;  Surgeon: Dario Wilson MD;  Location: [facility] OR;  Service: Orthopedics;  Laterality: Right;   • SPINAL FUSION Right 2021    R SI joint fusion   • THORACOSCOPY Left 2020    Procedure: LEFT THORACOSCOPY VIDEO ASSISTED THORACOTOMY pulmonary resection thoracic mediastinal lymphadenectomy bronchoscopy;  Surgeon: Horace Christiansen MD;  Location: [facility] OR;  Service: Cardiothoracic;  Laterality: Left;  [place] [phone]  VATS [phone]       Visit Dx:     ICD-10-CM ICD-9-CM   1. Rotator cuff syndrome of right shoulder  M75.101 726.10   2. Nontraumatic incomplete tear of right  "rotator cuff  M75.111 726.13              PT Ortho     Row Name 12/17/21 1100       Subjective Comments    Subjective Comments Patient reports that her shoulder is doing better, but not as good as she wants it to be. \"I was hoping that I'd be a little further along.\" 60% subjective improvement. \"I'm achy and sore everywhere\" with weather changes.  -       Precautions and Contraindications    Precautions R SAD/Juana 11/22/21  -    Contraindications L lobectomy due to lung cancer August 2020  -       Subjective Pain    Able to rate subjective pain? yes  -SS    Pre-Treatment Pain Level 3  -SS    Post-Treatment Pain Level 4  -       Posture/Observations    Posture/Observations Comments No sling.  -SS       Right Upper Ext    Rt Shoulder Abduction AROM 109 deg; anterior and lateral shoulder pain  -    Rt Shoulder Abduction PROM 150 deg; firm end-feel; palpable pop/shift noted during abduction  -    Rt Shoulder Extension AROM 32 deg; sore shoulder and UT  -SS    Rt Shoulder Flexion AROM 115 deg  -    Rt Shoulder Flexion PROM 145 deg  -    Rt Shoulder External Rotation AROM 45 deg with arm at side; 80 deg in supine 90/90 position  -SS    Rt Shoulder External Rotation PROM 90 deg in supine 90/90  -SS    Rt Shoulder Internal Rotation AROM Functional IR: radial border of hand at R PSIS  -SS       MMT Right Upper Ext    Rt Upper Extremity Comments  MMT deferred  -SS          User Key  (r) = Recorded By, (t) = Taken By, (c) = Cosigned By    Initials Name Provider Type    Puma Lopez, PT DPT Physical Therapist                            Therapy Education  Education Details: 6-way shoulder isometrics, t-band shoulder mid row  Given: HEP  Program: New  How Provided: Verbal, Demonstration, Written  Provided to: Patient  Level of Understanding: Verbalized, Demonstrated      PT OP Goals     Row Name 12/17/21 1100          PT Short Term Goals    STG Date to Achieve 01/07/21  -SS     STG 1 Note a >/= 50% " subjective improvement.  -     STG 1 Progress Met  -     STG 2 R shoulder PROM WNLs.  -SS     STG 2 Progress Not Met; Ongoing  -     STG 3 R shoulder active flexion and abduction to be >/= 90 deg each.  -SS     STG 3 Progress Not Met; Ongoing  -            Long Term Goals    LTG Date to Achieve 01/28/22  -     LTG 1 Independent with HEP.  -SS     LTG 1 Progress Not Met; Ongoing  -     LTG 2 R shoulder AROM WFLs.  -SS     LTG 2 Progress Not Met; Ongoing  -SS     LTG 3 QuickDASH score to be </= 20.  -SS     LTG 3 Progress Not Met; Ongoing  -     LTG 4 Complete ADLs and IADLs with minimal discomfort.  -     LTG 4 Progress Not Met; Ongoing  -            Time Calculation    PT Goal Re-Cert Due Date 01/07/22  -           User Key  (r) = Recorded By, (t) = Taken By, (c) = Cosigned By    Initials Name Provider Type     Puma Lauren, PT DPT Physical Therapist                 PT Assessment/Plan     Row Name 12/17/21 1100          PT Assessment    Functional Limitations Limitation in home management; Limitations in functional capacity and performance; Limitations in community activities; Performance in leisure activities; Performance in self-care ADL; Performance in work activities  -     Impairments Joint mobility; Pain; Range of motion  -     Assessment Comments Patient dispensed 1-yard of red theraband for use in HEP. Patient is progressing. However, he improvement has been limited by her pain and guarding initially. She tolerated isometrics well this date.  -     Rehab Potential Good  -     Patient/caregiver participated in establishment of treatment plan and goals Yes  -     Patient would benefit from skilled therapy intervention Yes  -            PT Plan    PT Frequency 2x/week  -     Predicted Duration of Therapy Intervention (PT) 4-6 additional weeks  -     PT Plan Comments MHP to start, A/AA/PROM, stretching, strengthening. No electromodalities due to recent history of  "lung cancer.  -SS           User Key  (r) = Recorded By, (t) = Taken By, (c) = Cosigned By    Initials Name Provider Type    SS Puma Lauren, PT DPT Physical Therapist                 Modalities     Row Name 12/17/21 1100             Moist Heat    MH Applied Yes  -SS      Location R shoulder  -SS      PT Moist Heat Minutes 10  -SS            User Key  (r) = Recorded By, (t) = Taken By, (c) = Cosigned By    Initials Name Provider Type    SS Puma Lauren, PT DPT Physical Therapist               OP Exercises     Row Name 12/17/21 1100             Subjective Comments    Subjective Comments Patient reports that her shoulder is doing better, but not as good as she wants it to be. \"I was hoping that I'd be a little further along.\" 60% subjective improvement. \"I'm achy and sore everywhere\" with weather changes.  -SS              Subjective Pain    Able to rate subjective pain? yes  -SS      Pre-Treatment Pain Level 3  -SS      Post-Treatment Pain Level 4  -SS              Exercise 1    Exercise Name 1 recheck  -SS              Exercise 2    Exercise Name 2 MHP R shoulder  -SS      Time 2 10 mins  -SS              Exercise 3    Exercise Name 3 Wall wipe flexion  -SS      Cueing 3 Verbal; Demo  -SS      Sets 3 1  -SS      Reps 3 15  -SS              Exercise 4    Exercise Name 4 Wall wipe abduction  -SS      Cueing 4 Verbal; Demo  -SS      Sets 4 1  -SS      Reps 4 10  -SS              Exercise 5    Exercise Name 5 Isometric shoulder 6-way  -SS      Cueing 5 Verbal; Demo  -SS      Sets 5 1  -SS      Reps 5 10 each  -SS      Time 5 5 sec hold  -SS              Exercise 6    Exercise Name 6 T-band row  -SS      Cueing 6 Verbal; Demo  -SS      Sets 6 1  -SS      Reps 6 10  -SS      Additional Comments red  -SS            User Key  (r) = Recorded By, (t) = Taken By, (c) = Cosigned By    Initials Name Provider Type    SS Puma Lauren, PT DPT Physical Therapist                                  "       Time Calculation:     Start Time: 1102  Stop Time: 1145  Time Calculation (min): 43 min  Timed Charges  37759 - PT Therapeutic Exercise Minutes: 33  Untimed Charges  PT Moist Heat Minutes: 10  Total Minutes  Timed Charges Total Minutes: 33  Untimed Charges Total Minutes: 10   Total Minutes: 43     Therapy Charges for Today     Code Description Service Date Service Provider Modifiers Qty    02817261629 HC PT THER PROC EA 15 MIN 12/17/2021 Puma Lauren, PT DPT GP 2    35591139216 HC PT THER SUPP EA 15 MIN 12/17/2021 Puma Lauren, PT DPT GP 1                    Puma Lauren, PT, DPT, CHT  12/17/2021

## 2021-12-20 ENCOUNTER — HOSPITAL ENCOUNTER (OUTPATIENT)
Dept: PHYSICAL THERAPY | Facility: HOSPITAL | Age: 56
Setting detail: THERAPIES SERIES
Discharge: HOME | End: 2021-12-20

## 2021-12-20 DIAGNOSIS — M75.111 NONTRAUMATIC INCOMPLETE TEAR OF RIGHT ROTATOR CUFF: ICD-10-CM

## 2021-12-20 DIAGNOSIS — M75.101 ROTATOR CUFF SYNDROME OF RIGHT SHOULDER: Primary | ICD-10-CM

## 2021-12-20 PROCEDURE — 97110 THERAPEUTIC EXERCISES: CPT

## 2021-12-20 PROCEDURE — 97140 MANUAL THERAPY 1/> REGIONS: CPT

## 2021-12-20 NOTE — THERAPY TREATMENT NOTE
Outpatient Physical Therapy Ortho Treatment Note  [facility]     Patient Name: Alysia Deutsch  : 1965  MRN: [id number]  Today's Date: 2021      Visit Date: 2021     Attendance:  (30/yr)  Subjective Improvement: 60%  Next MD Appt: 22  Recert Date: 22     Therapy Diagnosis: R subacromial decompression and distal clavicle resection 21    Visit Dx:    ICD-10-CM ICD-9-CM   1. Rotator cuff syndrome of right shoulder  M75.101 726.10   2. Nontraumatic incomplete tear of right rotator cuff  M75.111 726.13       Patient Active Problem List   Diagnosis   • Idiopathic osteoporosis   • Solitary pulmonary nodule   • Adenocarcinoma of left lung (HCC)   • Panlobular emphysema (HCC)   • Mixed hyperlipidemia   • Benign essential HTN   • Generalized abdominal pain   • Other constipation   • Gastroesophageal reflux disease   • Chronic low back pain   • Rotator cuff syndrome, right   • Right hip pain   • Tear of right rotator cuff   • Lateral epicondylitis, right elbow   • Lumbar degenerative disc disease   • Nicotine dependence, cigarettes, with other nicotine-induced disorders   • S/P arthroscopy of right shoulder        Past Medical History:   Diagnosis Date   • Anxiety    • Back pain    • Cancer (HCC)     lung   • Hepatitis    • Lung mass    • PONV (postoperative nausea and vomiting)    • Shoulder pain, right         Past Surgical History:   Procedure Laterality Date   • BREAST AUGMENTATION     • COLONOSCOPY N/A 10/27/2020    Procedure: COLONOSCOPY;  Surgeon: Carlos Enrique Alcantara MD;  Location: [facility] ENDOSCOPY;  Service: Gastroenterology;  Laterality: N/A;   • ENDOSCOPY N/A 10/27/2020    Procedure: ESOPHAGOGASTRODUODENOSCOPY;  Surgeon: Carlos Enrique Alcantara MD;  Location: [facility] ENDOSCOPY;  Service: Gastroenterology;  Laterality: N/A;   • LAPAROSCOPIC TUBAL LIGATION     • LUNG LOBECTOMY Left 2020    LOWER   • SHOULDER ARTHROSCOPY Right 2021    Procedure: ARTHROSCOPY OF THE  RIGHT SHOULDER WITH SUBACROMIAL DECOMPRESSION, CRISTAL PROCEDURE;  Surgeon: Dario Wilson MD;  Location: [facility];  Service: Orthopedics;  Laterality: Right;   • SPINAL FUSION Right 06/07/2021    R SI joint fusion   • THORACOSCOPY Left 8/20/2020    Procedure: LEFT THORACOSCOPY VIDEO ASSISTED THORACOTOMY pulmonary resection thoracic mediastinal lymphadenectomy bronchoscopy;  Surgeon: Horace Christiansen MD;  Location: [facility];  Service: Cardiothoracic;  Laterality: Left;  [place] [phone]  VATS [phone]        PT Ortho     Row Name 12/20/21 1100       Precautions and Contraindications    Precautions R SAD/Cristal 11/22/21  -    Contraindications L lobectomy due to lung cancer August 2020  -       Posture/Observations    Posture/Observations Comments No sling noted; rounded shoulder posture; TTP R bicep proximal tendon and tightness in R pec  -          User Key  (r) = Recorded By, (t) = Taken By, (c) = Cosigned By    Initials Name Provider Type     Mellissa Bernardo PTA Physical Therapy Assistant                             PT Assessment/Plan     Row Name 12/20/21 1100          PT Assessment    Functional Limitations Limitation in home management; Limitations in functional capacity and performance; Limitations in community activities; Performance in leisure activities; Performance in self-care ADL; Performance in work activities  -     Impairments Joint mobility; Pain; Range of motion  -     Assessment Comments patient tolerated treatment well and had improved tightness and decreased catching after STM; Did wll with new exercises, soreness no increased pain  -     Rehab Potential Good  -     Patient/caregiver participated in establishment of treatment plan and goals Yes  -     Patient would benefit from skilled therapy intervention Yes  -KH            PT Plan    PT Frequency 2x/week  -     Predicted Duration of Therapy Intervention (PT) 4-6 additional weeks  -     PT Plan Comments  "Continue to progress strength, ROM, and scapular stabilization;  -KH           User Key  (r) = Recorded By, (t) = Taken By, (c) = Cosigned By    Initials Name Provider Type    Mellissa Erickson PTA Physical Therapy Assistant                 Modalities     Row Name 12/20/21 1100             Moist Heat    MH Applied Yes  -KH      Location R shoulder  -KH      MH Prior to Rx Yes  10 mins  -KH            User Key  (r) = Recorded By, (t) = Taken By, (c) = Cosigned By    Initials Name Provider Type    Mellissa Erickson PTA Physical Therapy Assistant               OP Exercises     Row Name 12/20/21 1100             Subjective Comments    Subjective Comments Nolonger a constant pain just really achy vs. painful;  -KH              Subjective Pain    Able to rate subjective pain? yes  -KH      Pre-Treatment Pain Level 2  -KH      Post-Treatment Pain Level 2  -KH              Exercise 1    Exercise Name 1 MHP R shoulder  -KH      Time 1 10 mins  -KH              Exercise 2    Exercise Name 2 PROM w/ gentle jt mobes and STM to bicep/pec to R shoulder  -KH      Time 2 10 mins  -KH              Exercise 3    Exercise Name 3 supine AROM shoulder flexion  -KH      Sets 3 2  -KH      Reps 3 10  -KH      Additional Comments w/ slow ecc control  -KH              Exercise 4    Exercise Name 4 SL AROM shoulder abduction  -KH      Sets 4 2  -KH      Reps 4 10  -KH      Additional Comments w/ slow ecc control  -KH              Exercise 5    Exercise Name 5 SL ER AROM  -KH      Sets 5 2  -KH      Reps 5 10  -KH      Additional Comments w/ slow ecc control  -KH              Exercise 6    Exercise Name 6 CKC serratus punches at wall  -KH      Sets 6 2  -KH      Reps 6 10  -KH              Exercise 7    Exercise Name 7 corner stretch  -KH      Sets 7 3  -KH      Time 7 30\" holds  -KH              Exercise 8    Exercise Name 8 wall push ups  -KH      Sets 8 2  -KH      Reps 8 10  -KH            User Key  (r) = Recorded By, (t) = Taken By, (c) = " Cosigned By    Initials Name Provider Type    Mellissa Erickson PTA Physical Therapy Assistant                              PT OP Goals     Row Name 12/20/21 1100          PT Short Term Goals    STG Date to Achieve 01/07/21  -     STG 1 Note a >/= 50% subjective improvement.  -     STG 1 Progress Met  -     STG 2 R shoulder PROM WNLs.  -     STG 2 Progress Not Met; Ongoing  -     STG 3 R shoulder active flexion and abduction to be >/= 90 deg each.  -     STG 3 Progress Not Met; Ongoing  -            Long Term Goals    LTG Date to Achieve 01/28/22  -     LTG 1 Independent with HEP.  -     LTG 1 Progress Not Met; Ongoing  -     LTG 2 R shoulder AROM WFLs.  -     LTG 2 Progress Not Met; Ongoing  -     LTG 3 QuickDASH score to be </= 20.  -     LTG 3 Progress Not Met; Ongoing  -     LTG 4 Complete ADLs and IADLs with minimal discomfort.  -     LTG 4 Progress Not Met; Ongoing  -            Time Calculation    PT Goal Re-Cert Due Date 01/07/22  -           User Key  (r) = Recorded By, (t) = Taken By, (c) = Cosigned By    Initials Name Provider Type    Mellissa Erickson PTA Physical Therapy Assistant                               Time Calculation:   Start Time: 1055  Stop Time: 1145  Time Calculation (min): 50 min  Total Timed Code Minutes- PT: 40 minute(s)  Therapy Charges for Today     Code Description Service Date Service Provider Modifiers Qty    75598050840 HC PT THER SUPP EA 15 MIN 12/20/2021 Mellissa Bernardo PTA GP 1    17468054969 HC PT THER PROC EA 15 MIN 12/20/2021 Mellissa Bernardo PTA GP 2    40211156950 HC PT MANUAL THERAPY EA 15 MIN 12/20/2021 Mellissa Bernardo PTA GP 1                    Mellissa Bernardo PTA  12/20/2021

## 2021-12-22 ENCOUNTER — HOSPITAL ENCOUNTER (OUTPATIENT)
Dept: PHYSICAL THERAPY | Facility: HOSPITAL | Age: 56
Setting detail: THERAPIES SERIES
Discharge: HOME | End: 2021-12-22

## 2021-12-22 DIAGNOSIS — M75.111 NONTRAUMATIC INCOMPLETE TEAR OF RIGHT ROTATOR CUFF: ICD-10-CM

## 2021-12-22 DIAGNOSIS — M75.101 ROTATOR CUFF SYNDROME OF RIGHT SHOULDER: Primary | ICD-10-CM

## 2021-12-22 PROCEDURE — 97110 THERAPEUTIC EXERCISES: CPT

## 2021-12-22 NOTE — THERAPY TREATMENT NOTE
Outpatient Physical Therapy Ortho Treatment Note  [facility]     Patient Name: Alysia Deutsch  : 1965  MRN: [id number]  Today's Date: 2021      Visit Date: 2021     Subjective Improvement 60%  Visits 8/8  Visits apporved 30  RTMD 2022  Recert Date 2022      Visit Dx:    ICD-10-CM ICD-9-CM   1. Rotator cuff syndrome of right shoulder  M75.101 726.10   2. Nontraumatic incomplete tear of right rotator cuff  M75.111 726.13       Patient Active Problem List   Diagnosis   • Idiopathic osteoporosis   • Solitary pulmonary nodule   • Adenocarcinoma of left lung (HCC)   • Panlobular emphysema (HCC)   • Mixed hyperlipidemia   • Benign essential HTN   • Generalized abdominal pain   • Other constipation   • Gastroesophageal reflux disease   • Chronic low back pain   • Rotator cuff syndrome, right   • Right hip pain   • Tear of right rotator cuff   • Lateral epicondylitis, right elbow   • Lumbar degenerative disc disease   • Nicotine dependence, cigarettes, with other nicotine-induced disorders   • S/P arthroscopy of right shoulder        Past Medical History:   Diagnosis Date   • Anxiety    • Back pain    • Cancer (HCC)     lung   • Hepatitis    • Lung mass    • PONV (postoperative nausea and vomiting)    • Shoulder pain, right         Past Surgical History:   Procedure Laterality Date   • BREAST AUGMENTATION     • COLONOSCOPY N/A 10/27/2020    Procedure: COLONOSCOPY;  Surgeon: Carlos Enrique Alcantara MD;  Location: [facility] ENDOSCOPY;  Service: Gastroenterology;  Laterality: N/A;   • ENDOSCOPY N/A 10/27/2020    Procedure: ESOPHAGOGASTRODUODENOSCOPY;  Surgeon: Carlos Enrique Alcantara MD;  Location: [facility] ENDOSCOPY;  Service: Gastroenterology;  Laterality: N/A;   • LAPAROSCOPIC TUBAL LIGATION     • LUNG LOBECTOMY Left 2020    LOWER   • SHOULDER ARTHROSCOPY Right 2021    Procedure: ARTHROSCOPY OF THE RIGHT SHOULDER WITH SUBACROMIAL DECOMPRESSION, CRISTAL PROCEDURE;  Surgeon: Dario Wilson  MD [place];  Location: [facility] OR;  Service: Orthopedics;  Laterality: Right;   • SPINAL FUSION Right 06/07/2021    R SI joint fusion   • THORACOSCOPY Left 8/20/2020    Procedure: LEFT THORACOSCOPY VIDEO ASSISTED THORACOTOMY pulmonary resection thoracic mediastinal lymphadenectomy bronchoscopy;  Surgeon: Horace Christiansen MD;  Location: [facility] OR;  Service: Cardiothoracic;  Laterality: Left;  [place] [phone]  VATS [phone]                        PT Assessment/Plan     Row Name 12/22/21 1225          PT Assessment    Assessment Comments Patient is TTP to right biceps area.  Patient does have full PROM in right shoulder with pain at end of range of fl and ab  -CP            PT Plan    PT Frequency 2x/week  -CP     Predicted Duration of Therapy Intervention (PT) 4-6 weeks  -CP     PT Plan Comments Cont with POC.  scap stab and ER strenghtening next  -CP           User Key  (r) = Recorded By, (t) = Taken By, (c) = Cosigned By    Initials Name Provider Type    CP Ute Dominguez PTA Physical Therapy Assistant                 Modalities     Row Name 12/22/21 1100             Subjective Pain    Able to rate subjective pain? yes  -CP      Pre-Treatment Pain Level 2  -CP              Moist Heat    MH Applied Yes  -CP      Location R Shoulder  -CP      PT Moist Heat Minutes 10  -CP      MH Prior to Rx Yes  -CP            User Key  (r) = Recorded By, (t) = Taken By, (c) = Cosigned By    Initials Name Provider Type    Ute Iverson, PTA Physical Therapy Assistant               OP Exercises     Row Name 12/22/21 1230 12/22/21 1100          Subjective Comments    Subjective Comments -- Patient states that her should doesnt hurt all the time.  Patient is performing isometrics at home  -CP            Subjective Pain    Able to rate subjective pain? -- yes  -CP     Pre-Treatment Pain Level -- 2  -CP            Total Minutes    98416 - PT Therapeutic Exercise Minutes 45  -CP --            Exercise 1    Exercise  Name 1 -- MHP to right shoulder  -CP     Time 1 -- 10'  -CP            Exercise 2    Exercise Name 2 -- PROM  -CP     Time 2 -- 7  -CP            Exercise 3    Exercise Name 3 -- MFR/STM to biceps area  -CP     Cueing 3 -- Verbal; Tactile  -CP     Time 3 -- 3  -CP            Exercise 4    Exercise Name 4 -- supine horz shoulder AB/AD with cane  -CP     Cueing 4 -- Verbal; Tactile; Demo  -CP     Reps 4 -- 20  -CP            Exercise 5    Exercise Name 5 -- SL'ing AB  -CP     Cueing 5 -- Verbal; Tactile  -CP     Reps 5 -- 20  -CP            Exercise 6    Exercise Name 6 -- SL'ing ER  -CP     Cueing 6 -- Verbal; Demo; Tactile  -CP     Reps 6 -- 20  -CP            Exercise 7    Exercise Name 7 -- reclined shoulder fl AROM  -CP     Cueing 7 -- Verbal; Demo; Tactile  -CP     Sets 7 -- 2  -CP     Reps 7 -- 10  -CP            Exercise 8    Exercise Name 8 -- scap row with tband  -CP     Cueing 8 -- Verbal; Demo  -CP     Sets 8 -- 2  -CP     Reps 8 -- 10  -CP     Time 8 -- red  -CP            Exercise 9    Exercise Name 9 -- wall push up wall push up  -CP     Cueing 9 -- Verbal; Demo  -CP     Sets 9 -- 2  -CP     Reps 9 -- 10  -CP            Exercise 10    Exercise Name 10 -- scap rows high with tband  -CP     Cueing 10 -- Verbal; Demo  -CP     Sets 10 -- 2  -CP     Reps 10 -- 10  -CP     Time 10 -- red  -CP            Exercise 11    Exercise Name 11 -- standing shoulder ext with tband  -CP     Cueing 11 -- Verbal; Demo  -CP     Sets 11 -- 2  -CP     Reps 11 -- 10  -CP     Time 11 -- red tband  -CP           User Key  (r) = Recorded By, (t) = Taken By, (c) = Cosigned By    Initials Name Provider Type    Ute Iverson, PTA Physical Therapy Assistant                              PT OP Goals     Row Name 12/22/21 1200          PT Short Term Goals    STG Date to Achieve 01/07/21  -CP     STG 1 Note a >/= 50% subjective improvement.  -CP     STG 1 Progress Met  -CP     STG 2 R shoulder PROM WNLs.  -CP     STG 2 Progress  Not Met; Ongoing  -CP     STG 3 R shoulder active flexion and abduction to be >/= 90 deg each.  -CP     STG 3 Progress Not Met; Ongoing  -CP            Long Term Goals    LTG Date to Achieve 01/28/22  -CP     LTG 1 Independent with HEP.  -CP     LTG 1 Progress Not Met; Ongoing  -CP     LTG 2 R shoulder AROM WFLs.  -CP     LTG 2 Progress Not Met; Ongoing  -CP     LTG 3 QuickDASH score to be </= 20.  -CP     LTG 3 Progress Not Met; Ongoing  -CP     LTG 4 Complete ADLs and IADLs with minimal discomfort.  -CP     LTG 4 Progress Not Met; Ongoing  -CP            Time Calculation    PT Goal Re-Cert Due Date 01/07/22  -CP           User Key  (r) = Recorded By, (t) = Taken By, (c) = Cosigned By    Initials Name Provider Type    CP Ute Dominguez, AMADO Physical Therapy Assistant                Therapy Education  Education Details: scap rows mid and high with tband, shoulder ext with tband  Given: HEP  Program: New  How Provided: Verbal, Demonstration  Provided to: Patient  Level of Understanding: Teach back education performed, Verbalized, Demonstrated              Time Calculation:   Start Time: 1100  Stop Time: 1200  Time Calculation (min): 60 min  Total Timed Code Minutes- PT: 45 minute(s)  Timed Charges  65028 - PT Therapeutic Exercise Minutes: 45  Untimed Charges  PT Moist Heat Minutes: 10  Total Minutes  Timed Charges Total Minutes: 45  Untimed Charges Total Minutes: 10   Total Minutes: 45  Therapy Charges for Today     Code Description Service Date Service Provider Modifiers Qty    71118664752 HC PT THER SUPP EA 15 MIN 12/22/2021 Ute Dominguez, AMADO GP 1    09041486708 HC PT THER PROC EA 15 MIN 12/22/2021 Ute Dominguez PTA GP 3                    Ute Dominguez PTA  12/22/2021

## 2021-12-27 ENCOUNTER — HOSPITAL ENCOUNTER (OUTPATIENT)
Dept: PHYSICAL THERAPY | Facility: HOSPITAL | Age: 56
Setting detail: THERAPIES SERIES
Discharge: HOME | End: 2021-12-27

## 2021-12-27 DIAGNOSIS — M75.101 ROTATOR CUFF SYNDROME OF RIGHT SHOULDER: Primary | ICD-10-CM

## 2021-12-27 DIAGNOSIS — M75.111 NONTRAUMATIC INCOMPLETE TEAR OF RIGHT ROTATOR CUFF: ICD-10-CM

## 2021-12-27 PROCEDURE — 97110 THERAPEUTIC EXERCISES: CPT

## 2021-12-27 NOTE — THERAPY TREATMENT NOTE
Outpatient Physical Therapy Ortho Treatment Note  [facility]     Patient Name: Alysia Deutsch  : 1965  MRN: [id number]  Today's Date: 2021      Visit Date: 2021     Subjective Improvement 60%  Visits 9/  Visits approved 30  RTMD 2022  Recert Date 2022    Therapy Diagnosis: R subacromial decompression and distal clavicle resection 21  Post op 5 weeks     Visit Dx:    ICD-10-CM ICD-9-CM   1. Rotator cuff syndrome of right shoulder  M75.101 726.10   2. Nontraumatic incomplete tear of right rotator cuff  M75.111 726.13       Patient Active Problem List   Diagnosis   • Idiopathic osteoporosis   • Solitary pulmonary nodule   • Adenocarcinoma of left lung (HCC)   • Panlobular emphysema (HCC)   • Mixed hyperlipidemia   • Benign essential HTN   • Generalized abdominal pain   • Other constipation   • Gastroesophageal reflux disease   • Chronic low back pain   • Rotator cuff syndrome, right   • Right hip pain   • Tear of right rotator cuff   • Lateral epicondylitis, right elbow   • Lumbar degenerative disc disease   • Nicotine dependence, cigarettes, with other nicotine-induced disorders   • S/P arthroscopy of right shoulder        Past Medical History:   Diagnosis Date   • Anxiety    • Back pain    • Cancer (HCC)     lung   • Hepatitis    • Lung mass    • PONV (postoperative nausea and vomiting)    • Shoulder pain, right         Past Surgical History:   Procedure Laterality Date   • BREAST AUGMENTATION     • COLONOSCOPY N/A 10/27/2020    Procedure: COLONOSCOPY;  Surgeon: Carlos Enrique Alcantara MD;  Location: [facility] ENDOSCOPY;  Service: Gastroenterology;  Laterality: N/A;   • ENDOSCOPY N/A 10/27/2020    Procedure: ESOPHAGOGASTRODUODENOSCOPY;  Surgeon: Carlos Enrique Alcantara MD;  Location: [facility] ENDOSCOPY;  Service: Gastroenterology;  Laterality: N/A;   • LAPAROSCOPIC TUBAL LIGATION     • LUNG LOBECTOMY Left 2020    LOWER   • SHOULDER ARTHROSCOPY Right 2021    Procedure:  ARTHROSCOPY OF THE RIGHT SHOULDER WITH SUBACROMIAL DECOMPRESSION, [place] PROCEDURE;  Surgeon: Dario Wilson MD;  Location: [facility];  Service: Orthopedics;  Laterality: Right;   • SPINAL FUSION Right 06/07/2021    R SI joint fusion   • THORACOSCOPY Left 8/20/2020    Procedure: LEFT THORACOSCOPY VIDEO ASSISTED THORACOTOMY pulmonary resection thoracic mediastinal lymphadenectomy bronchoscopy;  Surgeon: Horace Christiansen MD;  Location: [facility] OR;  Service: Cardiothoracic;  Laterality: Left;  [place] [phone]  VATS [phone]        PT Ortho     Row Name 12/27/21 1100       Precautions and Contraindications    Precautions R SAD/[place] 11/22/21  -CP    Contraindications L lobectomy due to lung cancer August 2020  -CP       Posture/Observations    Posture/Observations Comments No sling noted; rounded shoulder posture; TTP R bicep proximal tendon and tightness in R pec  -CP       Right Upper Ext    Rt Shoulder Abduction AROM 116  -CP    Rt Shoulder Flexion AROM 143  -CP          User Key  (r) = Recorded By, (t) = Taken By, (c) = Cosigned By    Initials Name Provider Type    Ute Iverson PTA Physical Therapy Assistant                             PT Assessment/Plan     Row Name 12/27/21 1154          PT Assessment    Assessment Comments Patient does present today with increase right shoulder pain.  she does have increase P/AROM with two new goals met.  patient encouraged to let pain be her guide when performing HEP  -CP            PT Plan    PT Frequency 2x/week  -CP     Predicted Duration of Therapy Intervention (PT) 4-6 weeks  -CP     PT Plan Comments Cont with POC.  progressing as pain allows.  -CP           User Key  (r) = Recorded By, (t) = Taken By, (c) = Cosigned By    Initials Name Provider Type    Ute Iverson PTA Physical Therapy Assistant                   OP Exercises     Row Name 12/27/21 1156 12/27/21 1100          Subjective Comments    Subjective Comments -- patient reports  that she has more pain today.  She is frustrated at her lack of progress in both the shoulder and the hip  She states that she started to have increase in right shoulder yesterday  -CP            Subjective Pain    Able to rate subjective pain? -- yes  -CP     Pre-Treatment Pain Level -- 4  -CP     Post-Treatment Pain Level -- 4  -CP            Total Minutes    71636 - PT Therapeutic Exercise Minutes 39  -CP --            Exercise 1    Exercise Name 1 -- Pro II level 2  -CP     Time 1 -- 10  -CP     Additional Comments -- UE/LE  -CP            Exercise 2    Exercise Name 2 -- Pulleys fl and scap  -CP     Cueing 2 -- Verbal  -CP     Reps 2 -- 20 each  -CP            Exercise 3    Exercise Name 3 -- PROM  -CP     Cueing 3 -- Verbal; Tactile  -CP     Time 3 -- 9  -CP            Exercise 4    Exercise Name 4 -- prone ext  -CP     Cueing 4 -- Verbal; Tactile  -CP     Sets 4 -- 2  -CP     Reps 4 -- 10  -CP            Exercise 5    Exercise Name 5 -- prone scap rows  -CP     Cueing 5 -- Verbal; Tactile  -CP     Sets 5 -- 2  -CP     Reps 5 -- 10  -CP     Time 5 -- 1 lb  -CP            Exercise 6    Exercise Name 6 -- wall wipes  -CP     Cueing 6 -- Verbal; Demo  -CP     Reps 6 -- 20  -CP           User Key  (r) = Recorded By, (t) = Taken By, (c) = Cosigned By    Initials Name Provider Type    CP Ute Dominguez, PTA Physical Therapy Assistant                              PT OP Goals     Row Name 12/27/21 1100          PT Short Term Goals    STG Date to Achieve 01/07/21  -CP     STG 1 Note a >/= 50% subjective improvement.  -CP     STG 1 Progress Met  -CP     STG 2 R shoulder PROM WNLs.  -CP     STG 2 Progress Met  -CP     STG 3 R shoulder active flexion and abduction to be >/= 90 deg each.  -CP     STG 3 Progress Met  -CP            Long Term Goals    LTG Date to Achieve 01/28/22  -CP     LTG 1 Independent with HEP.  -CP     LTG 1 Progress Not Met; Ongoing  -CP     LTG 2 R shoulder AROM WFLs.  -CP     LTG 2 Progress Not  Met; Ongoing  -CP     LTG 3 QuickDASH score to be </= 20.  -CP     LTG 3 Progress Not Met; Ongoing  -CP     LTG 4 Complete ADLs and IADLs with minimal discomfort.  -CP     LTG 4 Progress Not Met; Ongoing  -CP            Time Calculation    PT Goal Re-Cert Due Date 01/07/22  -CP           User Key  (r) = Recorded By, (t) = Taken By, (c) = Cosigned By    Initials Name Provider Type    CP Ute Dominguez PTA Physical Therapy Assistant                Therapy Education  Education Details: Patient was encouraged to let pain be her guide when performing HEP  Given: Symptoms/condition management  Program: New  How Provided: Verbal  Provided to: Patient  Level of Understanding: Verbalized              Time Calculation:   Start Time: 1102  Stop Time: 1144  Time Calculation (min): 42 min  Total Timed Code Minutes- PT: 39 minute(s)  Timed Charges  29920 - PT Therapeutic Exercise Minutes: 39  Total Minutes  Timed Charges Total Minutes: 39   Total Minutes: 39  Therapy Charges for Today     Code Description Service Date Service Provider Modifiers Qty    00309986737 HC PT THER PROC EA 15 MIN 12/27/2021 Ute Dominguez PTA GP 3                    Ute Dominguez PTA  12/27/2021

## 2021-12-29 ENCOUNTER — HOSPITAL ENCOUNTER (OUTPATIENT)
Dept: PHYSICAL THERAPY | Facility: HOSPITAL | Age: 56
Setting detail: THERAPIES SERIES
Discharge: HOME | End: 2021-12-29

## 2021-12-29 DIAGNOSIS — M75.111 NONTRAUMATIC INCOMPLETE TEAR OF RIGHT ROTATOR CUFF: ICD-10-CM

## 2021-12-29 DIAGNOSIS — M75.101 ROTATOR CUFF SYNDROME OF RIGHT SHOULDER: Primary | ICD-10-CM

## 2021-12-29 PROCEDURE — 97110 THERAPEUTIC EXERCISES: CPT

## 2021-12-29 NOTE — THERAPY TREATMENT NOTE
Outpatient Physical Therapy Ortho Treatment Note  [facility]     Patient Name: Alysia Deutsch  : 1965  MRN: [id number]  Today's Date: 2021      Visit Date: 2021     Subjective Improvement 60%  Visits 10/10  Visits approved 30  RTMD 2022  Recert Date 2022    R subacromial decompression and distral clavicle resection 2021  Post op 5 weeks 2 days    Visit Dx:    ICD-10-CM ICD-9-CM   1. Rotator cuff syndrome of right shoulder  M75.101 726.10   2. Nontraumatic incomplete tear of right rotator cuff  M75.111 726.13       Patient Active Problem List   Diagnosis   • Idiopathic osteoporosis   • Solitary pulmonary nodule   • Adenocarcinoma of left lung (HCC)   • Panlobular emphysema (HCC)   • Mixed hyperlipidemia   • Benign essential HTN   • Generalized abdominal pain   • Other constipation   • Gastroesophageal reflux disease   • Chronic low back pain   • Rotator cuff syndrome, right   • Right hip pain   • Tear of right rotator cuff   • Lateral epicondylitis, right elbow   • Lumbar degenerative disc disease   • Nicotine dependence, cigarettes, with other nicotine-induced disorders   • S/P arthroscopy of right shoulder        Past Medical History:   Diagnosis Date   • Anxiety    • Back pain    • Cancer (HCC)     lung   • Hepatitis    • Lung mass    • PONV (postoperative nausea and vomiting)    • Shoulder pain, right         Past Surgical History:   Procedure Laterality Date   • BREAST AUGMENTATION     • COLONOSCOPY N/A 10/27/2020    Procedure: COLONOSCOPY;  Surgeon: Carlos Enrique Alcantara MD;  Location: [facility] ENDOSCOPY;  Service: Gastroenterology;  Laterality: N/A;   • ENDOSCOPY N/A 10/27/2020    Procedure: ESOPHAGOGASTRODUODENOSCOPY;  Surgeon: Carlos Enrique Alcantara MD;  Location: [facility] ENDOSCOPY;  Service: Gastroenterology;  Laterality: N/A;   • LAPAROSCOPIC TUBAL LIGATION     • LUNG LOBECTOMY Left 2020    LOWER   • SHOULDER ARTHROSCOPY Right 2021    Procedure: ARTHROSCOPY  OF THE RIGHT SHOULDER WITH SUBACROMIAL DECOMPRESSION, CRISTAL PROCEDURE;  Surgeon: Dario Wilson MD;  Location: [facility];  Service: Orthopedics;  Laterality: Right;   • SPINAL FUSION Right 06/07/2021    R SI joint fusion   • THORACOSCOPY Left 8/20/2020    Procedure: LEFT THORACOSCOPY VIDEO ASSISTED THORACOTOMY pulmonary resection thoracic mediastinal lymphadenectomy bronchoscopy;  Surgeon: Horace Christiansen MD;  Location: [facility] OR;  Service: Cardiothoracic;  Laterality: Left;  [place] [phone]  VATS [phone]        PT Ortho     Row Name 12/29/21 1300       Subjective Comments    Subjective Comments patient states that her shoulder is feeling a lot better that last therapy appointment  -CP       Precautions and Contraindications    Precautions R SAD/[place] 11/22/21  -CP    Contraindications L lobectomy due to lung cancer August 2020  -CP       Subjective Pain    Able to rate subjective pain? yes  -CP    Pre-Treatment Pain Level 2  -CP    Post-Treatment Pain Level --  numb from ice  -CP       Posture/Observations    Posture/Observations Comments No sling noted; rounded shoulder posture; TTP R bicep proximal tendon and tightness in R pec  -CP          User Key  (r) = Recorded By, (t) = Taken By, (c) = Cosigned By    Initials Name Provider Type    CP Ute Dominguez, PTA Physical Therapy Assistant                             PT Assessment/Plan     Row Name 12/29/21 1357          PT Assessment    Assessment Comments patient presents with decrease in right shoulder this date.  Noreports of pain with todays treatments.  Full PROM for fl, ab and ER at 45 of AB  -CP            PT Plan    PT Frequency 2x/week  -CP     Predicted Duration of Therapy Intervention (PT) 4-6 weeks  -CP     PT Plan Comments Cont with POC.  AROM shoulder fl and scap with 1 lb  -CP           User Key  (r) = Recorded By, (t) = Taken By, (c) = Cosigned By    Initials Name Provider Type    CP Ute Dominguez, PTA Physical Therapy  "Assistant                 Modalities     Row Name 12/29/21 1300             Moist Heat    MH Applied Yes  -CP      Location right shoulder  -CP      PT Moist Heat Minutes 15  -CP      MH Prior to Rx Yes  -CP            User Key  (r) = Recorded By, (t) = Taken By, (c) = Cosigned By    Initials Name Provider Type    CP Ute Dominguez, PTA Physical Therapy Assistant               OP Exercises     Row Name 12/29/21 1359 12/29/21 1300          Subjective Comments    Subjective Comments -- patient states that her shoulder is feeling a lot better that last therapy appointment  -CP            Subjective Pain    Able to rate subjective pain? -- yes  -CP     Pre-Treatment Pain Level -- 2  -CP     Post-Treatment Pain Level -- --  numb from ice  -CP            Total Minutes    86598 - PT Therapeutic Exercise Minutes 43  -CP --            Exercise 1    Exercise Name 1 -- gianni II level 3  -CP     Time 1 -- 10  -CP     Additional Comments -- UE/LE  -CP            Exercise 2    Exercise Name 2 -- pulleys fl and scap  -CP     Cueing 2 -- Verbal  -CP     Reps 2 -- 20 each  -CP            Exercise 3    Exercise Name 3 -- doorway stretch  -CP     Cueing 3 -- Verbal; Demo  -CP     Sets 3 -- 3  -CP     Time 3 -- 30  -CP     Additional Comments -- hands low  -CP            Exercise 4    Exercise Name 4 -- doorway magnets PNF  -CP     Cueing 4 -- Verbal; Demo  -CP     Sets 4 -- 1  -CP     Reps 4 -- 10 each direction  -CP            Exercise 5    Exercise Name 5 -- wall wipes \"W\"  -CP     Cueing 5 -- Verbal; Demo  -CP     Sets 5 -- 1  -CP     Reps 5 -- 10  -CP     Time 5 -- 1 lb DB  -CP            Exercise 6    Exercise Name 6 -- SL\"ing AB to 90  -CP     Cueing 6 -- Verbal; Demo  -CP     Sets 6 -- 2  -CP     Reps 6 -- 10  -CP     Time 6 -- 1 lb DB  -CP            Exercise 7    Exercise Name 7 -- SL'ing ER  -CP     Cueing 7 -- Verbal; Demo  -CP     Sets 7 -- 2  -CP     Reps 7 -- 10  -CP     Time 7 -- 1 lb DB  -CP            Exercise 8    " Exercise Name 8 -- Sl'ing AD with tband  -CP     Cueing 8 -- Verbal; Tactile  -CP     Sets 8 -- 2  -CP     Reps 8 -- 10  -CP     Time 8 -- red tband  -CP            Exercise 9    Exercise Name 9 -- PROM  -CP     Time 9 -- 5  -CP           User Key  (r) = Recorded By, (t) = Taken By, (c) = Cosigned By    Initials Name Provider Type    CP Ute Dominguez, PTA Physical Therapy Assistant                              PT OP Goals     Row Name 12/29/21 1300          PT Short Term Goals    STG Date to Achieve 01/07/21  -CP     STG 1 Note a >/= 50% subjective improvement.  -CP     STG 1 Progress Met  -CP     STG 2 R shoulder PROM WNLs.  -CP     STG 2 Progress Met  -CP     STG 3 R shoulder active flexion and abduction to be >/= 90 deg each.  -CP     STG 3 Progress Met  -CP            Long Term Goals    LTG Date to Achieve 01/28/22  -CP     LTG 1 Independent with HEP.  -CP     LTG 1 Progress Not Met; Ongoing  -CP     LTG 2 R shoulder AROM WFLs.  -CP     LTG 2 Progress Not Met; Ongoing  -CP     LTG 3 QuickDASH score to be </= 20.  -CP     LTG 3 Progress Not Met; Ongoing  -CP     LTG 4 Complete ADLs and IADLs with minimal discomfort.  -CP     LTG 4 Progress Not Met; Ongoing  -CP            Time Calculation    PT Goal Re-Cert Due Date 01/07/22  -CP           User Key  (r) = Recorded By, (t) = Taken By, (c) = Cosigned By    Initials Name Provider Type    CP Ute Dominguez, PTA Physical Therapy Assistant                               Time Calculation:   Start Time: 1100  Stop Time: 1200  Time Calculation (min): 60 min  Total Timed Code Minutes- PT: 43 minute(s)  Timed Charges  12849 - PT Therapeutic Exercise Minutes: 43  Untimed Charges  PT Moist Heat Minutes: 15  Total Minutes  Timed Charges Total Minutes: 43  Untimed Charges Total Minutes: 15   Total Minutes: 43  Therapy Charges for Today     Code Description Service Date Service Provider Modifiers Qty    02402877712 HC PT THER SUPP EA 15 MIN 12/29/2021 Ute Dominguez,  PTA GP 1    [id number]  PT THER PROC EA 15 MIN 12/29/2021 Ute Dominguez, AMADO GP 3                    Ute Dominguez, AMADO  12/29/2021

## 2022-01-03 ENCOUNTER — APPOINTMENT (OUTPATIENT)
Dept: PHYSICAL THERAPY | Facility: HOSPITAL | Age: 57
End: 2022-01-03

## 2022-01-04 ENCOUNTER — OFFICE VISIT (OUTPATIENT)
Dept: ORTHOPEDIC SURGERY | Facility: CLINIC | Age: 57
End: 2022-01-04

## 2022-01-04 VITALS — HEIGHT: 63 IN | BODY MASS INDEX: 22.86 KG/M2 | WEIGHT: 129 LBS

## 2022-01-04 DIAGNOSIS — Z98.890 S/P ARTHROSCOPY OF RIGHT SHOULDER: Primary | ICD-10-CM

## 2022-01-04 DIAGNOSIS — M75.101 ROTATOR CUFF SYNDROME, RIGHT: ICD-10-CM

## 2022-01-04 PROCEDURE — 99024 POSTOP FOLLOW-UP VISIT: CPT | Performed by: NURSE PRACTITIONER

## 2022-01-04 RX ORDER — IBUPROFEN 800 MG/1
800 TABLET ORAL 3 TIMES DAILY
Qty: 270 TABLET | Refills: 3 | Status: SHIPPED | OUTPATIENT
Start: 2022-01-04 | End: 2022-04-12 | Stop reason: ALTCHOICE

## 2022-01-04 NOTE — PROGRESS NOTES
"[facility]up    Name:  Alysia Deutsch  Date:  2022  :  1965    Chief Complaint:    Chief Complaint   Patient presents with   • Right Shoulder - Follow-up     Date of surgery:     21 (6w 1d) Dario Wilson MD    Arthroscopy Of The Right Shoulder With Subacromial Decompression, [place] Procedure - Right         Procedure:    History of Present Illness: This 56-year-old female patient presents today for a 6-week follow-up status post right shoulder arthroscopy with subacromial depression.  Patient reports her pain has improved since the procedure but is still present.  The patient states \"I was hoping to be further along in this procedure and my pain be better than it is at this point\".  The patient reports she has improved her range of motion and pain since the procedure.  The patient states \"my pain is different than before \".  The patient continues to attend physical therapy and states it is improving with age visit.  Pain scale today 2/10.  Patient denies numbness or tingling.  Patient denies fever or chills.        Current Outpatient Medications:   •  acetaminophen (TYLENOL) 500 MG tablet, Take 1,000 mg by mouth Every 6 (Six) Hours As Needed for Mild Pain ., Disp: , Rfl:   •  albuterol sulfate  (90 Base) MCG/ACT inhaler, Two puffs per day every 4-6 hours as needed for wheeze or shortness of breath., Disp: 18 g, Rfl: 0  •  FLUoxetine (PROzac) 10 MG tablet, Take 10 mg by mouth Daily., Disp: , Rfl:   •  fluticasone (FLONASE) 50 MCG/ACT nasal spray, 1 spray into the nostril(s) as directed by provider Daily., Disp: , Rfl:   •  ibuprofen (ADVIL,MOTRIN) 200 MG tablet, Take 400 mg by mouth Every 6 (Six) Hours As Needed for Mild Pain ., Disp: , Rfl:   •  Loratadine 10 MG capsule, Take 10 mg by mouth Daily., Disp: , Rfl:   •  temazepam (RESTORIL) 15 MG capsule, Take 15-30 mg by mouth Every Night., Disp: , Rfl:   •  azithromycin (Zithromax Z-Pierce) 250 MG tablet, Take 2 tablets the " "first day, then 1 tablet daily for 4 days., Disp: 6 tablet, Rfl: 0  •  predniSONE (DELTASONE) 20 MG tablet, Day 1-5 take 1 tab po three times a day every 8 hours, day 6-10 take 1 tab po two times a day, day 11-15 take one tab by mouth daily., Disp: 30 tablet, Rfl: 0  •  promethazine-dextromethorphan (PROMETHAZINE-DM) 6.25-15 MG/5ML syrup, Take 5 mL by mouth 4 (Four) Times a Day As Needed for Cough., Disp: 118 mL, Rfl: 0    Allergies   Allergen Reactions   • Codeine Hives and Itching   • Morphine Nausea And Vomiting     Severe vomiting   • Adhesive Tape Rash         Exam:  Vitals:    01/04/22 1501   Weight: 58.5 kg (129 lb)   Height: 160 cm (63\")       The patient is awake, alert, and oriented and in no apparent distress.    Right upper extremity: Abduction approximately 90 degrees, forward flexion approximately 160 degrees, extension approximately 40 degrees, external rotation approximately 35 to 40 degrees.  Passive range of motion with abduction approximately 140 degrees, pain is elicited about 130 degrees.  Distal pulses palpable and present.  Skin warm, dry and intact.  Surgical incisions healed well.        Assessment:  Diagnoses and all orders for this visit:    S/P arthroscopy of right shoulder    Rotator cuff syndrome, right      Plan:  We discussed patient's progress, patient seems frustrated that she has not progressed further at this time.  Patient advised that she is only 6 weeks out and it can take up to 12 weeks to heal.  Patient encouraged to continue her physical therapy over the next 6 weeks and not to overdo it but to continue to do strength training and home exercises to improve her strength and flexibility.  Patient also advised not to overdo it and she should allow pain to be her guide.  Encourage patient to slowly introduce weights for muscle mass gain as well.  Patient is currently taking over-the-counter ibuprofen after discussion, we agreed that I would call in 800 mg ibuprofen which she can " take 3 times a day.  The patient was advised not to take any other NSAIDs with this medications including ibuprofen, Naprosyn, naproxen, Advil, diclofenac and/or meloxicam.  Encourage patient to continue to rest, ice and elevate as needed for pain, inflammation and swelling.  Advised patient to follow-up in 6 weeks or sooner as needed if symptoms change or worsen.  Patient will remain off work until next follow-up visit.    All questions and concerns are addressed with understanding noted. They are aware and are in agreement to this plan.    Return in about 6 weeks (around 2/15/2022), or if symptoms worsen or fail to improve, for Recheck.    01/04/22 at 15:01 CST by MICHAEL Gates     This document has been electronically signed by MICHAEL Gates on January 4, 2022 15:46 CST      EMR Dragon/Transciption Disclaimer: Some of this note may be an electronic transcription/translation of spoken language to printed text.  The electronic translation of spoken language may permit erroneous, or at times, nonsensical words or phrases to be inadvertently transcribed. Although I have reviewed the note for such errors, some may still exist.

## 2022-01-05 ENCOUNTER — HOSPITAL ENCOUNTER (OUTPATIENT)
Dept: PHYSICAL THERAPY | Facility: HOSPITAL | Age: 57
Setting detail: THERAPIES SERIES
Discharge: HOME | End: 2022-01-05

## 2022-01-05 DIAGNOSIS — M75.111 NONTRAUMATIC INCOMPLETE TEAR OF RIGHT ROTATOR CUFF: ICD-10-CM

## 2022-01-05 DIAGNOSIS — M75.101 ROTATOR CUFF SYNDROME OF RIGHT SHOULDER: Primary | ICD-10-CM

## 2022-01-05 PROCEDURE — 97110 THERAPEUTIC EXERCISES: CPT

## 2022-01-05 NOTE — THERAPY TREATMENT NOTE
Outpatient Physical Therapy Ortho Treatment Note  [facility]     Patient Name: Alysia Deutsch  : 1965  MRN: [id number]  Today's Date: 2022      Visit Date: 2022     Subjective Improvement 60%  Visits   Visits approved 30  RTMD 2-  Recert Date 2022    S/P R Subacromial decompression and distal clavicle resection on 2021  Post op 6 weeks 2 days    Visit Dx:    ICD-10-CM ICD-9-CM   1. Rotator cuff syndrome of right shoulder  M75.101 726.10   2. Nontraumatic incomplete tear of right rotator cuff  M75.111 726.13       Patient Active Problem List   Diagnosis   • Idiopathic osteoporosis   • Solitary pulmonary nodule   • Adenocarcinoma of left lung (HCC)   • Panlobular emphysema (HCC)   • Mixed hyperlipidemia   • Benign essential HTN   • Generalized abdominal pain   • Other constipation   • Gastroesophageal reflux disease   • Chronic low back pain   • Rotator cuff syndrome, right   • Right hip pain   • Tear of right rotator cuff   • Lateral epicondylitis, right elbow   • Lumbar degenerative disc disease   • Nicotine dependence, cigarettes, with other nicotine-induced disorders   • S/P arthroscopy of right shoulder        Past Medical History:   Diagnosis Date   • Anxiety    • Back pain    • Cancer (HCC)     lung   • Hepatitis    • Lung mass    • PONV (postoperative nausea and vomiting)    • Shoulder pain, right         Past Surgical History:   Procedure Laterality Date   • BREAST AUGMENTATION     • COLONOSCOPY N/A 10/27/2020    Procedure: COLONOSCOPY;  Surgeon: Carlos Enrique Alcantara MD;  Location: [facility] ENDOSCOPY;  Service: Gastroenterology;  Laterality: N/A;   • ENDOSCOPY N/A 10/27/2020    Procedure: ESOPHAGOGASTRODUODENOSCOPY;  Surgeon: Carlos Enrique Alcantara MD;  Location: [facility] ENDOSCOPY;  Service: Gastroenterology;  Laterality: N/A;   • LAPAROSCOPIC TUBAL LIGATION     • LUNG LOBECTOMY Left 2020    LOWER   • SHOULDER ARTHROSCOPY Right 2021    Procedure:  "ARTHROSCOPY OF THE RIGHT SHOULDER WITH SUBACROMIAL DECOMPRESSION, [place] PROCEDURE;  Surgeon: Dario Wilson MD;  Location: [facility] OR;  Service: Orthopedics;  Laterality: Right;   • SPINAL FUSION Right 06/07/2021    R SI joint fusion   • THORACOSCOPY Left 8/20/2020    Procedure: LEFT THORACOSCOPY VIDEO ASSISTED THORACOTOMY pulmonary resection thoracic mediastinal lymphadenectomy bronchoscopy;  Surgeon: Horace Christiansen MD;  Location: [facility] OR;  Service: Cardiothoracic;  Laterality: Left;  [place] [phone]  VATS [phone]        PT Ortho     Row Name 01/05/22 1100       Subjective Comments    Subjective Comments Patient states that she is doing better since starting on new antiflammatory meds.  She will remain off of work for 6 more weeks.  She hopes to built back her strength.  She still has a \"grinding in her shoulder with movements above shoulder level.  -CP       Precautions and Contraindications    Precautions R SAD/Juana 11/22/21  -CP    Contraindications L lobectomy due to lung cancer August 2020  -CP       Subjective Pain    Able to rate subjective pain? yes  -CP    Pre-Treatment Pain Level 1  -CP    Post-Treatment Pain Level 2  -CP    Subjective Pain Comment denies ice  -CP       Posture/Observations    Posture/Observations Comments No sling noted; rounded shoulder posture; TTP R bicep proximal tendon and tightness in R pec  -CP          User Key  (r) = Recorded By, (t) = Taken By, (c) = Cosigned By    Initials Name Provider Type    CP Ute Dominguez, PTA Physical Therapy Assistant                             PT Assessment/Plan     Row Name 01/05/22 1149          PT Assessment    Assessment Comments Patient has progressed very well.  She has full PROM for all motions.  Slight increase in pain after therapy.  -CP            PT Plan    PT Frequency 2x/week  -CP     Predicted Duration of Therapy Intervention (PT) 4-6 weeks  -CP     PT Plan Comments Cont with POC.  standing fl and scap to " "90,  wall push up  -CP           User Key  (r) = Recorded By, (t) = Taken By, (c) = Cosigned By    Initials Name Provider Type    Ute Iverson, AMADO Physical Therapy Assistant                   OP Exercises     Row Name 01/05/22 1150 01/05/22 1100          Subjective Comments    Subjective Comments -- Patient states that she is doing better since starting on new antiflammatory meds.  She will remain off of work for 6 more weeks.  She hopes to built back her strength.  She still has a \"grinding in her shoulder with movements above shoulder level.  -CP            Subjective Pain    Able to rate subjective pain? -- yes  -CP     Pre-Treatment Pain Level -- 1  -CP     Post-Treatment Pain Level -- 2  -CP     Subjective Pain Comment -- denies ice  -CP            Total Minutes    35078 - PT Therapeutic Exercise Minutes 42  -CP --            Exercise 1    Exercise Name 1 -- Pro II level 3  -CP     Time 1 -- 10  -CP     Additional Comments -- UE/LE  -CP            Exercise 2    Exercise Name 2 -- Pulleys fl and scap  -CP     Cueing 2 -- Verbal  -CP     Reps 2 -- 20 each  -CP            Exercise 3    Exercise Name 3 -- doorway PNF  -CP     Cueing 3 -- Verbal; Demo  -CP     Sets 3 -- 1  -CP     Reps 3 -- 10 each  -CP            Exercise 4    Exercise Name 4 -- wall wipes fl only  -CP     Cueing 4 -- Verbal; Demo  -CP     Sets 4 -- 2  -CP     Reps 4 -- 10  -CP     Time 4 -- 1 lb DB  -CP            Exercise 5    Exercise Name 5 -- scap rows mid  -CP     Cueing 5 -- Verbal; Demo  -CP     Sets 5 -- 2  -CP     Reps 5 -- 10  -CP     Time 5 -- green  -CP            Exercise 6    Exercise Name 6 -- shoulder ext with tband  -CP     Cueing 6 -- Verbal; Demo  -CP     Sets 6 -- 2  -CP     Reps 6 -- 10  -CP     Time 6 -- red  -CP            Exercise 7    Exercise Name 7 -- lat pull downs with tband  -CP     Cueing 7 -- Verbal; Demo  -CP     Sets 7 -- 10  -CP     Reps 7 -- yellow  -CP            Exercise 8    Exercise Name 8 -- wall " circles CW/CCW  -CP     Cueing 8 -- Verbal; Demo  -CP     Reps 8 -- 20  -CP            Exercise 9    Exercise Name 9 -- supine chest press AROM  -CP     Cueing 9 -- Verbal  -CP     Sets 9 -- 2  -CP     Reps 9 -- 10  -CP     Time 9 -- 2 lb DB  -CP            Exercise 10    Exercise Name 10 -- PROM  -CP     Time 10 -- 5  -CP            Exercise 11    Exercise Name 11 -- prone scap rows  -CP     Cueing 11 -- Verbal; Tactile  -CP     Sets 11 -- 2  -CP     Reps 11 -- 10  -CP     Time 11 -- 2 lb DB  -CP           User Key  (r) = Recorded By, (t) = Taken By, (c) = Cosigned By    Initials Name Provider Type    CP Ute Dominguez, AMADO Physical Therapy Assistant                              PT OP Goals     Row Name 01/05/22 1100          PT Short Term Goals    STG Date to Achieve 01/07/21  -CP     STG 1 Note a >/= 50% subjective improvement.  -CP     STG 1 Progress Met  -CP     STG 2 R shoulder PROM WNLs.  -CP     STG 2 Progress Met  -CP     STG 3 R shoulder active flexion and abduction to be >/= 90 deg each.  -CP     STG 3 Progress Met  -CP            Long Term Goals    LTG Date to Achieve 01/28/22  -CP     LTG 1 Independent with HEP.  -CP     LTG 1 Progress Not Met; Ongoing  -CP     LTG 2 R shoulder AROM WFLs.  -CP     LTG 2 Progress Not Met; Ongoing  -CP     LTG 3 QuickDASH score to be </= 20.  -CP     LTG 3 Progress Not Met; Ongoing  -CP     LTG 4 Complete ADLs and IADLs with minimal discomfort.  -CP     LTG 4 Progress Not Met; Ongoing  -CP            Time Calculation    PT Goal Re-Cert Due Date 01/07/22  -CP           User Key  (r) = Recorded By, (t) = Taken By, (c) = Cosigned By    Initials Name Provider Type    CP Ute Dominguez, AMADO Physical Therapy Assistant                Therapy Education  Education Details: shoulder ext, scap rows mid, lat pull down with tband, wall wipes with can of vegetable.s  Given: HEP  Program: New  How Provided: Verbal, Demonstration, Written  Provided to: Patient  Level of  Understanding: Teach back education performed, Verbalized, Demonstrated              Time Calculation:   Start Time: 1058  Stop Time: 1140  Time Calculation (min): 42 min  Total Timed Code Minutes- PT: 42 minute(s)  Timed Charges  25645 - PT Therapeutic Exercise Minutes: 42  Total Minutes  Timed Charges Total Minutes: 42   Total Minutes: 42  Therapy Charges for Today     Code Description Service Date Service Provider Modifiers Qty    03335988657 HC PT THER PROC EA 15 MIN 1/5/2022 Ute Dominguez, PTA GP 3                    Ute Dominguez PTA  1/5/2022

## 2022-01-10 ENCOUNTER — HOSPITAL ENCOUNTER (OUTPATIENT)
Dept: PHYSICAL THERAPY | Facility: HOSPITAL | Age: 57
Setting detail: THERAPIES SERIES
Discharge: HOME | End: 2022-01-10

## 2022-01-10 DIAGNOSIS — M75.111 NONTRAUMATIC INCOMPLETE TEAR OF RIGHT ROTATOR CUFF: ICD-10-CM

## 2022-01-10 DIAGNOSIS — M75.101 ROTATOR CUFF SYNDROME OF RIGHT SHOULDER: Primary | ICD-10-CM

## 2022-01-10 PROCEDURE — 97110 THERAPEUTIC EXERCISES: CPT | Performed by: PHYSICAL THERAPIST

## 2022-01-12 ENCOUNTER — HOSPITAL ENCOUNTER (OUTPATIENT)
Dept: PHYSICAL THERAPY | Facility: HOSPITAL | Age: 57
Setting detail: THERAPIES SERIES
Discharge: HOME | End: 2022-01-12

## 2022-01-12 DIAGNOSIS — M75.101 ROTATOR CUFF SYNDROME OF RIGHT SHOULDER: Primary | ICD-10-CM

## 2022-01-12 DIAGNOSIS — M75.111 NONTRAUMATIC INCOMPLETE TEAR OF RIGHT ROTATOR CUFF: ICD-10-CM

## 2022-01-12 PROCEDURE — 97140 MANUAL THERAPY 1/> REGIONS: CPT | Performed by: PHYSICAL THERAPIST

## 2022-01-12 PROCEDURE — 97110 THERAPEUTIC EXERCISES: CPT | Performed by: PHYSICAL THERAPIST

## 2022-01-12 NOTE — THERAPY PROGRESS REPORT/RE-CERT
Outpatient Physical Therapy Ortho Progress Note  [facility]     Patient Name: Alysia Deutsch  : 1965  MRN: [id number]  Today's Date: 2022      Visit Date: 2022    Attendance:  (30/yr, 10 used in )  Subjective Improvement: 70%  Next MD Appt: 2/15/22  Recert Date: 22     Therapy Diagnosis: R subacromial decompression and distal clavicle resection 21    Changes in Medications: none noted  Changes in MD Orders: continue P.T.   Number of Work Days Lost: since surgery       Past Medical History:   Diagnosis Date   • Anxiety    • Back pain    • Cancer (HCC)     lung   • Hepatitis    • Lung mass    • PONV (postoperative nausea and vomiting)    • Shoulder pain, right         Past Surgical History:   Procedure Laterality Date   • BREAST AUGMENTATION     • COLONOSCOPY N/A 10/27/2020    Procedure: COLONOSCOPY;  Surgeon: Carlos Enrique Alcantara MD;  Location: [facility] ENDOSCOPY;  Service: Gastroenterology;  Laterality: N/A;   • ENDOSCOPY N/A 10/27/2020    Procedure: ESOPHAGOGASTRODUODENOSCOPY;  Surgeon: Carlos Enrique Alcantara MD;  Location: [facility] ENDOSCOPY;  Service: Gastroenterology;  Laterality: N/A;   • LAPAROSCOPIC TUBAL LIGATION     • LUNG LOBECTOMY Left 2020    LOWER   • SHOULDER ARTHROSCOPY Right 2021    Procedure: ARTHROSCOPY OF THE RIGHT SHOULDER WITH SUBACROMIAL DECOMPRESSION, [place] PROCEDURE;  Surgeon: Dario Wilson MD;  Location: [facility] OR;  Service: Orthopedics;  Laterality: Right;   • SPINAL FUSION Right 2021    R SI joint fusion   • THORACOSCOPY Left 2020    Procedure: LEFT THORACOSCOPY VIDEO ASSISTED THORACOTOMY pulmonary resection thoracic mediastinal lymphadenectomy bronchoscopy;  Surgeon: Horace Christiansen MD;  Location: [facility] OR;  Service: Cardiothoracic;  Laterality: Left;  [place] [phone]  VATS [phone]       Visit Dx:     ICD-10-CM ICD-9-CM   1. Rotator cuff syndrome of right shoulder  M75.101 726.10   2. Nontraumatic  "incomplete tear of right rotator cuff  M75.111 726.13              PT Ortho     Row Name 01/12/22 1100       Subjective Comments    Subjective Comments \"As long as I don't move it, it doesn't hurt.\" Gets achy if she lays on the shoulder. No longer having \"consistent\" pain. Sore after last therapy session. \"I am concerned that something doesn't feel right with it, and I can't pinpoint what that something is.\" Notices that shoulder pops or grinds, both of which are uncomfortable, with abduction and rotation. Feels like it's catching in those motions. 70% subjective improvement. No medication changes.  -       Precautions and Contraindications    Precautions R SAD/Juana 11/22/21  -    Contraindications L lobectomy due to lung cancer August 2020  -       Subjective Pain    Able to rate subjective pain? yes  -SS    Pre-Treatment Pain Level 0  -SS    Post-Treatment Pain Level --  \"sore but better\"  -SS       Posture/Observations    Posture/Observations Comments R scapula tipped.  -SS       Shoulder Impingement/Rotator Cuff Special Tests    Shoulder Impingement/Rotator Cuff Special Tests Comments R pectoralis tightness. TTP R pec and UT. Slight TTP R bicipital groove/biceps tendon. Increased tone R UT. TTP with trigger point R rhomboids. Decreased R scapular mobility.  -SS       Right Upper Ext    Rt Shoulder Abduction AROM 110 deg; soreness UT and lateral shoulder/brachium  125 deg \"not as uncomfortable with it\" at conclusion of treatment  -SS    Rt Shoulder Extension AROM 53 deg; uncomfortable superior and anterior shoulder  -SS    Rt Shoulder Flexion AROM 135 deg; popping in the shoulder  145 deg in supine during therex; 145 deg in sitting at conclusion of treatment  -SS    Rt Shoulder External Rotation AROM 88 deg in supine 90/90 position; sore and popping in the shoulder  -SS    Rt Shoulder Internal Rotation AROM Functional IR: radial border of R hand to T11/12  -          User Key  (r) = Recorded By, (t) = " Taken By, (c) = Cosigned By    Initials Name Provider Type     Puma Lauren, PT DPT Physical Therapist                            Therapy Education  Education Details: dry needling; HEP: towel IR stretch, IR wipers, supine abduction, supine progressing to inclined flexion  Given: HEP, Other (comment)  Program: Modified  How Provided: Verbal, Demonstration  Provided to: Patient  Level of Understanding: Verbalized, Demonstrated      PT OP Goals     Row Name 01/12/22 1100          PT Short Term Goals    STG Date to Achieve --  further STGs deferred  -     STG 1 Note a >/= 50% subjective improvement.  -SS     STG 1 Progress Met  -     STG 2 R shoulder PROM WNLs.  -SS     STG 2 Progress Met  -     STG 3 R shoulder active flexion and abduction to be >/= 90 deg each.  -     STG 3 Progress Met  -            Long Term Goals    LTG Date to Achieve 02/09/22  -     LTG 1 Independent with HEP.  -     LTG 1 Progress Not Met; Ongoing  -     LTG 2 R shoulder AROM WFLs.  -     LTG 2 Progress Not Met; Ongoing  -     LTG 3 QuickDASH score to be </= 20.  -     LTG 3 Progress Not Met; Ongoing  -     LTG 4 Complete ADLs and IADLs with minimal discomfort.  -     LTG 4 Progress Not Met; Ongoing  -            Time Calculation    PT Goal Re-Cert Due Date 02/09/22  -           User Key  (r) = Recorded By, (t) = Taken By, (c) = Cosigned By    Initials Name Provider Type     Puma Lauren, PT DPT Physical Therapist                 PT Assessment/Plan     Row Name 01/12/22 1100          PT Assessment    Functional Limitations Limitation in home management; Limitations in functional capacity and performance; Limitations in community activities; Performance in leisure activities; Performance in self-care ADL; Performance in work activities  -     Impairments Joint mobility; Pain; Range of motion  -     Assessment Comments Patient continues to have popping and pain in the shoulder. Her motion  "in gradually improving. Tolerates dry needling well. Twitch response noted in UT and rhomboids with dry needling. Muscle relaxation and decreased pain R pec with manual release. Improved motion post-treatment.  -SS     Rehab Potential Good  -SS     Patient/caregiver participated in establishment of treatment plan and goals Yes  -SS     Patient would benefit from skilled therapy intervention Yes  -SS            PT Plan    PT Frequency 2x/week  -     Predicted Duration of Therapy Intervention (PT) 4 weeks  -     PT Plan Comments AROM (supine, inclined, upright), stretching, scapular and shoulder strengthening, thoracic and glenohumeral mobilization as indicated, dry needling as indicated, MFR/manual release, heat pretreatment as needed.  -           User Key  (r) = Recorded By, (t) = Taken By, (c) = Cosigned By    Initials Name Provider Type    SS Puma Lauren, PT DPT Physical Therapist                   OP Exercises     Row Name 01/12/22 1100             Subjective Comments    Subjective Comments \"As long as I don't move it, it doesn't hurt.\" Gets achy if she lays on the shoulder. No longer having \"consistent\" pain. Sore after last therapy session. \"I am concerned that something doesn't feel right with it, and I can't pinpoint what that something is.\" Notices that shoulder pops or grinds, both of which are uncomfortable, with abduction and rotation. Feels like it's catching in those motions. 70% subjective improvement. No medication changes.  -              Subjective Pain    Able to rate subjective pain? yes  -SS      Pre-Treatment Pain Level 0  -SS      Post-Treatment Pain Level --  \"sore but better\"  -              Exercise 1    Exercise Name 1 recheck  -SS              Exercise 2    Exercise Name 2 see Manual  -SS              Exercise 3    Exercise Name 3 Sidelying scapular retraction AAROM  -      Cueing 3 Verbal; Tactile  -      Sets 3 1  -SS      Reps 3 30  -SS              Exercise 4 "    Exercise Name 4 Sidelying scapular depression AAROM  -SS      Cueing 4 Verbal; Tactile  -SS      Sets 4 1  -SS      Reps 4 30  -SS              Exercise 5    Exercise Name 5 Sidelying shoulder abduction AROM  -SS      Cueing 5 Verbal; Tactile  -SS      Sets 5 1  -SS      Reps 5 15  -SS              Exercise 6    Exercise Name 6 Supine shoulder flexion AROM  -SS      Cueing 6 Verbal; Demo  -SS      Sets 6 1  -SS      Reps 6 20  -SS            User Key  (r) = Recorded By, (t) = Taken By, (c) = Cosigned By    Initials Name Provider Type     Puma Lauren, PT DPT Physical Therapist              Manual Rx (last 36 hours)     Manual Treatments     Row Name 01/12/22 1100             Manual Rx 1    Manual Rx 1 Location R rhomboid  -SS      Manual Rx 1 Type dry needling & MFR  -SS              Manual Rx 2    Manual Rx 2 Location R subscapularis medially  -SS      Manual Rx 2 Type dry needling & MFR  -SS              Manual Rx 3    Manual Rx 3 Location R UT  -SS      Manual Rx 3 Type dry needling & MFR  -SS              Manual Rx 4    Manual Rx 4 Location thoracic spine  -SS      Manual Rx 4 Type prone and supine joint mobilization  -SS      Manual Rx 4 Grade 3  -SS              Manual Rx 5    Manual Rx 5 Location R scapula  -SS      Manual Rx 5 Type joint mobilization  -SS      Manual Rx 5 Grade 4  -SS              Manual Rx 6    Manual Rx 6 Location R pec  -SS      Manual Rx 6 Type manual release  -SS      Manual Rx 6 Duration 5 mins  -SS            User Key  (r) = Recorded By, (t) = Taken By, (c) = Cosigned By    Initials Name Provider Type     Puma Lauren, PT DPT Physical Therapist                                      Time Calculation:     Start Time: 1107  Stop Time: 1151  Time Calculation (min): 44 min     Therapy Charges for Today     Code Description Service Date Service Provider Modifiers Qty    53016650875  PT MANUAL THERAPY EA 15 MIN 1/12/2022 Puma Lauren, PT DPT GP 2     [id number]  PT THER PROC EA 15 MIN 1/12/2022 Puma Lauren, PT DPT GP 1                    Puma Lauren, PT, DPT, CHT  1/12/2022

## 2022-01-17 ENCOUNTER — HOSPITAL ENCOUNTER (OUTPATIENT)
Dept: PHYSICAL THERAPY | Facility: HOSPITAL | Age: 57
Setting detail: THERAPIES SERIES
Discharge: HOME | End: 2022-01-17

## 2022-01-17 DIAGNOSIS — M75.101 ROTATOR CUFF SYNDROME OF RIGHT SHOULDER: Primary | ICD-10-CM

## 2022-01-17 PROCEDURE — 97110 THERAPEUTIC EXERCISES: CPT

## 2022-01-17 NOTE — THERAPY TREATMENT NOTE
Outpatient Physical Therapy Ortho Treatment Note  [facility]     Patient Name: Alysia Deutsch  : 1965  MRN: [id number]  Today's Date: 2022      Visit Date: 2022     Subjective Improvement 75%  Visits 14/14  Visits approved 30  RTMD 2-  Recert Date 2022    Therapy Diagnosis: R subacromial decompression and distal clavicle resection 21    Visit Dx:    ICD-10-CM ICD-9-CM   1. Rotator cuff syndrome of right shoulder  M75.101 726.10       Patient Active Problem List   Diagnosis   • Idiopathic osteoporosis   • Solitary pulmonary nodule   • Adenocarcinoma of left lung (HCC)   • Panlobular emphysema (HCC)   • Mixed hyperlipidemia   • Benign essential HTN   • Generalized abdominal pain   • Other constipation   • Gastroesophageal reflux disease   • Chronic low back pain   • Rotator cuff syndrome, right   • Right hip pain   • Tear of right rotator cuff   • Lateral epicondylitis, right elbow   • Lumbar degenerative disc disease   • Nicotine dependence, cigarettes, with other nicotine-induced disorders   • S/P arthroscopy of right shoulder        Past Medical History:   Diagnosis Date   • Anxiety    • Back pain    • Cancer (HCC)     lung   • Hepatitis    • Lung mass    • PONV (postoperative nausea and vomiting)    • Shoulder pain, right         Past Surgical History:   Procedure Laterality Date   • BREAST AUGMENTATION     • COLONOSCOPY N/A 10/27/2020    Procedure: COLONOSCOPY;  Surgeon: Carlos Enrique Alcantara MD;  Location: [facility] ENDOSCOPY;  Service: Gastroenterology;  Laterality: N/A;   • ENDOSCOPY N/A 10/27/2020    Procedure: ESOPHAGOGASTRODUODENOSCOPY;  Surgeon: Carlos Enrique Alcantara MD;  Location: [facility] ENDOSCOPY;  Service: Gastroenterology;  Laterality: N/A;   • LAPAROSCOPIC TUBAL LIGATION     • LUNG LOBECTOMY Left 2020    LOWER   • SHOULDER ARTHROSCOPY Right 2021    Procedure: ARTHROSCOPY OF THE RIGHT SHOULDER WITH SUBACROMIAL DECOMPRESSION, CRISTAL PROCEDURE;   Surgeon: Dario Wilson MD;  Location: [facility];  Service: Orthopedics;  Laterality: Right;   • SPINAL FUSION Right 06/07/2021    R SI joint fusion   • THORACOSCOPY Left 8/20/2020    Procedure: LEFT THORACOSCOPY VIDEO ASSISTED THORACOTOMY pulmonary resection thoracic mediastinal lymphadenectomy bronchoscopy;  Surgeon: Horace Christiansen MD;  Location: [facility] OR;  Service: Cardiothoracic;  Laterality: Left;  Bronch [phone]  VATS [phone]        PT Ortho     Row Name 01/17/22 1000       Precautions and Contraindications    Precautions R SAD/Juana 11/22/21  -CP    Contraindications L lobectomy due to lung cancer August 2020  -CP       Subjective Pain    Able to rate subjective pain? yes  -CP    Pre-Treatment Pain Level 2  -CP       Posture/Observations    Posture/Observations Comments R scapula tipped.  -CP          User Key  (r) = Recorded By, (t) = Taken By, (c) = Cosigned By    Initials Name Provider Type    CP Ute Dominguez, AMADO Physical Therapy Assistant                             PT Assessment/Plan     Row Name 01/17/22 1107          PT Assessment    Assessment Comments Patient did have reports of popping in right shoulder with both PROM and AROM.  She does have full PROM for all motions.  -CP            PT Plan    PT Frequency 2x/week  -CP     Predicted Duration of Therapy Intervention (PT) 4 weeks  -CP     PT Plan Comments Cont with POC,  may try manual to right UT areas  -CP           User Key  (r) = Recorded By, (t) = Taken By, (c) = Cosigned By    Initials Name Provider Type    CP Ute Dominguez, AMADO Physical Therapy Assistant                   OP Exercises     Row Name 01/17/22 1108 01/17/22 1000          Subjective Comments    Subjective Comments -- Patient states that the cold weather does affect her pain not just in right shoulder but also hips and low back.  She feels that the needling did help some.  Her shoulder still pops but not as bad.  Patient does have pain when  sleeping and vaccuming  -CP            Subjective Pain    Able to rate subjective pain? -- yes  -CP     Pre-Treatment Pain Level -- 2  -CP     Post-Treatment Pain Level -- 3  -CP            Total Minutes    35172 - PT Therapeutic Exercise Minutes 40  -CP --            Exercise 1    Exercise Name 1 -- Pro II level 3  -CP     Time 1 -- 10  -CP     Additional Comments -- FW/BW  -CP            Exercise 2    Exercise Name 2 -- IR stretch with strap  -CP     Cueing 2 -- Verbal; Demo  -CP     Sets 2 -- 3  -CP     Time 2 -- 30  -CP            Exercise 3    Exercise Name 3 -- PROM  -CP     Time 3 -- 9  -CP            Exercise 4    Exercise Name 4 -- supine AROM fl  -CP     Cueing 4 -- Verbal; Tactile  -CP     Sets 4 -- 2  -CP     Reps 4 -- 10  -CP     Time 4 -- 1 lb DB  -CP            Exercise 5    Exercise Name 5 -- SL'ing AB with DB  -CP     Cueing 5 -- Verbal; Demo  -CP     Sets 5 -- 2  -CP     Reps 5 -- 10  -CP     Time 5 -- 1 lb DB  -CP            Exercise 6    Exercise Name 6 -- SL'ing ER  -CP     Cueing 6 -- Verbal; Tactile  -CP     Sets 6 -- 2  -CP     Reps 6 -- 10  -CP            Exercise 7    Exercise Name 7 -- Prone ext with DB  -CP     Cueing 7 -- Verbal; Demo; Tactile  -CP     Sets 7 -- 2  -CP     Reps 7 -- 10  -CP     Time 7 -- 1 lb DB  -CP            Exercise 8    Exercise Name 8 -- standing ext stretch with cane  -CP     Cueing 8 -- Verbal; Demo  -CP     Sets 8 -- 3  -CP     Time 8 -- 30  -CP            Exercise 9    Exercise Name 9 -- prone ab  -CP     Cueing 9 -- Verbal; Demo  -CP     Sets 9 -- 2  -CP     Reps 9 -- 10  -CP            Exercise 10    Exercise Name 10 -- prone scap rows  -CP     Cueing 10 -- Verbal; Tactile  -CP     Sets 10 -- 2  -CP     Reps 10 -- 10  -CP     Time 10 -- 2 lb  -CP           User Key  (r) = Recorded By, (t) = Taken By, (c) = Cosigned By    Initials Name Provider Type    Ute Iverson, PTA Physical Therapy Assistant                              PT OP Goals     Row Name  01/17/22 1000          PT Short Term Goals    STG Date to Achieve --  further STGs deferred  -CP     STG 1 Note a >/= 50% subjective improvement.  -CP     STG 1 Progress Met  -CP     STG 2 R shoulder PROM WNLs.  -CP     STG 2 Progress Met  -CP     STG 3 R shoulder active flexion and abduction to be >/= 90 deg each.  -CP     STG 3 Progress Met  -CP            Long Term Goals    LTG Date to Achieve 02/09/22  -CP     LTG 1 Independent with HEP.  -CP     LTG 1 Progress Not Met; Ongoing  -CP     LTG 2 R shoulder AROM WFLs.  -CP     LTG 2 Progress Not Met; Ongoing  -CP     LTG 3 QuickDASH score to be </= 20.  -CP     LTG 3 Progress Not Met; Ongoing  -CP     LTG 4 Complete ADLs and IADLs with minimal discomfort.  -CP     LTG 4 Progress Not Met; Ongoing  -CP            Time Calculation    PT Goal Re-Cert Due Date 02/09/22  -CP           User Key  (r) = Recorded By, (t) = Taken By, (c) = Cosigned By    Initials Name Provider Type    CP Ute Dominguez PTA Physical Therapy Assistant                               Time Calculation:   Start Time: 1020  Stop Time: 1100  Time Calculation (min): 40 min  Timed Charges  66044 - PT Therapeutic Exercise Minutes: 40  Total Minutes  Timed Charges Total Minutes: 40   Total Minutes: 40  Therapy Charges for Today     Code Description Service Date Service Provider Modifiers Qty    95562241544 HC PT THER PROC EA 15 MIN 1/17/2022 Ute Dominguez PTA GP, CQ 3                    Ute Dominguez PTA  1/17/2022

## 2022-01-19 ENCOUNTER — HOSPITAL ENCOUNTER (OUTPATIENT)
Dept: PHYSICAL THERAPY | Facility: HOSPITAL | Age: 57
Setting detail: THERAPIES SERIES
Discharge: HOME | End: 2022-01-19

## 2022-01-19 DIAGNOSIS — M75.111 NONTRAUMATIC INCOMPLETE TEAR OF RIGHT ROTATOR CUFF: ICD-10-CM

## 2022-01-19 DIAGNOSIS — M75.101 ROTATOR CUFF SYNDROME OF RIGHT SHOULDER: Primary | ICD-10-CM

## 2022-01-19 PROCEDURE — 97140 MANUAL THERAPY 1/> REGIONS: CPT

## 2022-01-19 PROCEDURE — 97110 THERAPEUTIC EXERCISES: CPT

## 2022-01-19 NOTE — THERAPY TREATMENT NOTE
Outpatient Physical Therapy Ortho Treatment Note  [facility]     Patient Name: Alysia Deutsch  : 1965  MRN: [id number]  Today's Date: 2022      Visit Date: 2022     Subjective Improvement 75-80%  Visits 15/15  Visits approved 30  RTMD 2-  Recert Date 2022    Visit Dx:    ICD-10-CM ICD-9-CM   1. Rotator cuff syndrome of right shoulder  M75.101 726.10   2. Nontraumatic incomplete tear of right rotator cuff  M75.111 726.13       Patient Active Problem List   Diagnosis   • Idiopathic osteoporosis   • Solitary pulmonary nodule   • Adenocarcinoma of left lung (HCC)   • Panlobular emphysema (HCC)   • Mixed hyperlipidemia   • Benign essential HTN   • Generalized abdominal pain   • Other constipation   • Gastroesophageal reflux disease   • Chronic low back pain   • Rotator cuff syndrome, right   • Right hip pain   • Tear of right rotator cuff   • Lateral epicondylitis, right elbow   • Lumbar degenerative disc disease   • Nicotine dependence, cigarettes, with other nicotine-induced disorders   • S/P arthroscopy of right shoulder        Past Medical History:   Diagnosis Date   • Anxiety    • Back pain    • Cancer (HCC)     lung   • Hepatitis    • Lung mass    • PONV (postoperative nausea and vomiting)    • Shoulder pain, right         Past Surgical History:   Procedure Laterality Date   • BREAST AUGMENTATION     • COLONOSCOPY N/A 10/27/2020    Procedure: COLONOSCOPY;  Surgeon: Carlos Enrique Alcantara MD;  Location: [facility] ENDOSCOPY;  Service: Gastroenterology;  Laterality: N/A;   • ENDOSCOPY N/A 10/27/2020    Procedure: ESOPHAGOGASTRODUODENOSCOPY;  Surgeon: Carlos Enrique Alcantara MD;  Location: [facility] ENDOSCOPY;  Service: Gastroenterology;  Laterality: N/A;   • LAPAROSCOPIC TUBAL LIGATION     • LUNG LOBECTOMY Left 2020    LOWER   • SHOULDER ARTHROSCOPY Right 2021    Procedure: ARTHROSCOPY OF THE RIGHT SHOULDER WITH SUBACROMIAL DECOMPRESSION, CRISTAL PROCEDURE;  Surgeon: Katie  Dario Charlton MD;  Location: [facility] OR;  Service: Orthopedics;  Laterality: Right;   • SPINAL FUSION Right 06/07/2021    R SI joint fusion   • THORACOSCOPY Left 8/20/2020    Procedure: LEFT THORACOSCOPY VIDEO ASSISTED THORACOTOMY pulmonary resection thoracic mediastinal lymphadenectomy bronchoscopy;  Surgeon: Horace Christiansen MD;  Location: [facility] OR;  Service: Cardiothoracic;  Laterality: Left;  [place] [phone]  VATS [phone]        PT Ortho     Row Name 01/19/22 1600       Subjective Comments    Subjective Comments Patient had to drive [place] an hour today and keeping her arm in the same position does increase her pain.  She does have trouble openning jars.  -CP       Precautions and Contraindications    Precautions R SAD/Juana 11/22/21  -CP    Contraindications L lobectomy due to lung cancer August 2020  -CP       Subjective Pain    Able to rate subjective pain? yes  -CP    Pre-Treatment Pain Level 4  -CP    Post-Treatment Pain Level 3  -CP       Posture/Observations    Posture/Observations Comments R scapula tipped.  -CP          User Key  (r) = Recorded By, (t) = Taken By, (c) = Cosigned By    Initials Name Provider Type    Ute Iverson, AMADO Physical Therapy Assistant                             PT Assessment/Plan     Row Name 01/19/22 9006          PT Assessment    Assessment Comments All STG's have rosemary met and she is progresing toward all LTS's.  TTP to R UT, R pec area and R teres.  She does have some popping in right shoulder at times with PROM AB however no popping with PROM scap.  -CP            PT Plan    PT Frequency 2x/week  -CP     Predicted Duration of Therapy Intervention (PT) 4 weeks  -CP     PT Plan Comments Cont with POC.  Monitor response to TrP  -CP           User Key  (r) = Recorded By, (t) = Taken By, (c) = Cosigned By    Initials Name Provider Type    Ute Iverson, AMADO Physical Therapy Assistant                   OP Exercises     Row Name 01/19/22 1688 01/19/22  1600          Subjective Comments    Subjective Comments -- Patient had to drive [place] an hour today and keeping her arm in the same position does increase her pain.  She does have trouble openning jars.  -CP            Subjective Pain    Able to rate subjective pain? -- yes  -CP     Pre-Treatment Pain Level -- 4  -CP     Post-Treatment Pain Level -- 3  -CP            Total Minutes    22236 - PT Therapeutic Exercise Minutes 45  -CP --            Exercise 1    Exercise Name 1 -- PRo I Ilevel 3  -CP     Time 1 -- 10  -CP     Additional Comments -- FW/BW  -CP            Exercise 2    Exercise Name 2 -- pullies fl and scap  -CP     Cueing 2 -- Verbal  -CP     Reps 2 -- 20 each  -CP            Exercise 3    Exercise Name 3 -- doorway stretch  -CP     Cueing 3 -- Verbal; Demo  -CP     Sets 3 -- 3  -CP     Time 3 -- 30  -CP            Exercise 4    Exercise Name 4 -- IR stretch with strap  -CP     Cueing 4 -- Verbal; Demo  -CP     Sets 4 -- 3  -CP     Time 4 -- 30  -CP            Exercise 5    Exercise Name 5 -- standing with UE at 90 fl and other UE performing AROM fl  -CP     Cueing 5 -- Verbal; Demo  -CP     Sets 5 -- 2  -CP     Reps 5 -- 10  -CP     Time 5 -- 1 LB in UE with AROM FL  -CP            Exercise 6    Exercise Name 6 -- wall [place] CW/CCW  -CP     Cueing 6 -- Verbal; Demo  -CP     Reps 6 -- 20 each way  -CP            Exercise 7    Exercise Name 7 -- supine rhythmic stab  -CP     Sets 7 -- 2  -CP     Reps 7 -- 10  -CP            Exercise 8    Exercise Name 8 -- see manual  -CP            Exercise 9    Exercise Name 9 -- PROM  -CP     Reps 9 -- 9  -CP            Exercise 10    Exercise Name 10 -- supine horz AB/AD with tband  -CP     Cueing 10 -- Verbal; Demo  -CP     Sets 10 -- 2  -CP     Reps 10 -- 10  -CP     Time 10 -- yellow  -CP           User Key  (r) = Recorded By, (t) = Taken By, (c) = Cosigned By    Initials Name Provider Type    Ute Iverson, PTA Physical Therapy Assistant                          Manual Rx (last 36 hours)     Manual Treatments     Row Name 01/19/22 1500             Manual Rx 1    Manual Rx 1 Location R UT  -CP      Manual Rx 1 Type TrP  -CP      Manual Rx 1 Duration 5  -CP              Manual Rx 2    Manual Rx 2 Location R pec  -CP      Manual Rx 2 Type TrP  -CP      Manual Rx 2 Duration 5  -CP              Manual Rx 3    Manual Rx 3 Location R teres  -CP      Manual Rx 3 Type TrP  -CP      Manual Rx 3 Duration 3  -CP            User Key  (r) = Recorded By, (t) = Taken By, (c) = Cosigned By    Initials Name Provider Type    CP Ute Dominguez, PTA Physical Therapy Assistant                 PT OP Goals     Row Name 01/19/22 1600          PT Short Term Goals    STG Date to Achieve --  further STGs deferred  -CP     STG 1 Note a >/= 50% subjective improvement.  -CP     STG 1 Progress Met  -CP     STG 2 R shoulder PROM WNLs.  -CP     STG 2 Progress Met  -CP     STG 3 R shoulder active flexion and abduction to be >/= 90 deg each.  -CP     STG 3 Progress Met  -CP            Long Term Goals    LTG Date to Achieve 02/09/22  -CP     LTG 1 Independent with HEP.  -CP     LTG 1 Progress Not Met; Ongoing  -CP     LTG 2 R shoulder AROM WFLs.  -CP     LTG 2 Progress Not Met; Ongoing  -CP     LTG 3 QuickDASH score to be </= 20.  -CP     LTG 3 Progress Not Met; Ongoing  -CP     LTG 4 Complete ADLs and IADLs with minimal discomfort.  -CP     LTG 4 Progress Not Met; Ongoing  -CP            Time Calculation    PT Goal Re-Cert Due Date 02/09/22  -CP           User Key  (r) = Recorded By, (t) = Taken By, (c) = Cosigned By    Initials Name Provider Type    CP Ute Dominguez, AMADO Physical Therapy Assistant                Therapy Education  Education Details: supine horz AB/AD with tband  Given: HEP  Program: New  How Provided: Verbal, Demonstration  Provided to: Patient  Level of Understanding: Teach back education performed, Verbalized, Demonstrated              Time Calculation:   Start Time:  1600  Stop Time: 1645  Time Calculation (min): 45 min  Total Timed Code Minutes- PT: 45 minute(s)  Timed Charges  98320 - PT Therapeutic Exercise Minutes: 45  Total Minutes  Timed Charges Total Minutes: 45   Total Minutes: 45  Therapy Charges for Today     Code Description Service Date Service Provider Modifiers Qty    75607092398 HC PT THER PROC EA 15 MIN 1/19/2022 Ute Dominguez, AMADO GP, CQ 2    15876762234 HC PT MANUAL THERAPY EA 15 MIN 1/19/2022 Ute Dominguez, AMADO GP, CQ 1                    Ute Dominguez PTA  1/19/2022

## 2022-01-24 ENCOUNTER — HOSPITAL ENCOUNTER (OUTPATIENT)
Dept: PHYSICAL THERAPY | Facility: HOSPITAL | Age: 57
Setting detail: THERAPIES SERIES
Discharge: HOME | End: 2022-01-24

## 2022-01-24 DIAGNOSIS — M75.101 ROTATOR CUFF SYNDROME OF RIGHT SHOULDER: Primary | ICD-10-CM

## 2022-01-24 DIAGNOSIS — M75.111 NONTRAUMATIC INCOMPLETE TEAR OF RIGHT ROTATOR CUFF: ICD-10-CM

## 2022-01-24 PROCEDURE — 97110 THERAPEUTIC EXERCISES: CPT

## 2022-01-24 NOTE — THERAPY TREATMENT NOTE
Outpatient Physical Therapy Ortho Treatment Note  [facility]     Patient Name: Alysia Deutsch  : 1965  MRN: [id number]  Today's Date: 2022      Visit Date: 2022     Subjective Improvement 80%  Visits 16/16  Visits approved 20  RTMD 2-  Recert Date 2022    S/P R SAD/Cristal on 2021    Visit Dx:    ICD-10-CM ICD-9-CM   1. Rotator cuff syndrome of right shoulder  M75.101 726.10   2. Nontraumatic incomplete tear of right rotator cuff  M75.111 726.13       Patient Active Problem List   Diagnosis   • Idiopathic osteoporosis   • Solitary pulmonary nodule   • Adenocarcinoma of left lung (HCC)   • Panlobular emphysema (HCC)   • Mixed hyperlipidemia   • Benign essential HTN   • Generalized abdominal pain   • Other constipation   • Gastroesophageal reflux disease   • Chronic low back pain   • Rotator cuff syndrome, right   • Right hip pain   • Tear of right rotator cuff   • Lateral epicondylitis, right elbow   • Lumbar degenerative disc disease   • Nicotine dependence, cigarettes, with other nicotine-induced disorders   • S/P arthroscopy of right shoulder        Past Medical History:   Diagnosis Date   • Anxiety    • Back pain    • Cancer (HCC)     lung   • Hepatitis    • Lung mass    • PONV (postoperative nausea and vomiting)    • Shoulder pain, right         Past Surgical History:   Procedure Laterality Date   • BREAST AUGMENTATION     • COLONOSCOPY N/A 10/27/2020    Procedure: COLONOSCOPY;  Surgeon: Carlos Enrique Alcantara MD;  Location: [facility] ENDOSCOPY;  Service: Gastroenterology;  Laterality: N/A;   • ENDOSCOPY N/A 10/27/2020    Procedure: ESOPHAGOGASTRODUODENOSCOPY;  Surgeon: Carlos Enrique Alcantara MD;  Location: [facility] ENDOSCOPY;  Service: Gastroenterology;  Laterality: N/A;   • LAPAROSCOPIC TUBAL LIGATION     • LUNG LOBECTOMY Left 2020    LOWER   • SHOULDER ARTHROSCOPY Right 2021    Procedure: ARTHROSCOPY OF THE RIGHT SHOULDER WITH SUBACROMIAL DECOMPRESSIONCRISTAL  PROCEDURE;  Surgeon: Dario Wilson MD;  Location: [facility];  Service: Orthopedics;  Laterality: Right;   • SPINAL FUSION Right 06/07/2021    R SI joint fusion   • THORACOSCOPY Left 8/20/2020    Procedure: LEFT THORACOSCOPY VIDEO ASSISTED THORACOTOMY pulmonary resection thoracic mediastinal lymphadenectomy bronchoscopy;  Surgeon: Horace Christiansen MD;  Location: [facility] OR;  Service: Cardiothoracic;  Laterality: Left;  Bron[place] [phone]  VATS [phone]        PT Ortho     Row Name 01/24/22 1100       Subjective Comments    Subjective Comments Patient does report some decrease pain today.  She is feeling about 80% better.  -CP       Precautions and Contraindications    Precautions R SAD/Juana 11/22/21  -CP    Contraindications L lobectomy due to lung cancer August 2020  -CP       Subjective Pain    Able to rate subjective pain? yes  -CP    Pre-Treatment Pain Level 2  -CP    Post-Treatment Pain Level 2  -CP       Right Upper Ext    Rt Shoulder Abduction AROM 138 sore no popping  -CP    Rt Shoulder Flexion AROM 155 no popping  -CP    Rt Shoulder External Rotation AROM at side 78 pulling in the front  -CP    Rt Shoulder Internal Rotation AROM T11/12 uncomfortable  -CP          User Key  (r) = Recorded By, (t) = Taken By, (c) = Cosigned By    Initials Name Provider Type    Ute Iverson, PTA Physical Therapy Assistant                             PT Assessment/Plan     Row Name 01/24/22 1156          PT Assessment    Assessment Comments All STG's have been met.  Patient is reporting decrease pain in right shoulder and no increase pain with ther ex.  She reported no popping in Right shoulder with AROM or PROM.  Increase AROM noted this date  -CP            PT Plan    PT Frequency 2x/week  -CP     Predicted Duration of Therapy Intervention (PT) 4 weeks  -CP     PT Plan Comments Cont with POC.  scap strengthening as tolerated  -CP           User Key  (r) = Recorded By, (t) = Taken By, (c) = Cosigned By     Initials Name Provider Type    CP Ute Dominguez PTA Physical Therapy Assistant                 Modalities     Row Name 01/24/22 1100             Subjective Pain    Subjective Pain Comment denies ice  -CP            User Key  (r) = Recorded By, (t) = Taken By, (c) = Cosigned By    Initials Name Provider Type    CP Johny Dominguezcollins HERRERA PTA Physical Therapy Assistant               OP Exercises     Row Name 01/24/22 1157 01/24/22 1100          Subjective Comments    Subjective Comments -- Patient does report some decrease pain today.  She is feeling about 80% better.  -CP            Subjective Pain    Able to rate subjective pain? -- yes  -CP     Pre-Treatment Pain Level -- 2  -CP     Post-Treatment Pain Level -- 2  -CP     Subjective Pain Comment -- denies ice  -CP            Total Minutes    50691 - PT Therapeutic Exercise Minutes 43  -CP --            Exercise 1    Exercise Name 1 -- Pro II level 3  -CP     Time 1 -- 10  -CP     Additional Comments -- FW/BW  -CP            Exercise 2    Exercise Name 2 -- pullies fl and Ab  -CP     Cueing 2 -- Verbal  -CP     Reps 2 -- 20 each  -CP            Exercise 3    Exercise Name 3 -- cybex row  -CP     Cueing 3 -- Verbal; Demo  -CP     Sets 3 -- 2  -CP     Reps 3 -- 10  -CP     Time 3 -- 30 lb  -CP            Exercise 4    Exercise Name 4 -- cybex lat pull downs  -CP     Cueing 4 -- Verbal; Demo  -CP     Sets 4 -- 2  -CP     Reps 4 -- 10  -CP     Time 4 -- 30 lb  -CP            Exercise 5    Exercise Name 5 -- ER/IR walk outs with tband  -CP     Cueing 5 -- Verbal; Tactile  -CP     Reps 5 -- 5 each  -CP     Time 5 -- red tband  -CP            Exercise 6    Exercise Name 6 -- wall walk with lift off  -CP     Cueing 6 -- Verbal; Demo  -CP     Sets 6 -- 2  -CP     Reps 6 -- 10  -CP            Exercise 7    Exercise Name 7 -- prone horz AB  -CP     Cueing 7 -- Verbal; Demo; Tactile  -CP     Sets 7 -- 2  -CP     Reps 7 -- 10  -CP            Exercise 8    Exercise Name 8 --  PROM  -CP     Time 8 -- 8  -CP            Exercise 9    Exercise Name 9 -- measurements  -CP           User Key  (r) = Recorded By, (t) = Taken By, (c) = Cosigned By    Initials Name Provider Type    CP Ute Dominguez PTA Physical Therapy Assistant                              PT OP Goals     Row Name 01/24/22 1100          PT Short Term Goals    STG Date to Achieve --  further STGs deferred  -CP     STG 1 Note a >/= 50% subjective improvement.  -CP     STG 1 Progress Met  -CP     STG 2 R shoulder PROM WNLs.  -CP     STG 2 Progress Met  -CP     STG 3 R shoulder active flexion and abduction to be >/= 90 deg each.  -CP     STG 3 Progress Met  -CP            Long Term Goals    LTG Date to Achieve 02/09/22  -CP     LTG 1 Independent with HEP.  -CP     LTG 1 Progress Not Met; Ongoing  -CP     LTG 2 R shoulder AROM WFLs.  -CP     LTG 2 Progress Not Met; Ongoing  -CP     LTG 3 QuickDASH score to be </= 20.  -CP     LTG 3 Progress Not Met; Ongoing  -CP     LTG 4 Complete ADLs and IADLs with minimal discomfort.  -CP     LTG 4 Progress Not Met; Ongoing  -CP            Time Calculation    PT Goal Re-Cert Due Date 02/09/22  -CP           User Key  (r) = Recorded By, (t) = Taken By, (c) = Cosigned By    Initials Name Provider Type    CP Ute Dominguez PTA Physical Therapy Assistant                               Time Calculation:   Start Time: 1100  Stop Time: 1143  Time Calculation (min): 43 min  Total Timed Code Minutes- PT: 43 minute(s)  Timed Charges  56894 - PT Therapeutic Exercise Minutes: 43  Total Minutes  Timed Charges Total Minutes: 43   Total Minutes: 43  Therapy Charges for Today     Code Description Service Date Service Provider Modifiers Qty    25025966032 HC PT THER PROC EA 15 MIN 1/24/2022 Ute Dominguez PTA GP, CQ 3                    Ute Dominguez PTA  1/24/2022

## 2022-01-26 ENCOUNTER — HOSPITAL ENCOUNTER (OUTPATIENT)
Dept: PHYSICAL THERAPY | Facility: HOSPITAL | Age: 57
Setting detail: THERAPIES SERIES
Discharge: HOME | End: 2022-01-26

## 2022-01-26 DIAGNOSIS — M75.111 NONTRAUMATIC INCOMPLETE TEAR OF RIGHT ROTATOR CUFF: ICD-10-CM

## 2022-01-26 DIAGNOSIS — M75.101 ROTATOR CUFF SYNDROME OF RIGHT SHOULDER: Primary | ICD-10-CM

## 2022-01-26 PROCEDURE — 97110 THERAPEUTIC EXERCISES: CPT

## 2022-01-26 PROCEDURE — 97140 MANUAL THERAPY 1/> REGIONS: CPT

## 2022-01-26 NOTE — THERAPY TREATMENT NOTE
Outpatient Physical Therapy Ortho Treatment Note  [facility]     Patient Name: Alysia Deutsch  : 1965  MRN: [id number]  Today's Date: 2022      Visit Date: 2022     Subjective Improvement 85%  Visits 17/  Visits approved 20  RTMD 2-  Recert Date 2022    S/P R SAD/Cristal on 2021    Visit Dx:    ICD-10-CM ICD-9-CM   1. Rotator cuff syndrome of right shoulder  M75.101 726.10   2. Nontraumatic incomplete tear of right rotator cuff  M75.111 726.13       Patient Active Problem List   Diagnosis   • Idiopathic osteoporosis   • Solitary pulmonary nodule   • Adenocarcinoma of left lung (HCC)   • Panlobular emphysema (HCC)   • Mixed hyperlipidemia   • Benign essential HTN   • Generalized abdominal pain   • Other constipation   • Gastroesophageal reflux disease   • Chronic low back pain   • Rotator cuff syndrome, right   • Right hip pain   • Tear of right rotator cuff   • Lateral epicondylitis, right elbow   • Lumbar degenerative disc disease   • Nicotine dependence, cigarettes, with other nicotine-induced disorders   • S/P arthroscopy of right shoulder        Past Medical History:   Diagnosis Date   • Anxiety    • Back pain    • Cancer (HCC)     lung   • Hepatitis    • Lung mass    • PONV (postoperative nausea and vomiting)    • Shoulder pain, right         Past Surgical History:   Procedure Laterality Date   • BREAST AUGMENTATION     • COLONOSCOPY N/A 10/27/2020    Procedure: COLONOSCOPY;  Surgeon: Carlos Enrique Alcantara MD;  Location: [facility] ENDOSCOPY;  Service: Gastroenterology;  Laterality: N/A;   • ENDOSCOPY N/A 10/27/2020    Procedure: ESOPHAGOGASTRODUODENOSCOPY;  Surgeon: Carlos Enrique Alcantara MD;  Location: [facility] ENDOSCOPY;  Service: Gastroenterology;  Laterality: N/A;   • LAPAROSCOPIC TUBAL LIGATION     • LUNG LOBECTOMY Left 2020    LOWER   • SHOULDER ARTHROSCOPY Right 2021    Procedure: ARTHROSCOPY OF THE RIGHT SHOULDER WITH SUBACROMIAL DECOMPRESSIONCRISTAL  PROCEDURE;  Surgeon: Dario Wilson MD;  Location: [facility];  Service: Orthopedics;  Laterality: Right;   • SPINAL FUSION Right 06/07/2021    R SI joint fusion   • THORACOSCOPY Left 8/20/2020    Procedure: LEFT THORACOSCOPY VIDEO ASSISTED THORACOTOMY pulmonary resection thoracic mediastinal lymphadenectomy bronchoscopy;  Surgeon: Horace Christiansen MD;  Location: [facility] OR;  Service: Cardiothoracic;  Laterality: Left;  Bronch [phone]  VATS [phone]        PT Ortho     Row Name 01/26/22 1300       Subjective Comments    Subjective Comments Patient reports decrease pain today.  she really wants to start onstrengthening ex.  She is tired of staying at home and wants to return to work.  -CP       Precautions and Contraindications    Precautions R SAD/Juana 11/22/21  -CP    Contraindications L lobectomy due to lung cancer August 2020  -CP       Subjective Pain    Able to rate subjective pain? yes  -CP    Pre-Treatment Pain Level 1  -CP    Subjective Pain Comment denies ice  -CP          User Key  (r) = Recorded By, (t) = Taken By, (c) = Cosigned By    Initials Name Provider Type    Ute Iverson, AMADO Physical Therapy Assistant                             PT Assessment/Plan     Row Name 01/26/22 1356          PT Assessment    Assessment Comments Patient did have a few episodes of popping in the right shoulder with PROM AB.  She was TTP to right UT and right pec area.  She does appear to be progressing well toward all LTG's  -CP            PT Plan    PT Frequency 2x/week  -CP     Predicted Duration of Therapy Intervention (PT) 8 weeks  -CP     PT Plan Comments Cont the POC.  take AROM measurements in standing  -CP           User Key  (r) = Recorded By, (t) = Taken By, (c) = Cosigned By    Initials Name Provider Type    Ute Iverson PTA Physical Therapy Assistant                   OP Exercises     Row Name 01/26/22 5362 01/26/22 1300          Subjective Comments    Subjective Comments --  Patient reports decrease pain today.  she really wants to start onstrengthening ex.  She is tired of staying at home and wants to return to work.  -CP            Subjective Pain    Able to rate subjective pain? -- yes  -CP     Pre-Treatment Pain Level -- 1  -CP     Post-Treatment Pain Level -- 2  -CP     Subjective Pain Comment -- denies ice  -CP            Total Minutes    08705 - PT Therapeutic Exercise Minutes 35  -CP --     27624 - PT Manual Therapy Minutes 10  -CP --            Exercise 1    Exercise Name 1 -- Pro I level 3  -CP     Time 1 -- 10  -CP     Additional Comments -- FW/BW  -CP            Exercise 2    Exercise Name 2 -- cybex rows  -CP     Cueing 2 -- Verbal  -CP     Sets 2 -- 3  -CP     Reps 2 -- 10  -CP     Additional Comments -- 30 lb  -CP            Exercise 3    Exercise Name 3 -- cybex lat pull downs  -CP     Cueing 3 -- Verbal  -CP     Sets 3 -- 3  -CP     Reps 3 -- 10  -CP     Time 3 -- 30 lb  -CP            Exercise 4    Exercise Name 4 -- doorway PNF  -CP     Cueing 4 -- Verbal; Demo  -CP     Sets 4 -- 1  -CP     Reps 4 -- 10 each way  -CP     Time 4 -- 1 lb AW at wrist  -CP            Exercise 5    Exercise Name 5 -- seated no money with tband  -CP     Cueing 5 -- Verbal; Demo  -CP     Sets 5 -- 2  -CP     Reps 5 -- 10  -CP     Time 5 -- yellow tband  -CP            Exercise 6    Exercise Name 6 -- PROM  -CP     Time 6 -- 6  -CP            Exercise 7    Exercise Name 7 -- see manual  -CP           User Key  (r) = Recorded By, (t) = Taken By, (c) = Cosigned By    Initials Name Provider Type    CP Ute Dominguez, PTA Physical Therapy Assistant                         Manual Rx (last 36 hours)     Manual Treatments     Row Name 01/26/22 1358 01/26/22 1300          Total Minutes    79261 - PT Manual Therapy Minutes 10  -CP --            Manual Rx 1    Manual Rx 1 Location -- R UT  -CP     Manual Rx 1 Type -- TrP  -CP     Manual Rx 1 Duration -- 5  -CP            Manual Rx 2    Manual Rx 2  Location -- R Pec  -CP     Manual Rx 2 Type -- TrP  -CP     Manual Rx 2 Duration -- 5  -CP           User Key  (r) = Recorded By, (t) = Taken By, (c) = Cosigned By    Initials Name Provider Type    Ute Iverson, AMADO Physical Therapy Assistant                 PT OP Goals     Row Name 01/26/22 1300          PT Short Term Goals    STG Date to Achieve --  further STGs deferred  -CP     STG 1 Note a >/= 50% subjective improvement.  -CP     STG 1 Progress Met  -CP     STG 2 R shoulder PROM WNLs.  -CP     STG 2 Progress Met  -CP     STG 3 R shoulder active flexion and abduction to be >/= 90 deg each.  -CP     STG 3 Progress Met  -CP            Long Term Goals    LTG Date to Achieve 02/09/22  -CP     LTG 1 Independent with HEP.  -CP     LTG 1 Progress Not Met; Ongoing  -CP     LTG 2 R shoulder AROM WFLs.  -CP     LTG 2 Progress Not Met; Ongoing  -CP     LTG 3 QuickDASH score to be </= 20.  -CP     LTG 3 Progress Not Met; Ongoing  -CP     LTG 4 Complete ADLs and IADLs with minimal discomfort.  -CP     LTG 4 Progress Not Met; Ongoing  -CP            Time Calculation    PT Goal Re-Cert Due Date 02/09/22  -CP           User Key  (r) = Recorded By, (t) = Taken By, (c) = Cosigned By    Initials Name Provider Type    Ute Iverson PTA Physical Therapy Assistant                Therapy Education  Education Details: no money with tband  Given: HEP  Program: New  How Provided: Verbal, Demonstration, Written  Provided to: Patient  Level of Understanding: Teach back education performed, Verbalized, Demonstrated              Time Calculation:   Start Time: 1105  Stop Time: 1150  Time Calculation (min): 45 min  Total Timed Code Minutes- PT: 45 minute(s)  Timed Charges  60581 - PT Therapeutic Exercise Minutes: 35  67217 - PT Manual Therapy Minutes: 10  Total Minutes  Timed Charges Total Minutes: 45   Total Minutes: 45  Therapy Charges for Today     Code Description Service Date Service Provider Modifiers Qty    72180943215  HC PT MANUAL THERAPY EA 15 MIN 1/26/2022 Ute Dominguez, AMADO GP, CQ 1    52844325717 HC PT THER PROC EA 15 MIN 1/26/2022 Ute Dominguez, AMADO GP, CQ 2                    Ute Dominguez PTA  1/26/2022

## 2022-01-31 ENCOUNTER — HOSPITAL ENCOUNTER (OUTPATIENT)
Dept: PHYSICAL THERAPY | Facility: HOSPITAL | Age: 57
Setting detail: THERAPIES SERIES
Discharge: HOME | End: 2022-01-31

## 2022-01-31 DIAGNOSIS — M75.101 ROTATOR CUFF SYNDROME OF RIGHT SHOULDER: Primary | ICD-10-CM

## 2022-01-31 DIAGNOSIS — M75.111 NONTRAUMATIC INCOMPLETE TEAR OF RIGHT ROTATOR CUFF: ICD-10-CM

## 2022-01-31 PROCEDURE — 97110 THERAPEUTIC EXERCISES: CPT | Performed by: PHYSICAL THERAPIST

## 2022-02-02 ENCOUNTER — APPOINTMENT (OUTPATIENT)
Dept: PHYSICAL THERAPY | Facility: HOSPITAL | Age: 57
End: 2022-02-02

## 2022-02-07 ENCOUNTER — HOSPITAL ENCOUNTER (OUTPATIENT)
Dept: CT IMAGING | Facility: HOSPITAL | Age: 57
Discharge: HOME | End: 2022-02-07
Admitting: THORACIC SURGERY (CARDIOTHORACIC VASCULAR SURGERY)

## 2022-02-07 ENCOUNTER — HOSPITAL ENCOUNTER (OUTPATIENT)
Dept: PHYSICAL THERAPY | Facility: HOSPITAL | Age: 57
Setting detail: THERAPIES SERIES
Discharge: HOME | End: 2022-02-07

## 2022-02-07 DIAGNOSIS — M75.111 NONTRAUMATIC INCOMPLETE TEAR OF RIGHT ROTATOR CUFF: ICD-10-CM

## 2022-02-07 DIAGNOSIS — C34.92 ADENOCARCINOMA OF LEFT LUNG: ICD-10-CM

## 2022-02-07 DIAGNOSIS — M75.101 ROTATOR CUFF SYNDROME OF RIGHT SHOULDER: Primary | ICD-10-CM

## 2022-02-07 PROCEDURE — 97110 THERAPEUTIC EXERCISES: CPT

## 2022-02-07 PROCEDURE — 71250 CT THORAX DX C-: CPT

## 2022-02-07 NOTE — THERAPY DISCHARGE NOTE
Outpatient Physical Therapy Ortho Treatment Note/Discharge Summary  [facility]     Patient Name: Alysia Deutsch  : 1965  MRN: [id number]  Today's Date: 2022      Visit Date: 2022     Subjective Improvement 80-85%  Visits   Visits approved 30 per year, 10 used in   RTMD 2-  MD note faxed today  Recert Date 2022    S/P R subacromial decompression and distal clavicle resection on 2021       Visit Dx:    ICD-10-CM ICD-9-CM   1. Rotator cuff syndrome of right shoulder  M75.101 726.10   2. Nontraumatic incomplete tear of right rotator cuff  M75.111 726.13       Patient Active Problem List   Diagnosis   • Idiopathic osteoporosis   • Solitary pulmonary nodule   • Adenocarcinoma of left lung (HCC)   • Panlobular emphysema (HCC)   • Mixed hyperlipidemia   • Benign essential HTN   • Generalized abdominal pain   • Other constipation   • Gastroesophageal reflux disease   • Chronic low back pain   • Rotator cuff syndrome, right   • Right hip pain   • Tear of right rotator cuff   • Lateral epicondylitis, right elbow   • Lumbar degenerative disc disease   • Nicotine dependence, cigarettes, with other nicotine-induced disorders   • S/P arthroscopy of right shoulder        Past Medical History:   Diagnosis Date   • Anxiety    • Back pain    • Cancer (HCC)     lung   • Hepatitis    • Lung mass    • PONV (postoperative nausea and vomiting)    • Shoulder pain, right         Past Surgical History:   Procedure Laterality Date   • BREAST AUGMENTATION     • COLONOSCOPY N/A 10/27/2020    Procedure: COLONOSCOPY;  Surgeon: Carlos Enrique Alcantara MD;  Location: [facility] ENDOSCOPY;  Service: Gastroenterology;  Laterality: N/A;   • ENDOSCOPY N/A 10/27/2020    Procedure: ESOPHAGOGASTRODUODENOSCOPY;  Surgeon: Carlos Enrique Alcantara MD;  Location: [facility] ENDOSCOPY;  Service: Gastroenterology;  Laterality: N/A;   • LAPAROSCOPIC TUBAL LIGATION     • LUNG LOBECTOMY Left 2020    LOWER   • SHOULDER  ARTHROSCOPY Right 11/22/2021    Procedure: ARTHROSCOPY OF THE RIGHT SHOULDER WITH SUBACROMIAL DECOMPRESSION, CRISTAL PROCEDURE;  Surgeon: Dario Wilson MD;  Location: [facility];  Service: Orthopedics;  Laterality: Right;   • SPINAL FUSION Right 06/07/2021    R SI joint fusion   • THORACOSCOPY Left 8/20/2020    Procedure: LEFT THORACOSCOPY VIDEO ASSISTED THORACOTOMY pulmonary resection thoracic mediastinal lymphadenectomy bronchoscopy;  Surgeon: Horace Christiansen MD;  Location: [facility];  Service: Cardiothoracic;  Laterality: Left;  [place] [phone]  VATS [phone]        PT Ortho     Row Name 02/07/22 1300       Right Upper Ext    Rt Shoulder Abduction AROM 140  -CP    Rt Shoulder Flexion AROM 160  -CP    Rt Shoulder External Rotation AROM 85  -CP    Rt Shoulder Internal Rotation AROM T11-12  -CP       MMT Right Upper Ext    Rt Shoulder Flexion MMT, Gross Movement (4/5) good  -CP    Rt Shoulder Extension MMT, Gross Movement (5/5) normal  -CP    Rt Shoulder ABduction MMT, Gross Movement (4-/5) good minus  -CP    Rt Shoulder Internal Rotation MMT, Gross Movement (4/5) good  -CP    Rt Shoulder External Rotation MMT, Gross Movement (5/5) normal  -CP    Row Name 02/07/22 1200       Precautions and Contraindications    Precautions R SAD/Cristal 11/22/21  -CP    Contraindications L lobectomy due to lung cancer August 2020  -CP       Subjective Pain    Able to rate subjective pain? yes  -CP    Pre-Treatment Pain Level 1  -CP       MMT Right Upper Ext    Rt Shoulder Flexion MMT, Gross Movement --  -CP    Rt Shoulder Extension MMT, Gross Movement --  -CP    Rt Shoulder ABduction MMT, Gross Movement --  -CP    Rt Shoulder Internal Rotation MMT, Gross Movement --  -CP    Rt Shoulder External Rotation MMT, Gross Movement --  -CP          User Key  (r) = Recorded By, (t) = Taken By, (c) = Cosigned By    Initials Name Provider Type    CP Ute Dominguez, PTA Physical Therapy Assistant                              PT Assessment/Plan     Row Name 02/07/22 1341          PT Assessment    Assessment Comments Patient wishes to be D/C from therapy.  She wants to return to work and get back to doing things.  HEP was reviewed by patient.  All goals met except her quickdash score.  She scored 20.45 and goal was to be less than or equal to 20.  She is TTP to right ant shoulder.  -CP            PT Plan    PT Plan Comments D/C to home with HEP  -CP           User Key  (r) = Recorded By, (t) = Taken By, (c) = Cosigned By    Initials Name Provider Type    Ute Iverson, PTA Physical Therapy Assistant                     OP Exercises     Row Name 02/07/22 1343 02/07/22 1200          Subjective Comments    Subjective Comments -- Today will be her last last day,  Its not 100% but she wants to go back to work and get on with her life.  -CP            Subjective Pain    Able to rate subjective pain? -- yes  -CP     Pre-Treatment Pain Level -- 1  -CP     Post-Treatment Pain Level -- 2  -CP            Total Minutes    65140 - PT Therapeutic Exercise Minutes 40  -CP --            Exercise 1    Exercise Name 1 -- pro I ilevel 3.5  -CP     Time 1 -- 5/5  -CP     Additional Comments -- FW/BW  -CP            Exercise 2    Exercise Name 2 -- IR stretch with towel  -CP     Cueing 2 -- Verbal; Demo  -CP     Sets 2 -- 3  -CP     Time 2 -- 30  -CP            Exercise 3    Exercise Name 3 -- doorway stretch  -CP     Cueing 3 -- Verbal; Demo  -CP     Sets 3 -- 3  -CP     Time 3 -- 30  -CP            Exercise 4    Exercise Name 4 -- wall push up  -CP     Cueing 4 -- Verbal; Demo  -CP     Sets 4 -- 2  -CP     Reps 4 -- 10  -CP            Exercise 5    Exercise Name 5 -- serratus wall slides with tband  -CP     Cueing 5 -- Verbal; Demo  -CP     Sets 5 -- 2  -CP     Reps 5 -- 10  -CP     Time 5 -- red tband  -CP            Exercise 6    Exercise Name 6 -- wall edie  -CP     Cueing 6 -- Verbal; Demo  -CP     Sets 6 -- 2  -CP     Reps 6 -- 10  -CP             Exercise 7    Exercise Name 7 -- AB wall taps with DB  -CP     Cueing 7 -- Verbal; Demo  -CP     Sets 7 -- 2  -CP     Reps 7 -- 10  -CP     Time 7 -- 1 lb DB  -CP            Exercise 8    Exercise Name 8 -- wall taps high  -CP     Cueing 8 -- Verbal; Demo  -CP     Sets 8 -- 2  -CP     Reps 8 -- 10  -CP     Time 8 -- 1 lb DB  -CP           User Key  (r) = Recorded By, (t) = Taken By, (c) = Cosigned By    Initials Name Provider Type    Ute Iverson, AMADO Physical Therapy Assistant                                PT OP Goals     Row Name 02/07/22 1300          PT Short Term Goals    STG Date to Achieve --  further STGs deferred  -CP            Long Term Goals    LTG Date to Achieve 02/09/22  -CP     LTG 1 Independent with HEP.  -CP     LTG 1 Progress Met  -CP     LTG 2 R shoulder AROM WFLs.  -CP     LTG 2 Progress Met  -CP     LTG 3 QuickDASH score to be </= 20.  -CP     LTG 3 Progress Not Met; Ongoing  -CP     LTG 3 Progress Comments score today was 20.45  -CP     LTG 4 Complete ADLs and IADLs with minimal discomfort.  -CP     LTG 4 Progress Met  -CP            Time Calculation    PT Goal Re-Cert Due Date 02/09/22  -CP           User Key  (r) = Recorded By, (t) = Taken By, (c) = Cosigned By    Initials Name Provider Type    Ute Iverson PTA Physical Therapy Assistant                Therapy Education  Education Details: IR stretch with towel. wall edie, serratus wall slides with tband, AB wall taps, wall tap high doorway stretch.  Given: HEP  Program: New  How Provided: Verbal, Demonstration, Written  Provided to: Patient  Level of Understanding: Teach back education performed, Verbalized, Demonstrated    Outcome Measure Options: Quick DASH  Quick DASH  Open a tight or new jar.: Mild Difficulty  Do heavy household chores (e.g., wash walls, wash floors): Mild Difficulty  Carry a shopping bag or briefcase: Mild Difficulty  Wash your back: Mild Difficulty  Use a knife to cut food: No  Difficulty  Recreational activities in which you take some force or impact through your arm, should or hand (e.g. golf, hammering, tennis, etc.): Mild Difficulty  During the past week, to what extent has your arm, shoulder, or hand problem interfered with your normal social activites with family, friends, neighbors or groups?: Slightly  During the past week, were you limited in your work or other regular daily activities as a result of your arm, shoulder or hand problem?: Slightly Limited  Arm, Shoulder, or hand pain: Mild  Tingling (pins and needles) in your arm, shoulder, or hand: None  During the past week, how much difficulty have you had sleeping because of the pain in your arm, shoulder or hand?: Mild Difficulty  Number of Questions Answered: 11  Quick DASH Score: 20.45         Time Calculation:   Start Time: 1300  Stop Time: 1340  Time Calculation (min): 40 min  Timed Charges  51343 - PT Therapeutic Exercise Minutes: 40  Total Minutes  Timed Charges Total Minutes: 40   Total Minutes: 40  Therapy Charges for Today     Code Description Service Date Service Provider Modifiers Qty    74138193991 HC PT THER PROC EA 15 MIN 2/7/2022 Ute Dominguez PTA GP, CQ 3          PT G-Codes  Outcome Measure Options: Quick DASH  Quick DASH Score: 20.45     OP PT Discharge Summary  Date of Discharge: 02/07/22  Reason for Discharge: Patient/Caregiver request  Outcomes Achieved: Patient able to partially acheive established goals  Discharge Destination: Home with home program      Ute Dominguez PTA  2/7/2022

## 2022-02-09 ENCOUNTER — APPOINTMENT (OUTPATIENT)
Dept: PHYSICAL THERAPY | Facility: HOSPITAL | Age: 57
End: 2022-02-09

## 2022-02-14 ENCOUNTER — APPOINTMENT (OUTPATIENT)
Dept: PHYSICAL THERAPY | Facility: HOSPITAL | Age: 57
End: 2022-02-14

## 2022-02-15 ENCOUNTER — OFFICE VISIT (OUTPATIENT)
Dept: ORTHOPEDIC SURGERY | Facility: CLINIC | Age: 57
End: 2022-02-15

## 2022-02-15 VITALS — WEIGHT: 129 LBS | HEIGHT: 63 IN | BODY MASS INDEX: 22.86 KG/M2

## 2022-02-15 DIAGNOSIS — M75.101 ROTATOR CUFF SYNDROME, RIGHT: ICD-10-CM

## 2022-02-15 DIAGNOSIS — Z98.890 S/P ARTHROSCOPY OF RIGHT SHOULDER: Primary | ICD-10-CM

## 2022-02-15 PROCEDURE — 99024 POSTOP FOLLOW-UP VISIT: CPT | Performed by: ORTHOPAEDIC SURGERY

## 2022-02-15 NOTE — PROGRESS NOTES
"Alysia Deutsch is a 56 y.o. female returns for     Chief Complaint   Patient presents with   • Right Shoulder - Follow-up       HISTORY OF PRESENT ILLNESS:  Patient in for follow-up on right shoulder.      Still with discomfort at night and with certain activity.  Wants to return to work.  Improvement from before surgery  Slowly improving.    CONCURRENT MEDICAL HISTORY:    The following portions of the patient's history were reviewed and updated as appropriate: allergies, current medications, past family history, past medical history, past social history, past surgical history and problem list.     ROS  No fevers or chills.  No chest pain or shortness of air.  No GI or  disturbances.    PHYSICAL EXAMINATION:       Ht 160 cm (63\")   Wt 58.5 kg (129 lb)   LMP  (LMP Unknown)   BMI 22.85 kg/m²     Physical Exam  Constitutional:       General: She is not in acute distress.     Appearance: Normal appearance.   Pulmonary:      Effort: Pulmonary effort is normal. No respiratory distress.   Neurological:      Mental Status: She is alert and oriented to person, place, and time.           Right Shoulder Exam     Tenderness   The patient is experiencing no tenderness.    Range of Motion   Active abduction: 140   Forward flexion: 170     Muscle Strength   Abduction: 4/5   Supraspinatus: 4/5     Other   Erythema: absent  Sensation: normal  Pulse: present                        ASSESSMENT:    Diagnoses and all orders for this visit:    S/P arthroscopy of right shoulder    Rotator cuff syndrome, right          PLAN    Slowly improving  Activity as tolerated  Continue with HEP, strength, conditioning, and full motion as tolerated.    Return to work with no activity over shoulder level.      F/u in 8 weeks.    Return in about 8 weeks (around 4/12/2022) for recheck.    Dario Wilson MD    "

## 2022-02-16 ENCOUNTER — APPOINTMENT (OUTPATIENT)
Dept: PHYSICAL THERAPY | Facility: HOSPITAL | Age: 57
End: 2022-02-16

## 2022-02-21 ENCOUNTER — OFFICE VISIT (OUTPATIENT)
Dept: CARDIAC SURGERY | Facility: CLINIC | Age: 57
End: 2022-02-21

## 2022-02-21 VITALS
HEIGHT: 63 IN | DIASTOLIC BLOOD PRESSURE: 60 MMHG | TEMPERATURE: 98.1 F | WEIGHT: 129 LBS | SYSTOLIC BLOOD PRESSURE: 100 MMHG | HEART RATE: 72 BPM | BODY MASS INDEX: 22.86 KG/M2 | OXYGEN SATURATION: 99 %

## 2022-02-21 DIAGNOSIS — F17.218 NICOTINE DEPENDENCE, CIGARETTES, WITH OTHER NICOTINE-INDUCED DISORDERS: ICD-10-CM

## 2022-02-21 DIAGNOSIS — J43.1 PANLOBULAR EMPHYSEMA: ICD-10-CM

## 2022-02-21 DIAGNOSIS — C34.92 ADENOCARCINOMA OF LEFT LUNG: Primary | ICD-10-CM

## 2022-02-21 DIAGNOSIS — I10 BENIGN ESSENTIAL HTN: ICD-10-CM

## 2022-02-21 DIAGNOSIS — E78.2 MIXED HYPERLIPIDEMIA: ICD-10-CM

## 2022-02-21 PROCEDURE — 99214 OFFICE O/P EST MOD 30 MIN: CPT | Performed by: THORACIC SURGERY (CARDIOTHORACIC VASCULAR SURGERY)

## 2022-04-12 ENCOUNTER — OFFICE VISIT (OUTPATIENT)
Dept: ORTHOPEDIC SURGERY | Facility: CLINIC | Age: 57
End: 2022-04-12

## 2022-04-12 VITALS — HEIGHT: 63 IN | BODY MASS INDEX: 22.86 KG/M2 | WEIGHT: 129 LBS

## 2022-04-12 DIAGNOSIS — M75.101 ROTATOR CUFF SYNDROME, RIGHT: ICD-10-CM

## 2022-04-12 DIAGNOSIS — I10 BENIGN ESSENTIAL HTN: ICD-10-CM

## 2022-04-12 DIAGNOSIS — S46.011D TRAUMATIC INCOMPLETE TEAR OF RIGHT ROTATOR CUFF, SUBSEQUENT ENCOUNTER: ICD-10-CM

## 2022-04-12 DIAGNOSIS — Z98.890 S/P ARTHROSCOPY OF RIGHT SHOULDER: Primary | ICD-10-CM

## 2022-04-12 PROBLEM — S46.011A TRAUMATIC INCOMPLETE TEAR OF RIGHT ROTATOR CUFF: Status: ACTIVE | Noted: 2022-04-12

## 2022-04-12 PROCEDURE — 99214 OFFICE O/P EST MOD 30 MIN: CPT | Performed by: ORTHOPAEDIC SURGERY

## 2022-04-12 RX ORDER — DICLOFENAC SODIUM 75 MG/1
75 TABLET, DELAYED RELEASE ORAL 2 TIMES DAILY
Qty: 60 TABLET | Refills: 3 | Status: SHIPPED | OUTPATIENT
Start: 2022-04-12 | End: 2022-06-17 | Stop reason: HOSPADM

## 2022-04-12 NOTE — PROGRESS NOTES
"Alysia Deutsch is a 56 y.o. female returns for     Chief Complaint   Patient presents with   • Right Shoulder - Follow-up       HISTORY OF PRESENT ILLNESS:  8 week follow up right shoulder.  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  She has been very tender after work since returning.  She is complaining of pain at night.  She states that she is able to do her job but that she feels obligated to maintain the same page that she had prior to her injury.  She has not been taking anti-inflammatory medication.  She reports no new injury.       CONCURRENT MEDICAL HISTORY:    The following portions of the patient's history were reviewed and updated as appropriate: allergies, current medications, past family history, past medical history, past social history, past surgical history and problem list.     ROS  No fevers or chills.  No chest pain or shortness of air.  No GI or  disturbances.    PHYSICAL EXAMINATION:       Ht 160 cm (63\")   Wt 58.5 kg (129 lb)   LMP  (LMP Unknown)   BMI 22.85 kg/m²     Physical Exam  Constitutional:       General: She is not in acute distress.     Appearance: Normal appearance.   Pulmonary:      Effort: Pulmonary effort is normal. No respiratory distress.   Neurological:      Mental Status: She is alert and oriented to person, place, and time.         GAIT:     [x]  Normal  []  Antalgic    Assistive device: [x]  None  []  Walker     []  Crutches  []  Cane     []  Wheelchair  []  Stretcher    Right Shoulder Exam     Tenderness   The patient is experiencing tenderness in the acromioclavicular joint.    Range of Motion   Active abduction: 120   Forward flexion: 120   Internal rotation 0 degrees: L5 (T5 on the left)     Muscle Strength   Abduction: 4/5   Supraspinatus: 4/5                     ASSESSMENT:    Diagnoses and all orders for this visit:    S/P arthroscopy of right shoulder    Rotator cuff syndrome, right    Benign essential " HTN    Traumatic incomplete tear of right rotator cuff, subsequent encounter    Other orders  -     diclofenac (VOLTAREN) 75 MG EC tablet; Take 1 tablet by mouth 2 (Two) Times a Day.          PLAN    Long discussion was held the patient regarding continued progression of her activity.  We discussed protected activity and slowly working on stretching and strengthening.  She seems to be having some symptoms of adhesive capsulitis.  Strongly encouraged her to continue with passive range of motion stretches.  We also discussed limiting activity at or above shoulder level.  We even discussed the possibility of not being able to return to the same job that she was doing previously at the same pace that she was doing it.    She is going to work with a therapist at  and slowly increase her activity.  She also was given begin taking diclofenac which a prescription was given.    Follow-up in 6 weeks.    Return in about 6 weeks (around 5/24/2022) for recheck.    Dario Wilson MD

## 2022-04-20 PROBLEM — R91.1 SOLITARY PULMONARY NODULE: Status: RESOLVED | Noted: 2020-08-10 | Resolved: 2022-04-20

## 2022-04-20 NOTE — PROGRESS NOTES
2/21/2022    Alysia Deutsch  1965    Chief Complaint:    Chief Complaint   Patient presents with   • Lung Cancer       HPI:      PCP:  Rosario Mckeon MD  GI:  Dr Alcantara  Ortho: Dr. Gaines     56y.o. female with HTN(stable, increased risk stroke, rupture), Hyperlipidemia(stable, increase risk cardiovascular events), COPD(chronic progression, increase risk pulmonary complications) and Smoker(uncontrolled, increased risk cardiovascular events) insomnia,  pulmonary nodule (new, suspicious malignancy).  smokes 0.25 PPD.  asymptomatic ANGELINA pulmonary bullae.  No TIA stroke amaurosis.  No MI claudication.   No other associated signs, symptoms or modifying factors.     6/2020 CT Brain:  No acute findings.  6/2020 CT Chest: 20mm nodule LEFT lower lobe.  No significant adenopathy.  Liver, adrenals ok.  8/2020 PET CT: LLL nodule 22mm (SUV 5.4)  8/2020 PFT:  FVC 2.9 (136%), FEV1 1.8 (103%), DLCO 54%  8/2020 LEFT VAT lower lobectomy  Path: Adenocarcinoma, ([id number])  2/2021 CT Chest: no evidence of recurrence.  Liver adrenals ok.:    8/2021 CT Chest:  No evidence recurrence.  Liver adrenals ok.  40mm RIGHT upper lobe bulla  2/2022 CT Chest:  No evidence recurrence.  Liver adrenals ok.  40mm RIGHT upper lobe bulla    The following portions of the patient's history were reviewed and updated as appropriate: allergies, current medications, past family history, past medical history, past social history, past surgical history and problem list.  Recent images independently reviewed.  Available laboratory values reviewed.    PMH:  Past Medical History:   Diagnosis Date   • Anxiety    • Back pain    • Cancer (HCC)     lung   • Hepatitis    • Lung mass    • PONV (postoperative nausea and vomiting)    • Shoulder pain, right      Past Surgical History:   Procedure Laterality Date   • BREAST AUGMENTATION     • COLONOSCOPY N/A 10/27/2020    Procedure: COLONOSCOPY;  Surgeon: Carlos Enrique Alcantara MD;  Location: [facility] ENDOSCOPY;   Service: Gastroenterology;  Laterality: N/A;   • ENDOSCOPY N/A 10/27/2020    Procedure: ESOPHAGOGASTRODUODENOSCOPY;  Surgeon: Carlos Enrique Alcantara MD;  Location: [facility] ENDOSCOPY;  Service: Gastroenterology;  Laterality: N/A;   • LAPAROSCOPIC TUBAL LIGATION     • LUNG LOBECTOMY Left 08/20/2020    LOWER   • SHOULDER ARTHROSCOPY Right 11/22/2021    Procedure: ARTHROSCOPY OF THE RIGHT SHOULDER WITH SUBACROMIAL DECOMPRESSION, CRISTAL PROCEDURE;  Surgeon: Dario Wilson MD;  Location: [facility] OR;  Service: Orthopedics;  Laterality: Right;   • SPINAL FUSION Right 06/07/2021    R SI joint fusion   • THORACOSCOPY Left 8/20/2020    Procedure: LEFT THORACOSCOPY VIDEO ASSISTED THORACOTOMY pulmonary resection thoracic mediastinal lymphadenectomy bronchoscopy;  Surgeon: Horace Christiansen MD;  Location: [facility] OR;  Service: Cardiothoracic;  Laterality: Left;  Bronch [phone]  VATS [phone]     Family History   Problem Relation Age of Onset   • Diabetes Other    • Cancer Other    • No Known Problems Mother    • Heart disease Father    • Pancreatic cancer Father    • Colon cancer Maternal Grandfather      Social History     Tobacco Use   • Smoking status: Current Every Day Smoker     Packs/day: 0.50     Years: 30.00     Pack years: 15.00     Types: Cigarettes   • Smokeless tobacco: Never Used   Vaping Use   • Vaping Use: Never used   Substance Use Topics   • Alcohol use: Not Currently   • Drug use: Not Currently     Comment: 30 YRS AGO       ALLERGIES:  Allergies   Allergen Reactions   • Codeine Hives and Itching   • Morphine Nausea And Vomiting     Severe vomiting   • Adhesive Tape Rash         MEDICATIONS:    Current Outpatient Medications:   •  acetaminophen (TYLENOL) 500 MG tablet, Take 1,000 mg by mouth Every 6 (Six) Hours As Needed for Mild Pain ., Disp: , Rfl:   •  albuterol sulfate  (90 Base) MCG/ACT inhaler, Two puffs per day every 4-6 hours as needed for wheeze or shortness of breath., Disp: 18 g,  "Rfl: 0  •  azithromycin (Zithromax Z-Pierce) 250 MG tablet, Take 2 tablets the first day, then 1 tablet daily for 4 days., Disp: 6 tablet, Rfl: 0  •  FLUoxetine (PROzac) 10 MG tablet, Take 10 mg by mouth Daily., Disp: , Rfl:   •  fluticasone (FLONASE) 50 MCG/ACT nasal spray, 1 spray into the nostril(s) as directed by provider Daily., Disp: , Rfl:   •  Loratadine 10 MG capsule, Take 10 mg by mouth Daily., Disp: , Rfl:   •  predniSONE (DELTASONE) 20 MG tablet, Day 1-5 take 1 tab po three times a day every 8 hours, day 6-10 take 1 tab po two times a day, day 11-15 take one tab by mouth daily., Disp: 30 tablet, Rfl: 0  •  promethazine-dextromethorphan (PROMETHAZINE-DM) 6.25-15 MG/5ML syrup, Take 5 mL by mouth 4 (Four) Times a Day As Needed for Cough., Disp: 118 mL, Rfl: 0  •  temazepam (RESTORIL) 15 MG capsule, Take 15-30 mg by mouth Every Night., Disp: , Rfl:   •  diclofenac (VOLTAREN) 75 MG EC tablet, Take 1 tablet by mouth 2 (Two) Times a Day., Disp: 60 tablet, Rfl: 3    Review of Systems   Review of Systems   Constitutional: Positive for malaise/fatigue. Negative for weight loss.   Cardiovascular: Positive for dyspnea on exertion. Negative for chest pain and claudication.   Respiratory: Negative for cough and shortness of breath.    Skin: Negative for color change and poor wound healing.   Musculoskeletal: Positive for arthritis, joint pain, muscle weakness and neck pain.   Neurological: Negative for dizziness, numbness and weakness.       Physical Exam   Vitals:    02/21/22 1129   BP: 100/60   BP Location: Left arm   Patient Position: Sitting   Cuff Size: Adult   Pulse: 72   Temp: 98.1 °F (36.7 °C)   TempSrc: Temporal   SpO2: 99%   Weight: 58.5 kg (129 lb)   Height: 160 cm (63\")     Body surface area is 1.6 meters squared.  Body mass index is 22.85 kg/m².  Physical Exam  Constitutional:       General: She is not in acute distress.     Appearance: She is not ill-appearing.   HENT:      Right Ear: Hearing normal.      " Left Ear: Hearing normal.      Nose: No nasal deformity.      Mouth/Throat:      Dentition: Normal dentition. Does not have dentures.   Cardiovascular:      Rate and Rhythm: Normal rate and regular rhythm.      Pulses:           Carotid pulses are 2+ on the right side and 2+ on the left side.       Radial pulses are 2+ on the right side and 2+ on the left side.        Dorsalis pedis pulses are 2+ on the right side and 2+ on the left side.        Posterior tibial pulses are 2+ on the right side and 2+ on the left side.      Heart sounds: No murmur heard.  Pulmonary:      Effort: Pulmonary effort is normal.      Breath sounds: Normal breath sounds.   Abdominal:      General: There is no distension.      Palpations: Abdomen is soft. There is no mass.      Tenderness: There is no abdominal tenderness.   Musculoskeletal:         General: No deformity.      Comments: Gait normal.    Skin:     General: Skin is warm and dry.      Coloration: Skin is not pale.      Findings: No erythema.      Comments: No venous staining   Neurological:      Mental Status: She is alert and oriented to person, place, and time.   Psychiatric:         Speech: Speech normal.         Behavior: Behavior is cooperative.         Thought Content: Thought content normal.         Judgment: Judgment normal.         BUN   Date Value Ref Range Status   08/21/2020 6 6 - 20 mg/dL Final     Creatinine   Date Value Ref Range Status   08/21/2020 0.67 0.57 - 1.00 mg/dL Final     eGFR Non  Amer   Date Value Ref Range Status   08/21/2020 91 >60 mL/min/1.73 Final       ASSESSMENT:  Diagnoses and all orders for this visit:    1. Adenocarcinoma of left lung (HCC) (Primary)  -     CT Chest Without Contrast Diagnostic; Future    2. Panlobular emphysema (HCC)    3. Mixed hyperlipidemia    4. Benign essential HTN    5. Nicotine dependence, cigarettes, with other nicotine-induced disorders    PLAN:  Detailed discussion with Alysia Deutsch regarding situation  and options.  Stable.  Multiple risk factors with severe comorbidities.  No intervention indicated at this time.  Will follow with interval imaging.  Risks, benefits discussed.  Understands and wishes to proceed with plan.    Return in 6 months with CT Chest    Return after above studies complete  Smoking cessation advised and assistance options offered including behavioral counseling ([facility]), Nicotine replacement therapy (patches or gum), pharmacologic therapy (Chantix, Wellbutrin). patient understands that continued use of tobacco products increases risk of heart disease, stroke, cancer; counseling for 3-5min.    Recommended regular physical activity, progressive walking program.  Continue current medications as directed.  Advance Care Planning   ACP discussion was declined by the patient. Patient has an advance directive in EMR which is still valid.      Thank you for the opportunity to participate in this patient's care.    Copy to primary care provider.

## 2022-05-24 ENCOUNTER — OFFICE VISIT (OUTPATIENT)
Dept: ORTHOPEDIC SURGERY | Facility: CLINIC | Age: 57
End: 2022-05-24

## 2022-05-24 VITALS — HEIGHT: 63 IN | WEIGHT: 129 LBS | BODY MASS INDEX: 22.86 KG/M2

## 2022-05-24 DIAGNOSIS — K21.00 GASTROESOPHAGEAL REFLUX DISEASE WITH ESOPHAGITIS WITHOUT HEMORRHAGE: ICD-10-CM

## 2022-05-24 DIAGNOSIS — Z98.890 S/P ARTHROSCOPY OF RIGHT SHOULDER: Primary | ICD-10-CM

## 2022-05-24 DIAGNOSIS — I10 BENIGN ESSENTIAL HTN: ICD-10-CM

## 2022-05-24 DIAGNOSIS — F41.1 GENERALIZED ANXIETY DISORDER: ICD-10-CM

## 2022-05-24 PROCEDURE — 99213 OFFICE O/P EST LOW 20 MIN: CPT | Performed by: ORTHOPAEDIC SURGERY

## 2022-06-06 PROCEDURE — 87635 SARS-COV-2 COVID-19 AMP PRB: CPT | Performed by: FAMILY MEDICINE

## 2022-06-14 ENCOUNTER — HOSPITAL ENCOUNTER (EMERGENCY)
Facility: HOSPITAL | Age: 57
Discharge: PSYCHIATRIC OR SUBSTANCE USE FACILITY | End: 2022-06-14
Attending: STUDENT IN AN ORGANIZED HEALTH CARE EDUCATION/TRAINING PROGRAM | Admitting: STUDENT IN AN ORGANIZED HEALTH CARE EDUCATION/TRAINING PROGRAM

## 2022-06-14 ENCOUNTER — HOSPITAL ENCOUNTER (INPATIENT)
Facility: HOSPITAL | Age: 57
LOS: 3 days | Discharge: HOME | End: 2022-06-17
Attending: PSYCHIATRY & NEUROLOGY | Admitting: PSYCHIATRY & NEUROLOGY

## 2022-06-14 VITALS
WEIGHT: 126 LBS | DIASTOLIC BLOOD PRESSURE: 69 MMHG | BODY MASS INDEX: 22.32 KG/M2 | HEIGHT: 63 IN | TEMPERATURE: 97.6 F | RESPIRATION RATE: 18 BRPM | HEART RATE: 69 BPM | SYSTOLIC BLOOD PRESSURE: 121 MMHG | OXYGEN SATURATION: 99 %

## 2022-06-14 DIAGNOSIS — F41.9 ANXIETY: Primary | ICD-10-CM

## 2022-06-14 DIAGNOSIS — E87.1 HYPONATREMIA: ICD-10-CM

## 2022-06-14 DIAGNOSIS — R45.851 SUICIDAL THOUGHTS: ICD-10-CM

## 2022-06-14 LAB
ALBUMIN SERPL-MCNC: 4.6 G/DL (ref 3.5–5.2)
ALBUMIN/GLOB SERPL: 1.8 G/DL
ALP SERPL-CCNC: 42 U/L (ref 39–117)
ALT SERPL W P-5'-P-CCNC: 9 U/L (ref 1–33)
AMPHET+METHAMPHET UR QL: NEGATIVE
AMPHETAMINES UR QL: NEGATIVE
ANION GAP SERPL CALCULATED.3IONS-SCNC: 9 MMOL/L (ref 5–15)
APAP SERPL-MCNC: <5 MCG/ML (ref 0–30)
AST SERPL-CCNC: 17 U/L (ref 1–32)
BARBITURATES UR QL SCN: NEGATIVE
BASOPHILS # BLD AUTO: 0.06 10*3/MM3 (ref 0–0.2)
BASOPHILS NFR BLD AUTO: 0.5 % (ref 0–1.5)
BENZODIAZ UR QL SCN: POSITIVE
BILIRUB SERPL-MCNC: 0.4 MG/DL (ref 0–1.2)
BILIRUB UR QL STRIP: NEGATIVE
BUN SERPL-MCNC: 5 MG/DL (ref 6–20)
BUN/CREAT SERPL: 6.4 (ref 7–25)
BUPRENORPHINE SERPL-MCNC: NEGATIVE NG/ML
CALCIUM SPEC-SCNC: 9.2 MG/DL (ref 8.6–10.5)
CANNABINOIDS SERPL QL: NEGATIVE
CHLORIDE SERPL-SCNC: 92 MMOL/L (ref 98–107)
CLARITY UR: CLEAR
CO2 SERPL-SCNC: 23 MMOL/L (ref 22–29)
COCAINE UR QL: NEGATIVE
COLOR UR: YELLOW
CREAT SERPL-MCNC: 0.78 MG/DL (ref 0.57–1)
DEPRECATED RDW RBC AUTO: 43.8 FL (ref 37–54)
EGFRCR SERPLBLD CKD-EPI 2021: 87.6 ML/MIN/1.73
EOSINOPHIL # BLD AUTO: 0.1 10*3/MM3 (ref 0–0.4)
EOSINOPHIL NFR BLD AUTO: 0.9 % (ref 0.3–6.2)
ERYTHROCYTE [DISTWIDTH] IN BLOOD BY AUTOMATED COUNT: 13.3 % (ref 12.3–15.4)
ETHANOL BLD-MCNC: <10 MG/DL (ref 0–10)
ETHANOL UR QL: <0.01 %
FLUAV SUBTYP SPEC NAA+PROBE: NOT DETECTED
FLUBV RNA ISLT QL NAA+PROBE: NOT DETECTED
GLOBULIN UR ELPH-MCNC: 2.6 GM/DL
GLUCOSE BLDC GLUCOMTR-MCNC: 114 MG/DL (ref 70–130)
GLUCOSE SERPL-MCNC: 104 MG/DL (ref 65–99)
GLUCOSE UR STRIP-MCNC: NEGATIVE MG/DL
HCT VFR BLD AUTO: 38.3 % (ref 34–46.6)
HGB BLD-MCNC: 13.8 G/DL (ref 12–15.9)
HGB UR QL STRIP.AUTO: NEGATIVE
HOLD SPECIMEN: NORMAL
HOLD SPECIMEN: NORMAL
IMM GRANULOCYTES # BLD AUTO: 0.08 10*3/MM3 (ref 0–0.05)
IMM GRANULOCYTES NFR BLD AUTO: 0.7 % (ref 0–0.5)
KETONES UR QL STRIP: ABNORMAL
LEUKOCYTE ESTERASE UR QL STRIP.AUTO: NEGATIVE
LYMPHOCYTES # BLD AUTO: 2.91 10*3/MM3 (ref 0.7–3.1)
LYMPHOCYTES NFR BLD AUTO: 25.5 % (ref 19.6–45.3)
MCH RBC QN AUTO: 32.5 PG (ref 26.6–33)
MCHC RBC AUTO-ENTMCNC: 36 G/DL (ref 31.5–35.7)
MCV RBC AUTO: 90.1 FL (ref 79–97)
METHADONE UR QL SCN: NEGATIVE
MONOCYTES # BLD AUTO: 1.33 10*3/MM3 (ref 0.1–0.9)
MONOCYTES NFR BLD AUTO: 11.6 % (ref 5–12)
NEUTROPHILS NFR BLD AUTO: 6.94 10*3/MM3 (ref 1.7–7)
NEUTROPHILS NFR BLD AUTO: 60.8 % (ref 42.7–76)
NITRITE UR QL STRIP: NEGATIVE
NRBC BLD AUTO-RTO: 0 /100 WBC (ref 0–0.2)
OPIATES UR QL: NEGATIVE
OXYCODONE UR QL SCN: NEGATIVE
PCP UR QL SCN: NEGATIVE
PH UR STRIP.AUTO: 6.5 [PH] (ref 5–9)
PLATELET # BLD AUTO: 379 10*3/MM3 (ref 140–450)
PMV BLD AUTO: 7.9 FL (ref 6–12)
POTASSIUM SERPL-SCNC: 4.2 MMOL/L (ref 3.5–5.2)
PROPOXYPH UR QL: NEGATIVE
PROT SERPL-MCNC: 7.2 G/DL (ref 6–8.5)
PROT UR QL STRIP: NEGATIVE
RBC # BLD AUTO: 4.25 10*6/MM3 (ref 3.77–5.28)
SALICYLATES SERPL-MCNC: <0.3 MG/DL
SARS-COV-2 RNA PNL SPEC NAA+PROBE: NOT DETECTED
SODIUM SERPL-SCNC: 124 MMOL/L (ref 136–145)
SP GR UR STRIP: 1.01 (ref 1–1.03)
TRICYCLICS UR QL SCN: NEGATIVE
UROBILINOGEN UR QL STRIP: ABNORMAL
WBC NRBC COR # BLD: 11.42 10*3/MM3 (ref 3.4–10.8)
WHOLE BLOOD HOLD COAG: NORMAL
WHOLE BLOOD HOLD SPECIMEN: NORMAL

## 2022-06-14 PROCEDURE — 82077 ASSAY SPEC XCP UR&BREATH IA: CPT | Performed by: STUDENT IN AN ORGANIZED HEALTH CARE EDUCATION/TRAINING PROGRAM

## 2022-06-14 PROCEDURE — 80306 DRUG TEST PRSMV INSTRMNT: CPT | Performed by: STUDENT IN AN ORGANIZED HEALTH CARE EDUCATION/TRAINING PROGRAM

## 2022-06-14 PROCEDURE — 99284 EMERGENCY DEPT VISIT MOD MDM: CPT

## 2022-06-14 PROCEDURE — 36415 COLL VENOUS BLD VENIPUNCTURE: CPT

## 2022-06-14 PROCEDURE — 81003 URINALYSIS AUTO W/O SCOPE: CPT | Performed by: STUDENT IN AN ORGANIZED HEALTH CARE EDUCATION/TRAINING PROGRAM

## 2022-06-14 PROCEDURE — 80143 DRUG ASSAY ACETAMINOPHEN: CPT | Performed by: STUDENT IN AN ORGANIZED HEALTH CARE EDUCATION/TRAINING PROGRAM

## 2022-06-14 PROCEDURE — C9803 HOPD COVID-19 SPEC COLLECT: HCPCS

## 2022-06-14 PROCEDURE — 82962 GLUCOSE BLOOD TEST: CPT

## 2022-06-14 PROCEDURE — 80053 COMPREHEN METABOLIC PANEL: CPT | Performed by: STUDENT IN AN ORGANIZED HEALTH CARE EDUCATION/TRAINING PROGRAM

## 2022-06-14 PROCEDURE — 80179 DRUG ASSAY SALICYLATE: CPT | Performed by: STUDENT IN AN ORGANIZED HEALTH CARE EDUCATION/TRAINING PROGRAM

## 2022-06-14 PROCEDURE — 85025 COMPLETE CBC W/AUTO DIFF WBC: CPT | Performed by: STUDENT IN AN ORGANIZED HEALTH CARE EDUCATION/TRAINING PROGRAM

## 2022-06-14 PROCEDURE — 87636 SARSCOV2 & INF A&B AMP PRB: CPT | Performed by: STUDENT IN AN ORGANIZED HEALTH CARE EDUCATION/TRAINING PROGRAM

## 2022-06-14 RX ORDER — ONDANSETRON 4 MG/1
4 TABLET, ORALLY DISINTEGRATING ORAL EVERY 6 HOURS PRN
Status: DISCONTINUED | OUTPATIENT
Start: 2022-06-14 | End: 2022-06-17 | Stop reason: HOSPADM

## 2022-06-14 RX ORDER — HYDROXYZINE PAMOATE 50 MG/1
50 CAPSULE ORAL EVERY 6 HOURS PRN
Status: DISCONTINUED | OUTPATIENT
Start: 2022-06-14 | End: 2022-06-17 | Stop reason: HOSPADM

## 2022-06-14 RX ORDER — NICOTINE 21 MG/24HR
1 PATCH, TRANSDERMAL 24 HOURS TRANSDERMAL
Status: DISCONTINUED | OUTPATIENT
Start: 2022-06-14 | End: 2022-06-14 | Stop reason: HOSPADM

## 2022-06-14 RX ORDER — LOPERAMIDE HYDROCHLORIDE 2 MG/1
2 CAPSULE ORAL
Status: DISCONTINUED | OUTPATIENT
Start: 2022-06-14 | End: 2022-06-17 | Stop reason: HOSPADM

## 2022-06-14 RX ORDER — CLONIDINE HYDROCHLORIDE 0.1 MG/1
0.1 TABLET ORAL EVERY 4 HOURS PRN
Status: DISCONTINUED | OUTPATIENT
Start: 2022-06-14 | End: 2022-06-17 | Stop reason: HOSPADM

## 2022-06-14 RX ORDER — ACETAMINOPHEN 325 MG/1
650 TABLET ORAL EVERY 4 HOURS PRN
Status: DISCONTINUED | OUTPATIENT
Start: 2022-06-14 | End: 2022-06-17 | Stop reason: HOSPADM

## 2022-06-14 RX ORDER — TRAZODONE HYDROCHLORIDE 50 MG/1
50 TABLET ORAL NIGHTLY PRN
Status: DISCONTINUED | OUTPATIENT
Start: 2022-06-14 | End: 2022-06-15

## 2022-06-14 RX ORDER — NICOTINE 21 MG/24HR
1 PATCH, TRANSDERMAL 24 HOURS TRANSDERMAL EVERY 24 HOURS
Status: DISCONTINUED | OUTPATIENT
Start: 2022-06-15 | End: 2022-06-17 | Stop reason: HOSPADM

## 2022-06-14 RX ORDER — NICOTINE 21 MG/24HR
1 PATCH, TRANSDERMAL 24 HOURS TRANSDERMAL
Status: DISCONTINUED | OUTPATIENT
Start: 2022-06-14 | End: 2022-06-14

## 2022-06-14 RX ORDER — ALUMINA, MAGNESIA, AND SIMETHICONE 2400; 2400; 240 MG/30ML; MG/30ML; MG/30ML
15 SUSPENSION ORAL EVERY 6 HOURS PRN
Status: DISCONTINUED | OUTPATIENT
Start: 2022-06-14 | End: 2022-06-17 | Stop reason: HOSPADM

## 2022-06-14 RX ADMIN — NICOTINE 1 PATCH: 21 PATCH, EXTENDED RELEASE TRANSDERMAL at 17:39

## 2022-06-14 RX ADMIN — TRAZODONE HYDROCHLORIDE 50 MG: 50 TABLET ORAL at 21:42

## 2022-06-14 RX ADMIN — ACETAMINOPHEN 650 MG: 325 TABLET ORAL at 21:41

## 2022-06-14 RX ADMIN — SODIUM CHLORIDE, POTASSIUM CHLORIDE, SODIUM LACTATE AND CALCIUM CHLORIDE 1000 ML: 600; 310; 30; 20 INJECTION, SOLUTION INTRAVENOUS at 17:38

## 2022-06-14 NOTE — ED TRIAGE NOTES
Pt presents to ED with C/O increased stress due to several personal concerns.  Pt states she has recently been having thoughts of driving herself off a bridge.

## 2022-06-14 NOTE — ED NOTES
This tech is now sitting 1:1 for continuous patient monitoring. Head and shoulders are exposed. All needs met at this time.

## 2022-06-14 NOTE — ED PROVIDER NOTES
Subjective   57-year-old female comes to the ER chief complaint of being unable to deal with life anymore.  Patient states she has had a lot going on in his family come to a boil.  Patient has tried outpatient treatment with her PCP, but has not been helping.  Patient reports having thoughts of wanting to hurt her self.      History provided by:  Patient  History limited by:  Psychiatric disorder   used: No        Review of Systems   Unable to perform ROS: Psychiatric disorder       Past Medical History:   Diagnosis Date   • Anxiety    • Back pain    • Cancer (HCC)     lung   • Hepatitis    • Lung mass    • PONV (postoperative nausea and vomiting)    • Shoulder pain, right        Allergies   Allergen Reactions   • Codeine Hives and Itching   • Morphine Nausea And Vomiting     Severe vomiting   • Adhesive Tape Rash       Past Surgical History:   Procedure Laterality Date   • BREAST AUGMENTATION     • COLONOSCOPY N/A 10/27/2020    Procedure: COLONOSCOPY;  Surgeon: Carlos Enrique Alcantara MD;  Location: [facility] ENDOSCOPY;  Service: Gastroenterology;  Laterality: N/A;   • ENDOSCOPY N/A 10/27/2020    Procedure: ESOPHAGOGASTRODUODENOSCOPY;  Surgeon: Carlos Enrique Alcantara MD;  Location: [facility] ENDOSCOPY;  Service: Gastroenterology;  Laterality: N/A;   • LAPAROSCOPIC TUBAL LIGATION     • LUNG LOBECTOMY Left 08/20/2020    LOWER   • SHOULDER ARTHROSCOPY Right 11/22/2021    Procedure: ARTHROSCOPY OF THE RIGHT SHOULDER WITH SUBACROMIAL DECOMPRESSION, CRISTAL PROCEDURE;  Surgeon: Dario Wilson MD;  Location: [facility] OR;  Service: Orthopedics;  Laterality: Right;   • SPINAL FUSION Right 06/07/2021    R SI joint fusion   • THORACOSCOPY Left 8/20/2020    Procedure: LEFT THORACOSCOPY VIDEO ASSISTED THORACOTOMY pulmonary resection thoracic mediastinal lymphadenectomy bronchoscopy;  Surgeon: Horace Christiansen MD;  Location: [facility] OR;  Service: Cardiothoracic;  Laterality: Left;  Bronch [phone]  VATS  [phone]       Family History   Problem Relation Age of Onset   • Diabetes Other    • Cancer Other    • No Known Problems Mother    • Heart disease Father    • Pancreatic cancer Father    • Colon cancer Maternal Grandfather        Social History     Socioeconomic History   • Marital status: Single   Tobacco Use   • Smoking status: Current Every Day Smoker     Packs/day: 0.50     Years: 30.00     Pack years: 15.00     Types: Cigarettes   • Smokeless tobacco: Never Used   Vaping Use   • Vaping Use: Never used   Substance and Sexual Activity   • Alcohol use: Not Currently   • Drug use: Not Currently     Comment: 30 YRS AGO   • Sexual activity: Not Currently     Birth control/protection: Surgical           Objective    Vitals:    06/14/22 1551 06/14/22 1704 06/14/22 1900   BP: 158/76 122/96 133/63   BP Location:   Left arm   Patient Position: Sitting  Lying   Pulse: 96 68 59   Resp: 18 18    Temp: 97.6 °F (36.4 °C)     TempSrc: Oral     SpO2: 97% 96% 98%   Weight: 57.2 kg (126 lb)     Height: 160 cm (63\")         Physical Exam  Vitals and nursing note reviewed.   Constitutional:       General: She is not in acute distress.     Appearance: She is well-developed. She is not ill-appearing or diaphoretic.   HENT:      Head: Normocephalic.      Right Ear: External ear normal.      Left Ear: External ear normal.   Eyes:      Conjunctiva/sclera: Conjunctivae normal.   Pulmonary:      Effort: Pulmonary effort is normal. No accessory muscle usage or respiratory distress.   Skin:     General: Skin is warm and dry.   Neurological:      Mental Status: She is oriented to person, place, and time.   Psychiatric:         Mood and Affect: Mood is anxious and depressed. Affect is tearful.         Behavior: Behavior normal. Behavior is cooperative.         Thought Content: Thought content includes suicidal ideation. Thought content does not include homicidal ideation. Thought content does not include homicidal or suicidal plan. "         Procedures           ED Course      Results for orders placed or performed during the hospital encounter of 06/14/22   COVID-19 and FLU A/B PCR - Swab, Nasopharynx    Specimen: Nasopharynx; Swab   Result Value Ref Range    COVID19 Not Detected Not Detected - Ref. Range    Influenza A PCR Not Detected Not Detected    Influenza B PCR Not Detected Not Detected   Ethanol    Specimen: Blood   Result Value Ref Range    Ethanol <10 0 - 10 mg/dL    Ethanol % <0.010 %   Urine Drug Screen - Urine, Clean Catch    Specimen: Urine, Clean Catch   Result Value Ref Range    THC, Screen, Urine Negative Negative    Phencyclidine (PCP), Urine Negative Negative    Cocaine Screen, Urine Negative Negative    Methamphetamine, Ur Negative Negative    Opiate Screen Negative Negative    Amphetamine Screen, Urine Negative Negative    Benzodiazepine Screen, Urine Positive (A) Negative    Tricyclic Antidepressants Screen Negative Negative    Methadone Screen, Urine Negative Negative    Barbiturates Screen, Urine Negative Negative    Oxycodone Screen, Urine Negative Negative    Propoxyphene Screen Negative Negative    Buprenorphine, Screen, Urine Negative Negative   Urinalysis With Microscopic If Indicated (No Culture) - Urine, Clean Catch    Specimen: Urine, Clean Catch   Result Value Ref Range    Color, UA Yellow Yellow, Straw, Dark Yellow, Karen    Appearance, UA Clear Clear    pH, UA 6.5 5.0 - 9.0    Specific Gravity, UA 1.006 1.003 - 1.030    Glucose, UA Negative Negative    Ketones, UA Trace (A) Negative    Bilirubin, UA Negative Negative    Blood, UA Negative Negative    Protein, UA Negative Negative    Leuk Esterase, UA Negative Negative    Nitrite, UA Negative Negative    Urobilinogen, UA 0.2 E.U./dL 0.2 - 1.0 E.U./dL   Comprehensive Metabolic Panel    Specimen: Blood   Result Value Ref Range    Glucose 104 (H) 65 - 99 mg/dL    BUN 5 (L) 6 - 20 mg/dL    Creatinine 0.78 0.57 - 1.00 mg/dL    Sodium 124 (L) 136 - 145 mmol/L     Potassium 4.2 3.5 - 5.2 mmol/L    Chloride 92 (L) 98 - 107 mmol/L    CO2 23.0 22.0 - 29.0 mmol/L    Calcium 9.2 8.6 - 10.5 mg/dL    Total Protein 7.2 6.0 - 8.5 g/dL    Albumin 4.60 3.50 - 5.20 g/dL    ALT (SGPT) 9 1 - 33 U/L    AST (SGOT) 17 1 - 32 U/L    Alkaline Phosphatase 42 39 - 117 U/L    Total Bilirubin 0.4 0.0 - 1.2 mg/dL    Globulin 2.6 gm/dL    A/G Ratio 1.8 g/dL    BUN/Creatinine Ratio 6.4 (L) 7.0 - 25.0    Anion Gap 9.0 5.0 - 15.0 mmol/L    eGFR 87.6 >60.0 mL/min/1.73   Acetaminophen Level    Specimen: Blood   Result Value Ref Range    Acetaminophen <5.0 0.0 - 30.0 mcg/mL   Salicylate Level    Specimen: Blood   Result Value Ref Range    Salicylate <0.3 <=30.0 mg/dL   CBC Auto Differential    Specimen: Blood   Result Value Ref Range    WBC 11.42 (H) 3.40 - 10.80 10*3/mm3    RBC 4.25 3.77 - 5.28 10*6/mm3    Hemoglobin 13.8 12.0 - 15.9 g/dL    Hematocrit 38.3 34.0 - 46.6 %    MCV 90.1 79.0 - 97.0 fL    MCH 32.5 26.6 - 33.0 pg    MCHC 36.0 (H) 31.5 - 35.7 g/dL    RDW 13.3 12.3 - 15.4 %    RDW-SD 43.8 37.0 - 54.0 fl    MPV 7.9 6.0 - 12.0 fL    Platelets 379 140 - 450 10*3/mm3    Neutrophil % 60.8 42.7 - 76.0 %    Lymphocyte % 25.5 19.6 - 45.3 %    Monocyte % 11.6 5.0 - 12.0 %    Eosinophil % 0.9 0.3 - 6.2 %    Basophil % 0.5 0.0 - 1.5 %    Immature Grans % 0.7 (H) 0.0 - 0.5 %    Neutrophils, Absolute 6.94 1.70 - 7.00 10*3/mm3    Lymphocytes, Absolute 2.91 0.70 - 3.10 10*3/mm3    Monocytes, Absolute 1.33 (H) 0.10 - 0.90 10*3/mm3    Eosinophils, Absolute 0.10 0.00 - 0.40 10*3/mm3    Basophils, Absolute 0.06 0.00 - 0.20 10*3/mm3    Immature Grans, Absolute 0.08 (H) 0.00 - 0.05 10*3/mm3    nRBC 0.0 0.0 - 0.2 /100 WBC   POC Glucose Once    Specimen: Blood   Result Value Ref Range    Glucose 114 70 - 130 mg/dL   Green Top (Gel)   Result Value Ref Range    Extra Tube Hold for add-ons.    Lavender Top   Result Value Ref Range    Extra Tube hold for add-on    Gold Top - SST   Result Value Ref Range    Extra Tube Hold  for add-ons.    Light Blue Top   Result Value Ref Range    Extra Tube Hold for add-ons.            MDM  Number of Diagnoses or Management Options  Anxiety: new and requires workup  Hyponatremia: new and requires workup  Suicidal thoughts: new and requires workup  Diagnosis management comments: Vital signs are stable, afebrile.  Labs are unremarkable.  Sodium noted to be 124, a year ago it was 131.  Patient received a liter of LR.  Spoke with the psychiatrist who agreed that the patient is medically cleared.  Evaluated by psych who recommended admission to the behavioral health unit for further evaluation and treatment.       Amount and/or Complexity of Data Reviewed  Decide to obtain previous medical records or to obtain history from someone other than the patient: yes        Final diagnoses:   Anxiety   Suicidal thoughts   Hyponatremia       ED Disposition  ED Disposition     ED Disposition   DC/Transfer to Behavioral Health    Condition   Stable    Comment   --             No follow-up provider specified.       Medication List      No changes were made to your prescriptions during this visit.          Deepak Brito MD  06/14/22 9638

## 2022-06-15 PROBLEM — F33.2 SEVERE RECURRENT MAJOR DEPRESSION WITHOUT PSYCHOTIC FEATURES (HCC): Status: ACTIVE | Noted: 2022-06-15

## 2022-06-15 PROBLEM — F41.1 GENERALIZED ANXIETY DISORDER: Status: ACTIVE | Noted: 2022-06-15

## 2022-06-15 LAB
ALBUMIN SERPL-MCNC: 3.7 G/DL (ref 3.5–5.2)
ALBUMIN/GLOB SERPL: 1.5 G/DL
ALP SERPL-CCNC: 35 U/L (ref 39–117)
ALT SERPL W P-5'-P-CCNC: 7 U/L (ref 1–33)
ANION GAP SERPL CALCULATED.3IONS-SCNC: 8 MMOL/L (ref 5–15)
AST SERPL-CCNC: 14 U/L (ref 1–32)
BILIRUB SERPL-MCNC: 0.3 MG/DL (ref 0–1.2)
BUN SERPL-MCNC: 7 MG/DL (ref 6–20)
BUN/CREAT SERPL: 9.5 (ref 7–25)
CALCIUM SPEC-SCNC: 9.1 MG/DL (ref 8.6–10.5)
CHLORIDE SERPL-SCNC: 103 MMOL/L (ref 98–107)
CHOLEST SERPL-MCNC: 225 MG/DL (ref 0–200)
CO2 SERPL-SCNC: 25 MMOL/L (ref 22–29)
CREAT SERPL-MCNC: 0.74 MG/DL (ref 0.57–1)
EGFRCR SERPLBLD CKD-EPI 2021: 93.3 ML/MIN/1.73
GLOBULIN UR ELPH-MCNC: 2.4 GM/DL
GLUCOSE SERPL-MCNC: 80 MG/DL (ref 65–99)
HDLC SERPL-MCNC: 51 MG/DL (ref 40–60)
LDLC SERPL CALC-MCNC: 155 MG/DL (ref 0–100)
LDLC/HDLC SERPL: 2.99 {RATIO}
POTASSIUM SERPL-SCNC: 4.2 MMOL/L (ref 3.5–5.2)
PROT SERPL-MCNC: 6.1 G/DL (ref 6–8.5)
QT INTERVAL: 432 MS
QTC INTERVAL: 427 MS
SODIUM SERPL-SCNC: 136 MMOL/L (ref 136–145)
TRIGL SERPL-MCNC: 108 MG/DL (ref 0–150)
VLDLC SERPL-MCNC: 19 MG/DL (ref 5–40)
WHOLE BLOOD HOLD SPECIMEN: NORMAL

## 2022-06-15 PROCEDURE — 90836 PSYTX W PT W E/M 45 MIN: CPT | Performed by: NURSE PRACTITIONER

## 2022-06-15 PROCEDURE — 80053 COMPREHEN METABOLIC PANEL: CPT | Performed by: PSYCHIATRY & NEUROLOGY

## 2022-06-15 PROCEDURE — 99223 1ST HOSP IP/OBS HIGH 75: CPT | Performed by: PSYCHIATRY & NEUROLOGY

## 2022-06-15 PROCEDURE — 80061 LIPID PANEL: CPT | Performed by: PSYCHIATRY & NEUROLOGY

## 2022-06-15 PROCEDURE — 93005 ELECTROCARDIOGRAM TRACING: CPT | Performed by: PSYCHIATRY & NEUROLOGY

## 2022-06-15 RX ORDER — FLUOXETINE 10 MG/1
10 CAPSULE ORAL DAILY
Status: DISCONTINUED | OUTPATIENT
Start: 2022-06-15 | End: 2022-06-15

## 2022-06-15 RX ORDER — MIRTAZAPINE 15 MG/1
7.5 TABLET, FILM COATED ORAL NIGHTLY
Status: DISCONTINUED | OUTPATIENT
Start: 2022-06-15 | End: 2022-06-16

## 2022-06-15 RX ORDER — CETIRIZINE HYDROCHLORIDE 10 MG/1
10 TABLET ORAL DAILY
Status: DISCONTINUED | OUTPATIENT
Start: 2022-06-15 | End: 2022-06-17 | Stop reason: HOSPADM

## 2022-06-15 RX ORDER — FLUTICASONE PROPIONATE 50 MCG
2 SPRAY, SUSPENSION (ML) NASAL DAILY
Status: DISCONTINUED | OUTPATIENT
Start: 2022-06-15 | End: 2022-06-17 | Stop reason: HOSPADM

## 2022-06-15 RX ADMIN — CETIRIZINE HYDROCHLORIDE 10 MG: 10 TABLET, FILM COATED ORAL at 14:50

## 2022-06-15 RX ADMIN — ACETAMINOPHEN 650 MG: 325 TABLET ORAL at 08:45

## 2022-06-15 RX ADMIN — HYDROXYZINE PAMOATE 50 MG: 50 CAPSULE ORAL at 00:46

## 2022-06-15 RX ADMIN — FLUTICASONE PROPIONATE 2 SPRAY: 50 SPRAY, METERED NASAL at 14:50

## 2022-06-15 RX ADMIN — ACETAMINOPHEN 650 MG: 325 TABLET ORAL at 20:09

## 2022-06-15 RX ADMIN — NICOTINE 1 PATCH: 21 PATCH, EXTENDED RELEASE TRANSDERMAL at 08:45

## 2022-06-15 RX ADMIN — Medication 7.5 MG: at 20:05

## 2022-06-15 RX ADMIN — ACETAMINOPHEN 650 MG: 325 TABLET ORAL at 15:02

## 2022-06-15 NOTE — PLAN OF CARE
Goal Outcome Evaluation:  Plan of Care Reviewed With: patient  Patient Agreement with Plan of Care: agrees        Outcome Evaluation: New admission

## 2022-06-15 NOTE — PLAN OF CARE
Problem: Adult Behavioral Health Plan of Care  Goal: Optimized Coping Skills in Response to Life Stressors  Outcome: Ongoing, Progressing   Goal Outcome Evaluation:      Met with patient and completed Recreation Therapy Assessment.  Patient reports that she is employed and her elderly mother lives with her.  She identifies many stressors and became tearful discussing them.  She states that she has too much responsibility to engage in recreational activity and she is waiting for halfway so she can do what she wants.  Patient reports that she has no way to cope with stress and her anxiety and depression have become too much to handle.  Will continue to encourage group participation and identification of healthy outlets and coping skills.

## 2022-06-15 NOTE — NURSING NOTE
"Patient went to follow up appointment today at [facility] with Dr. Rosario Mckeon for anxiety and depression. Per report from [facility], \"Pt states that she feels as though her nerves are getting the best of her and that she feels like she is a threat to herself and others. Pt states that she has a lot going on with her 80 yo mother staying with her and the fueding with her sister has become more than she can tolerate. Pt daughter has had recent changes in her mental health as well and pt has been concerned for her adding to her stress\". Patient has no prior psych hospitalizations and/or suicide attempts. With patients last office visit her Prozac was increased to 20mg and she did not like the way it made her feel so she went back to the 10mg.   "

## 2022-06-15 NOTE — PLAN OF CARE
Goal Outcome Evaluation:              Outcome Evaluation: pt is new admission this shift, has hd 6.25 hour of restful sleep after receiving trazodone and vistaril earlier

## 2022-06-15 NOTE — ED NOTES
"Patient agitated at this time stating \"if they aren't going to help me they can just send my ass home\"   "

## 2022-06-15 NOTE — PLAN OF CARE
Goal Outcome Evaluation:  Plan of Care Reviewed With: patient  Patient Agreement with Plan of Care: agrees     Progress: improving  Outcome Evaluation: Patient has been attending group activities and has had less tearfulness.  She has had a headache which she states is d/t allergies this time of year.  N/O received and administered.

## 2022-06-15 NOTE — PLAN OF CARE
"Treatment team met with patient to discuss and review treatment plan. Pt was encouraged to discuss their reason for admission, as well as their goals for treatment. Team discussed anticipated interventions and treatment modalities that will be used to address goals.Pt given opportunity to discuss any concerns re: treatment plan.  Reason for admission: Difficult times, daughter is schizophrenic, mother likely to have dementia, Difficulty coping. Worry she isnt stable enough. Feels her daughter is getting better and she is falling apart. Feels like getting in the car and bailing \"might do something stupid\"  Goals: to get stable, discuss out patient therapy, go to groups, discuss medication options    Team Members present during meeting:    MD: Dr Mechelle LUQUEN: Nayla Garcia  RN: Summer Coto Autumn  SW: Aida Torres  RT: Karin Torres  Other:                    "

## 2022-06-15 NOTE — ED NOTES
This tech remains sitting 1:1 for continuous patient monitoring, head and shoulders are exposed. All needs met at this time.

## 2022-06-15 NOTE — PROGRESS NOTES
"DATA:           MSW met individually with patient this date to introduce role and to discuss hospitalization expectations. Patient agreeable. Reviewed medical record and staffed case with treatment team this date. No major issues identified.       Clinical Maneuvering/Intervention:     MSW assisted patient in processing above session content; acknowledged and normalized patient’s thoughts, feelings, and concerns.  Discussed the therapist/patient relationship and explain the parameters and limitations of relative confidentiality.  Also discussed the importance of active participation, and honesty to the treatment process.  Encouraged the patient to discuss/vent their feelings, frustrations, and fears concerning their ongoing medical issues and validated their feelings.     Allowed patient to freely discuss issues without interruption or judgment. Provided safe, confidential environment to facilitate the development of positive therapeutic relationship and encourage open, honest communication.      MSW addressed discharge safety planning this date. Assisted patient in identifying risk factors which would indicate the need for higher level of care after discharge;  including thoughts to harm self or others and/or self-harming behavior. Encouraged patient to call 911, or present to the nearest emergency room should any of these events occur. Discussed crisis intervention services and means to access.  Encouraged securing any objects of harm.       MSW completed integrated summary, treatment plan, and initiated social history this date.  MSW is strongly encouraging family involvement in treatment.       ASSESSMENT:      The patient is a 57 y\o adult female. Pt is A&Ox4. Pt denies any SI\HI\AVH, however does report, \"at time I have thoughts of hurting others.\" (MICHAEL Garcia notified of pts statement). Pt would not share plan or how she would hurt others, or who pt was referring to. Pt reports feeling \"extremely " "overwhelmed.\" Pt described this as \"drowning.\" Pt rates depression 8/10 and anxiety 5/10. Pt reports she has one friend, Marixa. Pt reports she only has one support person other than her friend and this is her son, Theron. Pt reports that at times she \"feels like I'm drifitng away.\" Pt asked to clarify, and pt did not. Pt observed to be tearful. Pt reports problems falling asleep and staying asleep. Pt reports that she usually wakes around 330am. Pt denies any guns in her home.     Mental Status Exam:    Hygiene:   good  Cooperation:  Cooperative  Eye Contact:  Good  Psychomotor Behavior:  Restless  Affect:  Appropriate  Speech:  Rapid and Rambling  Flight of ieas  Thought Content:  Mood congruent  Suicidal:  None  Homicidal:  HI Homicidal Ideation  Hallucinations:  None  Delusion:  None  Memory:  Intact  Orientation:  Person, Place, Time and Situation  Reliability:  good  Insight:  Fair  Judgement:  Fair  Impulse Control:  Fair      Goals for treatment: Pt would like to \"get better, go home, and get back with life.\"     Prior Hospitalizations / Dates: denies     Childhood History:  denies any abuse as a child     Suicide Attempts:  denies     Alcohol:  denies    Substances: denies illegal substances, uses tobacco      Sexual:    heterosexual, no issues discussed     Education:   high school diploma, vocational training     Access to firearms:  denies        PLAN:       Patient to remain hospitalized this date.      Treatment team will focus efforts on stabilizing patient's acute symptoms while providing education on healthy coping and crisis management to reduce hospitalizations.   Patient requires daily psychiatrist evaluation and 24/7 nursing supervision to promote patient  safety.     MSW will offer 1-4 individual sessions, family education, and appropriate referral.     MSW recommends  Pt to remain hospitalized, referral to [facility], after care  "

## 2022-06-15 NOTE — PLAN OF CARE
Problem: Adult Behavioral Health Plan of Care  Goal: Patient-Specific Goal (Individualization)  Outcome: Ongoing, Progressing  Flowsheets  Taken 6/15/2022 1422  Patient-Specific Goals (Include Timeframe): Pt to have no SI\Hi\AVH upon discharge and verbalized two learned coping skills  Individualized Care Needs: 1-4 ind sessions, group, education, safety planning  Anxieties, Fears or Concerns: fear of not being able to take care of her daughter or mother  Taken 6/15/2022 1357  Patient Personal Strengths:   ability to maintain sobriety   appropriate judgment/decision-making   expressive of emotions   expressive of needs   motivated for treatment   motivated for recovery   resilient   tolerant   positive vocational history   positive educational history   positive attitude   no history of violence   self-awareness   self-reliant   socioeconomic stability  Patient Vulnerabilities:   poor physical health   limited support system   traumatic event  Goal: Optimized Coping Skills in Response to Life Stressors  Outcome: Ongoing, Progressing  Flowsheets (Taken 6/15/2022 1422)  Optimized Coping Skills in Response to Life Stressors: making progress toward outcome  Intervention: Promote Effective Coping Strategies  Flowsheets (Taken 6/15/2022 1422)  Supportive Measures:   active listening utilized   counseling provided   verbalization of feelings encouraged  Goal: Develops/Participates in Therapeutic Addison to Support Successful Transition  Outcome: Ongoing, Progressing  Flowsheets (Taken 6/15/2022 1422)  Develops/Participates in Therapeutic Addison to Support Successful Transition: making progress toward outcome  Intervention: Foster Therapeutic Addison  Flowsheets (Taken 6/15/2022 1422)  Trust Relationship/Rapport:   care explained   reassurance provided   choices provided   thoughts/feelings acknowledged   emotional support provided   empathic listening provided   questions answered   questions encouraged  Intervention:  Mutually Develop Transition Plan  Flowsheets  Taken 6/15/2022 1422  Outpatient/Agency/Support Group Needs:   outpatient medication management   outpatient counseling  Transition Support: follow-up care discussed  Anticipated Discharge Disposition: home with family  Taken 6/15/2022 1419  Discharge Coordination/Progress: [facility]  Transportation Anticipated: car, drives self  Transportation Concerns: none  Current Discharge Risk: lack of support system/caregiver  Concerns to be Addressed:   mental health   medication   coping/stress   adjustment to diagnosis/illness   home safety  Readmission Within the Last 30 Days: no previous admission in last 30 days  Patient/Family Anticipated Services at Transition: mental health services  Patient's Choice of Community Agency(s): [facility]  Patient/Family Anticipates Transition to:   home with family   home  Offered/Gave Vendor List: no     Problem: Adult Behavioral Health Plan of Care  Goal: Plan of Care Review  Flowsheets (Taken 6/15/2022 1422)  Consent Given to Review Plan with: Son  Progress: no change  Plan of Care Reviewed With: patient  Patient Agreement with Plan of Care: agrees

## 2022-06-15 NOTE — NURSING NOTE
"Patient arrived to [facility] at 2023 escorted by [employer] and [employer] Patient wanded for contraband prior to entering unit with none found. MHT assisted patient into blue paper scrubs. Unit rules and routine reviewed and discussed with patient.  She verbalizes understanding. Vital signs obtained- VSS. Patient tearful at time of admission. She is cooperative and voices need for help. \"I feel like I am going crazy. It's been a long day.\" Patient voices that her PCP recently increased her Prozac from 10mg to 20 mg and she feels like this is part of her \"Spiraling.\" She notes that she returned to taking her 10mg tablet daily. Patient states, \"I've just had a lot going on. I just have had enough.\" Patient reports that she has chronic back pain and take both Ibuprofen/tylenol \"all day long everyday. I switch them back and forth, but I would die without it.\"  Patient able to complete admission documentation without difficulty. She is provided with sandwich, snack, and drink. She is allowed phone call to notify her mother of admission.   "
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
## 2022-06-15 NOTE — NURSING NOTE
Behavior   Note any precipitants to event or behavior   Describe level and action of any aggressive behavior or speech and associated interventions.     Anxiety: Muscle tension, Decreased sleep and Decreased concentration  Depression: depressed mood, insomnia, difficulty concentrating and hopelessness  Pain  4  AVH   no  S/I   no  Plan  no  H/I   no  Plan  no    Affect   mood-congruent      Note:  Patient is room tearful/sad.  She states that she has had as much as she can take.  She states that she is depressed and anxious.  She states that she does not have a suicide plan but she feels she is not safe from herself - she has reached her limit.  Patient states that her mom is fairly independent and family members are checking on her but she has her mom living with her.  Patient herself has had multiple medical issues in past few years with multiple surgeries.  She states she had CA of lung and had left lower lung removed.  She has a daughter with mental health issues which is fairly under control with medications but she does have breakthrough symptoms and had this recently.  She works at [employer] and states she is hoping to retire in three years but that seems to be an almost unreachable goal at this point.  She states she needs help and has not had any prior psychiatric hospitalizations.      Intervention    PRN medication utilized:  yes - Tylenol for headache    Instructed in medication usage and effects  Medications administered as ordered  Encouraged to verbalize needs      Response    Verbalized understanding   Did patient take medications as ordered yes   Did patient interact with assessment?  yes     Plan    Will monitor for safety  Will monitor every 15 minutes as ordered  Will evaluate and promote the plan of care    Last BM:  unknown date  (Please chart in I/O as well)

## 2022-06-15 NOTE — CONSULTS
[facility] Medicine Consult  Inpatient Hospitalist Consult  Consult performed by: Evangelist Jimenez APRN  Consult ordered by: Dwight Min II, MD          Date of Admission: 6/14/2022  Date of Consult: 06/15/22    Primary Care Physician: Rosario Mckeon MD  Referring Physician:  Dwight Min*      Chief Complaint/Reason for Consultation: Medical co-management, suicidal ideation    HPI:  This is a 57 year old female with a history of lung cancer s/p lobectomy, anxiety, and depression who is admitted to behavioral health due to suicidal ideations. She complains of seasonal allergies.      Past Medical History:   Past Medical History:   Diagnosis Date   • Anxiety    • Back pain    • Cancer (HCC)     lung   • Hepatitis    • Lung mass    • PONV (postoperative nausea and vomiting)    • Shoulder pain, right        Past Surgical History:   Past Surgical History:   Procedure Laterality Date   • BREAST AUGMENTATION     • COLONOSCOPY N/A 10/27/2020    Procedure: COLONOSCOPY;  Surgeon: Carlos Enrique Alcantara MD;  Location: [facility] ENDOSCOPY;  Service: Gastroenterology;  Laterality: N/A;   • ENDOSCOPY N/A 10/27/2020    Procedure: ESOPHAGOGASTRODUODENOSCOPY;  Surgeon: Carlos Enrique Alcantara MD;  Location: [facility] ENDOSCOPY;  Service: Gastroenterology;  Laterality: N/A;   • LAPAROSCOPIC TUBAL LIGATION     • LUNG LOBECTOMY Left 08/20/2020    LOWER   • SHOULDER ARTHROSCOPY Right 11/22/2021    Procedure: ARTHROSCOPY OF THE RIGHT SHOULDER WITH SUBACROMIAL DECOMPRESSION, CRISTAL PROCEDURE;  Surgeon: Dario Wilson MD;  Location: [facility] OR;  Service: Orthopedics;  Laterality: Right;   • SPINAL FUSION Right 06/07/2021    R SI joint fusion   • THORACOSCOPY Left 8/20/2020    Procedure: LEFT THORACOSCOPY VIDEO ASSISTED THORACOTOMY pulmonary resection thoracic mediastinal lymphadenectomy bronchoscopy;  Surgeon: Horace Christiansen MD;  Location: [facility]  OR;  Service: Cardiothoracic;  Laterality: Left;  [place] [id number]  VATS [id number]       Family History:   Family History   Problem Relation Age of Onset   • Diabetes Other    • Cancer Other    • No Known Problems Mother    • Heart disease Father    • Pancreatic cancer Father    • Colon cancer Maternal Grandfather        Social History:   Social History     Socioeconomic History   • Marital status: Single   • Highest education level: Associate degree: occupational, technical, or vocational program   Tobacco Use   • Smoking status: Current Every Day Smoker     Packs/day: 1.00     Years: 30.00     Pack years: 30.00     Types: Cigarettes   • Smokeless tobacco: Never Used   Vaping Use   • Vaping Use: Never used   Substance and Sexual Activity   • Alcohol use: Not Currently   • Drug use: Not Currently     Comment: 30 YRS AGO   • Sexual activity: Not Currently     Birth control/protection: Surgical       Allergies:   Allergies   Allergen Reactions   • Codeine Hives and Itching   • Morphine Nausea And Vomiting     Severe vomiting   • Adhesive Tape Rash       Medications:   Current Facility-Administered Medications:   •  acetaminophen (TYLENOL) tablet 650 mg, 650 mg, Oral, Q4H PRN, Dwight Min II, MD, 650 mg at 06/15/22 0845  •  aluminum-magnesium hydroxide-simethicone (MAALOX MAX) 400-400-40 MG/5ML suspension 15 mL, 15 mL, Oral, Q6H PRN, Dwight Min II, MD  •  cloNIDine (CATAPRES) tablet 0.1 mg, 0.1 mg, Oral, Q4H PRN, Dwight Min II, MD  •  hydrOXYzine pamoate (VISTARIL) capsule 50 mg, 50 mg, Oral, Q6H PRN, Dwight Min II, MD, 50 mg at 06/15/22 0046  •  loperamide (IMODIUM) capsule 2 mg, 2 mg, Oral, Q2H PRN, Dwight Min II, MD  •  magnesium hydroxide (MILK OF MAGNESIA) suspension 10 mL, 10 mL, Oral, Daily PRN, Dwight Min II, MD  •  mirtazapine (REMERON) half tablet 7.5 mg, 7.5 mg, Oral, Nightly, Nayla Garcia, APRN  •  nicotine (NICODERM CQ) 21 MG/24HR patch 1  patch, 1 patch, Transdermal, Q24H, Dwight Min II, MD, 1 patch at 06/15/22 0845  •  ondansetron ODT (ZOFRAN-ODT) disintegrating tablet 4 mg, 4 mg, Oral, Q6H PRN, Dwight Min II, MD    Review of Systems:  Review of Systems   Constitutional: Negative for chills and fever.   HENT: Positive for congestion and sneezing.    Eyes: Positive for itching. Negative for visual disturbance.   Respiratory: Negative for cough and shortness of breath.    Cardiovascular: Negative for chest pain.   Gastrointestinal: Negative for abdominal pain, nausea and vomiting.   Genitourinary: Negative for difficulty urinating and dysuria.   Musculoskeletal: Negative for arthralgias, back pain, myalgias and neck pain.   Skin: Negative for rash and wound.   Neurological: Negative for dizziness, weakness, light-headedness and headaches.      Otherwise complete ROS is negative except as mentioned above.    Physical Exam:   Temp:  [97 °F (36.1 °C)-97.6 °F (36.4 °C)] 97 °F (36.1 °C)  Heart Rate:  [59-96] 69  Resp:  [16-20] 16  BP: (104-158)/(59-96) 104/59  Physical Exam  Vitals reviewed.   Constitutional:       General: She is not in acute distress.     Appearance: Normal appearance. She is well-developed. She is not diaphoretic.   HENT:      Head: Normocephalic and atraumatic.   Eyes:      Conjunctiva/sclera: Conjunctivae normal.   Cardiovascular:      Rate and Rhythm: Normal rate and regular rhythm.   Pulmonary:      Effort: Pulmonary effort is normal. No respiratory distress.      Comments: Absent LLL breath sound  Abdominal:      General: Bowel sounds are normal. There is no distension.      Palpations: Abdomen is soft.      Tenderness: There is no abdominal tenderness.   Musculoskeletal:         General: No swelling or deformity.   Skin:     General: Skin is warm and dry.   Neurological:      General: No focal deficit present.      Mental Status: She is alert and oriented to person, place, and time.      Comments: CN I: Sense  of smell intact  CN II: Visual fields intact  CN III,IV,VI: extraocular movements intact  CN V: Masseter strength and sensation in all three divisions intact  CN VII: Smile and eyelid closure symmetrical  CN VIII: Hearing intact  CN IX and X: Voice and palate movement intact  CN XI: Shoulder shrug intact  CN XII: Tongue protrusion and movement intact             Results Reviewed:  I have personally reviewed current lab, radiology, and data and agree with results.  Lab Results (last 24 hours)     Procedure Component Value Units Date/Time    Extra Tubes [[id number]] Collected: 06/15/22 0611    Specimen: Blood, Venous Line Updated: 06/15/22 0717    Narrative:      The following orders were created for panel order Extra Tubes.  Procedure                               Abnormality         Status                     ---------                               -----------         ------                     Lavender Top[[id number]]                                     Final result                 Please view results for these tests on the individual orders.    Lavender Top [[id number]] Collected: 06/15/22 0611    Specimen: Blood Updated: 06/15/22 0717     Extra Tube hold for add-on     Comment: Auto resulted       Comprehensive Metabolic Panel [[id number]]  (Abnormal) Collected: 06/15/22 0538    Specimen: Blood Updated: 06/15/22 0646     Glucose 80 mg/dL      BUN 7 mg/dL      Creatinine 0.74 mg/dL      Sodium 136 mmol/L      Potassium 4.2 mmol/L      Chloride 103 mmol/L      CO2 25.0 mmol/L      Calcium 9.1 mg/dL      Total Protein 6.1 g/dL      Albumin 3.70 g/dL      ALT (SGPT) 7 U/L      AST (SGOT) 14 U/L      Alkaline Phosphatase 35 U/L      Total Bilirubin 0.3 mg/dL      Globulin 2.4 gm/dL      A/G Ratio 1.5 g/dL      BUN/Creatinine Ratio 9.5     Anion Gap 8.0 mmol/L      eGFR 93.3 mL/min/1.73      Comment: National Kidney Foundation and American Society of Nephrology (ASN) Task Force recommended calculation based on the Chronic  Kidney Disease Epidemiology Collaboration (CKD-EPI) equation refit without adjustment for race.       Narrative:      GFR Normal >60  Chronic Kidney Disease <60  Kidney Failure <15      Lipid Panel [[id number]]  (Abnormal) Collected: 06/15/22 0538    Specimen: Blood Updated: 06/15/22 0644     Total Cholesterol 225 mg/dL      Triglycerides 108 mg/dL      HDL Cholesterol 51 mg/dL      LDL Cholesterol  155 mg/dL      VLDL Cholesterol 19 mg/dL      LDL/HDL Ratio 2.99    Narrative:      Cholesterol Reference Ranges  (U.S. Department of Health and Human Services ATP III Classifications)    Desirable          <200 mg/dL  Borderline High    200-239 mg/dL  High Risk          >240 mg/dL      Triglyceride Reference Ranges  (U.S. Department of Health and Human Services ATP III Classifications)    Normal           <150 mg/dL  Borderline High  150-199 mg/dL  High             200-499 mg/dL  Very High        >500 mg/dL    HDL Reference Ranges  (U.S. Department of Health and Human Services ATP III Classifications)    Low     <40 mg/dl (major risk factor for CHD)  High    >60 mg/dl ('negative' risk factor for CHD)        LDL Reference Ranges  (U.S. Department of Health and Human Services ATP III Classifications)    Optimal          <100 mg/dL  Near Optimal     100-129 mg/dL  Borderline High  130-159 mg/dL  High             160-189 mg/dL  Very High        >189 mg/dL        Imaging Results (Last 24 Hours)     ** No results found for the last 24 hours. **          Assessment:    Suicidal ideation    Season allergies          Recommendations:  1. Psychiatric management per primary team  2. Zyrteabdulaziz, Flonase      Will sign off.  Please do not hesitate to call with questions or changes in the patients condition. Thank you.    I confirmed that the patient's Advance Care Plan is present, code status is documented, or surrogate decision maker is listed in the patient's medical record.     I have utilized all available immediate resources to  obtain, update, or review the patient's current medications.       Evangelist Jimenez, MICHAEL  06/15/22  14:09 CDT

## 2022-06-16 PROCEDURE — 99232 SBSQ HOSP IP/OBS MODERATE 35: CPT | Performed by: NURSE PRACTITIONER

## 2022-06-16 RX ORDER — MIRTAZAPINE 15 MG/1
15 TABLET, FILM COATED ORAL NIGHTLY
Status: DISCONTINUED | OUTPATIENT
Start: 2022-06-16 | End: 2022-06-17 | Stop reason: HOSPADM

## 2022-06-16 RX ADMIN — CETIRIZINE HYDROCHLORIDE 10 MG: 10 TABLET, FILM COATED ORAL at 08:44

## 2022-06-16 RX ADMIN — NICOTINE 1 PATCH: 21 PATCH, EXTENDED RELEASE TRANSDERMAL at 08:45

## 2022-06-16 RX ADMIN — MAGNESIUM HYDROXIDE 10 ML: 2400 SUSPENSION ORAL at 12:36

## 2022-06-16 RX ADMIN — HYDROXYZINE PAMOATE 50 MG: 50 CAPSULE ORAL at 23:25

## 2022-06-16 RX ADMIN — MIRTAZAPINE 15 MG: 15 TABLET, FILM COATED ORAL at 20:32

## 2022-06-16 RX ADMIN — ACETAMINOPHEN 650 MG: 325 TABLET ORAL at 12:35

## 2022-06-16 RX ADMIN — ACETAMINOPHEN 650 MG: 325 TABLET ORAL at 20:32

## 2022-06-16 RX ADMIN — NICOTINE POLACRILEX 2 MG: 2 GUM, CHEWING BUCCAL at 21:13

## 2022-06-16 RX ADMIN — FLUTICASONE PROPIONATE 2 SPRAY: 50 SPRAY, METERED NASAL at 08:44

## 2022-06-16 RX ADMIN — ACETAMINOPHEN 650 MG: 325 TABLET ORAL at 08:44

## 2022-06-16 NOTE — NURSING NOTE
Behavior   Note any precipitants to event or behavior   Describe level and action of any aggressive behavior or speech and associated interventions.     Anxiety: Excess Worry, Decreased sleep and Decreased concentration  Depression: depressed mood and difficulty concentrating  Pain  0  AVH   no  S/I   no  Plan  no  H/I   no  Plan  no    Affect   blunted      Note:  Patient presents with a blunted affect and good eye contact.  Patient reprts 3-4 anxiety and depression.  Patient denies SI, HI, and AVH.  Patient has been med compliant and cooperative.  Patient interacted with select peers and participated in unit activities.  Patient denies pain, discomfort, or any new medical problems.  Patient reports she is eating well, but has had difficulties falling asleep.  Patient reports no other concerns at this time.  Will continue to monitor.      Intervention    PRN medication utilized:  no    Instructed in medication usage and effects  Medications administered as ordered  Encouraged to verbalize needs      Response    Verbalized understanding   Did patient take medications as ordered yes   Did patient interact with assessment?  yes     Plan    Will monitor for safety  Will monitor every 15 minutes as ordered  Will evaluate and promote the plan of care    Last BM:  unknown date  (Please chart in I/O as well)

## 2022-06-16 NOTE — PLAN OF CARE
Goal Outcome Evaluation:  Plan of Care Reviewed With: patient  Patient Agreement with Plan of Care: agrees     Progress: improving  Outcome Evaluation: Patient med compliant and cooperative.  Patient received prn Tylenol for headache which was effective.  Patient observed getting 5.5 hours of sleep so far this shift.

## 2022-06-16 NOTE — PROGRESS NOTES
Called pt's son, Theron [phone] with verbal consent from pt.   Son verified there are no guns in the home right now. Son stated he was surprised mother checked herself in to [facility]. Son stated he is concerned about mother and wants her to get all the help she needs. Son willing to participate in family session. Theron stated pt is able to stay with him if needed after discharge or if pt is feeling overwhelmed by taking care pt's mother and other child.

## 2022-06-16 NOTE — PROGRESS NOTES
Met with pt in pts room. Pt stated she feels better today. Pt stated she was having depression of 4/10 and anxiety 3/10. Pt denies SI\HI\AVH at this time. Discussed with pt a referral for IOP and the commitment needed. Pt agreeable to this. Discussed safety planning and pt granted consent to speak with pt's son, Theron [phone]. Discussed plans for pt going home. Pt stated she still is not sure, but thinks she will see about taking time off due to being in the hospital. Pt stated she is three years from half-way and does not want to put this in jeopardy as to not loose her pension. Pt has been to group and stated this has benefited her.

## 2022-06-16 NOTE — NURSING NOTE
Behavior   Note any precipitants to event or behavior   Describe level and action of any aggressive behavior or speech and associated interventions.     Anxiety: Excess Worry  Depression: depressed mood  Pain  4  AVH   no  S/I   no  Plan  no  H/I   no  Plan  no    Affect   blunted      Note: Pt reports anxiety and depression.  Pt. Reports that she slept well but awoke early due to being out of her element.Pt complains of a headache and was given Tylenol and per patient was effective in treating the pain.  Pt denies SI/HI/AVH.       Intervention    PRN medication utilized:  yes - Tylenol    Instructed in medication usage and effects  Medications administered as ordered  Encouraged to verbalize needs      Response    Verbalized understanding   Did patient take medications as ordered yes   Did patient interact with assessment?  yes     Plan    Will monitor for safety  Will monitor every 15 minutes as ordered  Will evaluate and promote the plan of care    Last BM:  unknown date  (Please chart in I/O as well)

## 2022-06-16 NOTE — PLAN OF CARE
Goal Outcome Evaluation:  Plan of Care Reviewed With: patient  Patient Agreement with Plan of Care: agrees     Progress: improving  Outcome Evaluation: No new issues or concerns noted at this time.

## 2022-06-16 NOTE — PROGRESS NOTES
6/16/2022    Chief Complaint: anxiety and depression    Subjective:  Patient is a 57 y.o. female that is currently inpatient on the adult  U  Today she is seen individually.  Pt is alert and oriented, she reports feeling better and that just removing herself from her home at this time has given her opportunity to think more clearly.    Pt states she did sleep some last night, she is attending groups.   Discussed with patient importance of therapy and how it would help her during this time in life, she agrees.  Information given about [facility].    Pt denies SI/HI/AVH    Objective     Vital Signs    Temp:  [96.7 °F (35.9 °C)-97 °F (36.1 °C)] 96.7 °F (35.9 °C)  Heart Rate:  [53-72] 72  Resp:  [16-18] 18  BP: (103-113)/(62-63) 103/62    Physical Exam:   General Appearance: alert, appears stated age and cooperative,  Hygiene:   good  Gait & Station: Normal  Musculoskeletal: No tremors or abnormal involuntary movements    Mental Status Exam:   Cooperation:  Cooperative  Eye Contact:  Good  Psychomotor Behavior:  Appropriate  Mood: Improving  Affect:  mood-congruent  Speech:  Normal  Thought Process:  Coherent  Associations: Circumstantial  Thought Content:     Mood congruent   Suicidal:  None   Homicidal:  None   Hallucinations:  None   Delusion:  None  Cognitive Functioning:   Consciousness: awake, alert and oriented  Reliability:  fair  Insight:  Fair  Judgement:  Good  Impulse Control:  Good    Lab Results (last 24 hours)     ** No results found for the last 24 hours. **        Imaging Results (Last 24 Hours)     ** No results found for the last 24 hours. **          Medicine:   Current Facility-Administered Medications:   •  acetaminophen (TYLENOL) tablet 650 mg, 650 mg, Oral, Q4H PRN, Dwight Min II, MD, 650 mg at 06/16/22 0844  •  aluminum-magnesium hydroxide-simethicone (MAALOX MAX) 400-400-40 MG/5ML suspension 15 mL, 15 mL, Oral, Q6H PRN, Dwight Min II, MD  •  cetirizine  (zyrTEC) tablet 10 mg, 10 mg, Oral, Daily, Evangelist Jimenez APRN, 10 mg at 06/16/22 0844  •  cloNIDine (CATAPRES) tablet 0.1 mg, 0.1 mg, Oral, Q4H PRN, Dwight Min II, MD  •  fluticasone (FLONASE) 50 MCG/ACT nasal spray 2 spray, 2 spray, Each Nare, Daily, Evangelist Jimenez APRN, 2 spray at 06/16/22 0844  •  hydrOXYzine pamoate (VISTARIL) capsule 50 mg, 50 mg, Oral, Q6H PRN, Dwight Min II, MD, 50 mg at 06/15/22 0046  •  loperamide (IMODIUM) capsule 2 mg, 2 mg, Oral, Q2H PRN, Dwight Min II, MD  •  magnesium hydroxide (MILK OF MAGNESIA) suspension 10 mL, 10 mL, Oral, Daily PRN, Dwight Min II, MD  •  mirtazapine (REMERON) tablet 15 mg, 15 mg, Oral, Nightly, Nayla Garcia APRN  •  nicotine (NICODERM CQ) 21 MG/24HR patch 1 patch, 1 patch, Transdermal, Q24H, Dwight Min II, MD, 1 patch at 06/16/22 0845  •  ondansetron ODT (ZOFRAN-ODT) disintegrating tablet 4 mg, 4 mg, Oral, Q6H PRN, Dwight Min II, MD    Diagnoses/Assessment:     Suicidal ideation    Generalized anxiety disorder    Severe recurrent major depression without psychotic features (HCC)      Treatment Plan:    1) Will continue care for the patient on the behavioral health unit at [facility] to ensure patient safety.  2) Will continue to provide treatment with the unit milieu, activities, therapies and psychopharmacological management.  3) Patient to be placed on or continued on  Q15 minute checks  and Suicide precautions.  4) Pertinent medical issues:   --Allergies: Zyrtec and Flonase   5) Will order following labs: Vit B 12, vit d, CMP in AM  6) Will make the following medication changes:   --Increase Remeron to 15 mg nightly   7) Will continue discharge planning as appropriate for patient.  8) Psychotherapy provided for less than 16 minutes.    Treatment plan and medication risks and benefits discussed with: Patient    MICHAEL Merchant  06/16/22  11:16  CDT

## 2022-06-17 VITALS
DIASTOLIC BLOOD PRESSURE: 60 MMHG | RESPIRATION RATE: 14 BRPM | OXYGEN SATURATION: 95 % | BODY MASS INDEX: 22.32 KG/M2 | HEIGHT: 63 IN | SYSTOLIC BLOOD PRESSURE: 102 MMHG | WEIGHT: 126 LBS | TEMPERATURE: 96.5 F | HEART RATE: 57 BPM

## 2022-06-17 PROBLEM — R45.851 SUICIDAL IDEATION: Status: RESOLVED | Noted: 2022-06-14 | Resolved: 2022-06-17

## 2022-06-17 LAB
25(OH)D3 SERPL-MCNC: 32.8 NG/ML (ref 30–100)
ALBUMIN SERPL-MCNC: 4.2 G/DL (ref 3.5–5.2)
ALBUMIN/GLOB SERPL: 1.8 G/DL
ALP SERPL-CCNC: 39 U/L (ref 39–117)
ALT SERPL W P-5'-P-CCNC: 6 U/L (ref 1–33)
ANION GAP SERPL CALCULATED.3IONS-SCNC: 10 MMOL/L (ref 5–15)
AST SERPL-CCNC: 17 U/L (ref 1–32)
BILIRUB SERPL-MCNC: <0.2 MG/DL (ref 0–1.2)
BUN SERPL-MCNC: 13 MG/DL (ref 6–20)
BUN/CREAT SERPL: 16 (ref 7–25)
CALCIUM SPEC-SCNC: 9.2 MG/DL (ref 8.6–10.5)
CHLORIDE SERPL-SCNC: 102 MMOL/L (ref 98–107)
CO2 SERPL-SCNC: 27 MMOL/L (ref 22–29)
CREAT SERPL-MCNC: 0.81 MG/DL (ref 0.57–1)
EGFRCR SERPLBLD CKD-EPI 2021: 83.7 ML/MIN/1.73
GLOBULIN UR ELPH-MCNC: 2.3 GM/DL
GLUCOSE SERPL-MCNC: 83 MG/DL (ref 65–99)
HOLD SPECIMEN: NORMAL
POTASSIUM SERPL-SCNC: 4.5 MMOL/L (ref 3.5–5.2)
PROT SERPL-MCNC: 6.5 G/DL (ref 6–8.5)
SODIUM SERPL-SCNC: 139 MMOL/L (ref 136–145)
VIT B12 BLD-MCNC: 458 PG/ML (ref 211–946)

## 2022-06-17 PROCEDURE — 82306 VITAMIN D 25 HYDROXY: CPT | Performed by: NURSE PRACTITIONER

## 2022-06-17 PROCEDURE — 80053 COMPREHEN METABOLIC PANEL: CPT | Performed by: NURSE PRACTITIONER

## 2022-06-17 PROCEDURE — 82607 VITAMIN B-12: CPT | Performed by: NURSE PRACTITIONER

## 2022-06-17 PROCEDURE — 90833 PSYTX W PT W E/M 30 MIN: CPT | Performed by: PSYCHIATRY & NEUROLOGY

## 2022-06-17 PROCEDURE — 99238 HOSP IP/OBS DSCHRG MGMT 30/<: CPT | Performed by: PSYCHIATRY & NEUROLOGY

## 2022-06-17 RX ORDER — MIRTAZAPINE 30 MG/1
30 TABLET, FILM COATED ORAL NIGHTLY
Qty: 30 TABLET | Refills: 1 | Status: SHIPPED | OUTPATIENT
Start: 2022-06-17

## 2022-06-17 RX ORDER — NICOTINE 21 MG/24HR
1 PATCH, TRANSDERMAL 24 HOURS TRANSDERMAL EVERY 24 HOURS
Start: 2022-06-18

## 2022-06-17 RX ORDER — HYDROXYZINE PAMOATE 50 MG/1
50 CAPSULE ORAL NIGHTLY PRN
Qty: 30 CAPSULE | Refills: 1 | Status: SHIPPED | OUTPATIENT
Start: 2022-06-17

## 2022-06-17 RX ADMIN — CETIRIZINE HYDROCHLORIDE 10 MG: 10 TABLET, FILM COATED ORAL at 08:07

## 2022-06-17 RX ADMIN — FLUTICASONE PROPIONATE 2 SPRAY: 50 SPRAY, METERED NASAL at 08:08

## 2022-06-17 RX ADMIN — ACETAMINOPHEN 650 MG: 325 TABLET ORAL at 08:12

## 2022-06-17 RX ADMIN — ACETAMINOPHEN 650 MG: 325 TABLET ORAL at 13:02

## 2022-06-17 NOTE — DISCHARGE INSTR - LAB
You have a TELEHEALTH appointment through [facility]  This is for IOP (Intensive Outpatient Therapy Services)    Log into [facility]  Do NOT click on Exam. You will check on VISIT    Telehealth appointment scheduled for June 21, 2022 at 1pm CENTRAL time / 2pm Eastern time.    If you have questions regarding this appointment, please call [phone]     Please log into [facility] prior and verify you have access.

## 2022-06-17 NOTE — PROGRESS NOTES
Called [facility] IOP Program to speak with Rocco. NO answer and VM left for return call to unit.
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
## 2022-06-17 NOTE — PLAN OF CARE
Goal Outcome Evaluation:  Plan of Care Reviewed With: patient  Patient Agreement with Plan of Care: agrees     Progress: improving  Outcome Evaluation: Overnight, pt has rested approximately 5.50 hours to this point, pt received PRN vistaril at her request approximately 2325. No new behavior issues and no other needs voiced. Continuing to monitor for safety and well-being.

## 2022-06-17 NOTE — DISCHARGE INSTRUCTIONS
Crisis HOTLINE [phone]       [facility] ([employer] [place] [facility]) Support group [phone]  Mani Hernandez [phone], Meets every Tuesday, 6-7pm, in person or virtual, Must call to register     Therapy Resources:    Website with various self help therapy resources:  www.[facility].health.[place].gov.[place]/Resources/Looking-After-Yourself    Workbook for panic attacks:  Mastery of Your Anxiety and Panic: Workbook  by Ted Hargrove  (Author), Carmel Pinzon (Author)    If currently sleep meds don't help, speak with primary care about trying belsomra instead of Restoril (temazepam).

## 2022-06-17 NOTE — PROGRESS NOTES
Met with pt this am. Pt given information for employee assistance program through [employer]. Pt stated she would call. [employer] provides 240 days per calendar year of event off if needed for employees. Pt encouraged to call, get something set up. Pt denies any SI\HI\AVH at this time. Pt rates depression and anxiety 4/10 each. Pt A&Ox4. Pt agreeable to IOP and informed pt waiting on return call for apt. Pt discussed possibly going home today or tomorrow. Assisted patient in identifying risk factors which would indicate the need for higher level of care including thoughts to harm self or others and/or self-harming behavior and encouraged patient to  call 911, or present to the nearest emergency room should any of these events occur. Discussed crisis intervention services and means to access.  Patient adamantly and convincingly denies current suicidal or homicidal ideation or perceptual disturbance. Pt verbalized understanding of topics discussed.

## 2022-06-17 NOTE — NURSING NOTE
Patient ambulated from U to discharge home with self.  Patient stable with no s/sx of distress.  All d/c paperwork and follow up appointments discussed with patient.  Patient verbalized understanding.  All paperwork signed.  Personal belongings and home meds returned to patient.

## 2022-06-17 NOTE — NURSING NOTE
"Behavior   Note any precipitants to event or behavior   Describe level and action of any aggressive behavior or speech and associated interventions.     Anxiety: Excess Worry  Depression: insomnia  Pain  2  AVH   no  S/I   no  Plan  no  H/I   no  Plan  no    Affect   mood-congruent      Note: Pt sitting at the dining room alone. Pt is pleasant and cooperative. Pt expressed her fears and worry about \"going back to my life\" at discharge. 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 We discussed the importance of outpatient counseling. Pt voiced understanding. Pt was med compliant, did receive PRN tylenol and Vistaril to help with sleep. Pt reported anxiety at 2/10 and depression 3/10. Pt denies AVH/SI/HI. Pt reports pain 2/10 for rt hip and shoulder pain, confirmed that she has difficulty with sleep. Pt reports no problems with eating or drinking, did receive prn bowel care product during the day with no results. No other needs voiced.    Intervention    PRN medication utilized:  yes - vistaril    Instructed in medication usage and effects  Medications administered as ordered  Encouraged to verbalize needs      Response    Verbalized understanding   Did patient take medications as ordered yes   Did patient interact with assessment?  yes     Plan    Will monitor for safety  Will monitor every 15 minutes as ordered  Will evaluate and promote the plan of care    Last BM:  06/12/22  (Please chart in I/O as well)    "

## 2022-07-19 ENCOUNTER — OFFICE VISIT (OUTPATIENT)
Dept: ORTHOPEDIC SURGERY | Facility: CLINIC | Age: 57
End: 2022-07-19

## 2022-07-19 VITALS — HEIGHT: 63 IN | BODY MASS INDEX: 22.5 KG/M2 | WEIGHT: 127 LBS

## 2022-07-19 DIAGNOSIS — M25.511 CHRONIC RIGHT SHOULDER PAIN: ICD-10-CM

## 2022-07-19 DIAGNOSIS — Z98.890 S/P ARTHROSCOPY OF RIGHT SHOULDER: ICD-10-CM

## 2022-07-19 DIAGNOSIS — M75.01 ADHESIVE CAPSULITIS OF RIGHT SHOULDER: Primary | ICD-10-CM

## 2022-07-19 DIAGNOSIS — I10 BENIGN ESSENTIAL HTN: ICD-10-CM

## 2022-07-19 DIAGNOSIS — G89.29 CHRONIC RIGHT SHOULDER PAIN: ICD-10-CM

## 2022-07-19 PROCEDURE — 99213 OFFICE O/P EST LOW 20 MIN: CPT | Performed by: ORTHOPAEDIC SURGERY

## 2022-07-19 RX ORDER — MELOXICAM 15 MG/1
TABLET ORAL
Qty: 30 TABLET | Refills: 0 | Status: SHIPPED | OUTPATIENT
Start: 2022-07-19 | End: 2022-08-15

## 2022-07-19 NOTE — PROGRESS NOTES
"Alysia Deutsch is a 57 y.o. female returns for     Chief Complaint   Patient presents with   • Right Shoulder - Follow-up       HISTORY OF PRESENT ILLNESS:  F/u right shoulder  She is doing better than she was but she is still having difficulty with her right shoulder.  She continues to work on home exercises.  She continues to work on stretching and strengthening exercises.  No numbness or tingling.  No fevers or chills.         CONCURRENT MEDICAL HISTORY:    The following portions of the patient's history were reviewed and updated as appropriate: allergies, current medications, past family history, past medical history, past social history, past surgical history and problem list.     ROS  No fevers or chills.  No chest pain or shortness of air.  No GI or  disturbances.    PHYSICAL EXAMINATION:       Ht 160 cm (63\")   Wt 57.6 kg (127 lb)   LMP  (LMP Unknown)   BMI 22.50 kg/m²     Physical Exam  Constitutional:       General: She is not in acute distress.     Appearance: Normal appearance.   Pulmonary:      Effort: Pulmonary effort is normal. No respiratory distress.   Neurological:      Mental Status: She is alert and oriented to person, place, and time.         GAIT:     [x]  Normal  []  Antalgic    Assistive device: [x]  None  []  Walker     []  Crutches  []  Cane     []  Wheelchair  []  Stretcher    Right Shoulder Exam     Tenderness   The patient is experiencing no tenderness.    Range of Motion   Active abduction: 130   External rotation: 70   Forward flexion: 150   Internal rotation 0 degrees: L5     Muscle Strength   Abduction: 5/5   Supraspinatus: 5/5     Tests   Juarez test: positive    Other   Erythema: absent  Sensation: normal  Pulse: present                    ASSESSMENT:    Diagnoses and all orders for this visit:    Adhesive capsulitis of right shoulder    Chronic right shoulder pain    S/P arthroscopy of right shoulder    Benign essential HTN    Other orders  -     meloxicam (MOBIC) 15 MG " tablet; 1 PO Daily with food.          PLAN    She will continue to slowly progress activity as tolerated.  She will continue home exercise program.  Slowly increase stretching and strengthening as tolerated.  No restrictions.  We did discuss the possibility of second opinion if she is not improving.  However, she has continued to slowly progress and therefore we agreed to continue to observe for now.  Recheck in approximately 10 weeks for repeat evaluation.    Return in about 10 weeks (around 9/27/2022) for recheck.    Dario Wilson MD

## 2022-08-01 ENCOUNTER — HOSPITAL ENCOUNTER (OUTPATIENT)
Dept: CT IMAGING | Facility: HOSPITAL | Age: 57
Discharge: HOME | End: 2022-08-01
Admitting: THORACIC SURGERY (CARDIOTHORACIC VASCULAR SURGERY)

## 2022-08-01 DIAGNOSIS — C34.92 ADENOCARCINOMA OF LEFT LUNG: ICD-10-CM

## 2022-08-01 PROCEDURE — 71250 CT THORAX DX C-: CPT

## 2022-08-08 ENCOUNTER — OFFICE VISIT (OUTPATIENT)
Dept: CARDIAC SURGERY | Facility: CLINIC | Age: 57
End: 2022-08-08

## 2022-08-08 VITALS
BODY MASS INDEX: 23.14 KG/M2 | WEIGHT: 130.6 LBS | HEIGHT: 63 IN | DIASTOLIC BLOOD PRESSURE: 62 MMHG | SYSTOLIC BLOOD PRESSURE: 108 MMHG | OXYGEN SATURATION: 97 % | HEART RATE: 74 BPM | TEMPERATURE: 98.2 F

## 2022-08-08 DIAGNOSIS — E78.2 MIXED HYPERLIPIDEMIA: ICD-10-CM

## 2022-08-08 DIAGNOSIS — I10 BENIGN ESSENTIAL HTN: ICD-10-CM

## 2022-08-08 DIAGNOSIS — F17.218 NICOTINE DEPENDENCE, CIGARETTES, WITH OTHER NICOTINE-INDUCED DISORDERS: ICD-10-CM

## 2022-08-08 DIAGNOSIS — C34.92 ADENOCARCINOMA OF LEFT LUNG: Primary | ICD-10-CM

## 2022-08-08 PROCEDURE — 99214 OFFICE O/P EST MOD 30 MIN: CPT | Performed by: THORACIC SURGERY (CARDIOTHORACIC VASCULAR SURGERY)

## 2022-08-15 RX ORDER — MELOXICAM 15 MG/1
TABLET ORAL
Qty: 30 TABLET | Refills: 0 | Status: SHIPPED | OUTPATIENT
Start: 2022-08-15 | End: 2022-09-13

## 2022-09-13 RX ORDER — MELOXICAM 15 MG/1
TABLET ORAL
Qty: 30 TABLET | Refills: 0 | Status: SHIPPED | OUTPATIENT
Start: 2022-09-13

## 2022-09-20 ENCOUNTER — OFFICE VISIT (OUTPATIENT)
Dept: ORTHOPEDIC SURGERY | Facility: CLINIC | Age: 57
End: 2022-09-20

## 2022-09-20 VITALS — BODY MASS INDEX: 21.66 KG/M2 | HEIGHT: 65 IN | WEIGHT: 130 LBS

## 2022-09-20 DIAGNOSIS — M25.511 CHRONIC RIGHT SHOULDER PAIN: Primary | ICD-10-CM

## 2022-09-20 DIAGNOSIS — K21.00 GASTROESOPHAGEAL REFLUX DISEASE WITH ESOPHAGITIS WITHOUT HEMORRHAGE: ICD-10-CM

## 2022-09-20 DIAGNOSIS — G89.29 CHRONIC RIGHT SHOULDER PAIN: Primary | ICD-10-CM

## 2022-09-20 DIAGNOSIS — I10 BENIGN ESSENTIAL HTN: ICD-10-CM

## 2022-09-20 DIAGNOSIS — M75.01 ADHESIVE CAPSULITIS OF RIGHT SHOULDER: ICD-10-CM

## 2022-09-20 DIAGNOSIS — Z98.890 S/P ARTHROSCOPY OF RIGHT SHOULDER: ICD-10-CM

## 2022-09-20 PROCEDURE — 99213 OFFICE O/P EST LOW 20 MIN: CPT | Performed by: ORTHOPAEDIC SURGERY

## 2022-09-20 NOTE — PROGRESS NOTES
"Alysia Deutsch is a 57 y.o. female returns for     Chief Complaint   Patient presents with   • Right Shoulder - Follow-up       HISTORY OF PRESENT ILLNESS:    Patient states that she is doing better.  She states that she had a heavy lead fall at work and she reflexively reached out and grabbed it with her right hand to catch it and keep it from falling completely.  She states that when this happened she had a pop in her right shoulder.  She initially had severe pain but found that several hours afterwards she had improvement in her pain and improvement in her motion.  She reports that she has been doing better since that time.  No fevers or chills.     CONCURRENT MEDICAL HISTORY:    The following portions of the patient's history were reviewed and updated as appropriate: allergies, current medications, past family history, past medical history, past social history, past surgical history and problem list.     ROS  No fevers or chills.  No chest pain or shortness of air.  No GI or  disturbances.    PHYSICAL EXAMINATION:       Ht 165.1 cm (65\")   Wt 59 kg (130 lb)   LMP  (LMP Unknown)   BMI 21.63 kg/m²     Physical Exam  Constitutional:       General: She is not in acute distress.     Appearance: Normal appearance.   Pulmonary:      Effort: Pulmonary effort is normal. No respiratory distress.   Neurological:      Mental Status: She is alert and oriented to person, place, and time.           Right Shoulder Exam     Range of Motion   Active abduction: 140   Forward flexion: 160   Internal rotation 0 degrees: L4     Tests   Juarez test: negative    Other   Erythema: absent  Sensation: normal  Pulse: present                      ASSESSMENT:    Diagnoses and all orders for this visit:    Chronic right shoulder pain    Adhesive capsulitis of right shoulder    S/P arthroscopy of right shoulder    Benign essential HTN    Gastroesophageal reflux disease with esophagitis without hemorrhage          PLAN    Patient " reports that she is doing better since this episode that she reports.  She is slowly improving with function and activity in her right shoulder.  She is now hopeful that she is going to have some improvement after she had plateaued for quite some time.  She will continue with home exercises and to restart using pulleys and strengthening exercises.  Continue to slowly progress as tolerated.  No restrictions.  Follow-up as needed.    Return if symptoms worsen or fail to improve, for recheck.    Dario Wilson MD

## 2022-10-12 NOTE — PROGRESS NOTES
8/8/2022    Alysia Deutsch  1965    Chief Complaint:  Lung cancer    HPI:      PCP:  Rosario Mckeon MD  GI:  Dr Alcantara  Ortho: Dr. Gaines     57y.o. female with HTN(stable, increased risk stroke, rupture), Hyperlipidemia(stable, increase risk cardiovascular events), COPD(chronic progression, increase risk pulmonary complications) and Smoker(uncontrolled, increased risk cardiovascular events) insomnia, pulmonary nodule (new,suspicious malignancy).  smokes 0.25 PPD.  asymptomatic ANGELINA pulmonary bullae.  No TIA stroke amaurosis.  No MI claudication.   No other associated signs, symptoms or modifying factors.     6/2020 CT Brain:  No acute findings.  6/2020 CT Chest: 20mm nodule LEFT lower lobe.  No significant adenopathy.  Liver, adrenals ok.  8/2020 PET CT: LLL nodule 22mm (SUV 5.4)  8/2020 PFT:  FVC 2.9 (136%), FEV1 1.8 (103%), DLCO 54%  8/2020 LEFT VAT lower lobectomy  Path: Adenocarcinoma, ([id number])  2/2021 CT Chest: no evidence of recurrence.  Liver adrenals ok.:    8/2021 CT Chest:  No evidence recurrence.  Liver adrenals ok.  40mm RIGHT upper lobe bulla  2/2022 CT Chest:  No evidence recurrence.  Liver adrenals ok.  40mm RIGHT upper lobe bulla  8/2022 CT Chest:  No evidence recurrence.  Liver adrenals ok.  40mm RIGHT upper lobe bulla    The following portions of the patient's history were reviewed and updated as appropriate: allergies, current medications, past family history, past medical history, past social history, past surgical history and problem list.  Recent images independently reviewed.  Available laboratory values reviewed.    PMH:  Past Medical History:   Diagnosis Date   • Anxiety    • Back pain    • Cancer (HCC)     lung   • Hepatitis    • Lung mass    • PONV (postoperative nausea and vomiting)    • Shoulder pain, right      Past Surgical History:   Procedure Laterality Date   • BREAST AUGMENTATION     • COLONOSCOPY N/A 10/27/2020    Procedure: COLONOSCOPY;  Surgeon: Quinton  MD Carlos Enrique;  Location: [facility] ENDOSCOPY;  Service: Gastroenterology;  Laterality: N/A;   • ENDOSCOPY N/A 10/27/2020    Procedure: ESOPHAGOGASTRODUODENOSCOPY;  Surgeon: Carlos Enrique Alcantara MD;  Location: [facility] ENDOSCOPY;  Service: Gastroenterology;  Laterality: N/A;   • LAPAROSCOPIC TUBAL LIGATION     • LUNG LOBECTOMY Left 08/20/2020    LOWER   • SHOULDER ARTHROSCOPY Right 11/22/2021    Procedure: ARTHROSCOPY OF THE RIGHT SHOULDER WITH SUBACROMIAL DECOMPRESSION, [place] PROCEDURE;  Surgeon: Dario Wilson MD;  Location: [facility] OR;  Service: Orthopedics;  Laterality: Right;   • SPINAL FUSION Right 06/07/2021    R SI joint fusion   • THORACOSCOPY Left 8/20/2020    Procedure: LEFT THORACOSCOPY VIDEO ASSISTED THORACOTOMY pulmonary resection thoracic mediastinal lymphadenectomy bronchoscopy;  Surgeon: Horace Christiansen MD;  Location: [facility] OR;  Service: Cardiothoracic;  Laterality: Left;  Bronch [id number]  VATS [phone]     Family History   Problem Relation Age of Onset   • Diabetes Other    • Cancer Other    • No Known Problems Mother    • Heart disease Father    • Pancreatic cancer Father    • Colon cancer Maternal Grandfather      Social History     Tobacco Use   • Smoking status: Every Day     Packs/day: 1.00     Years: 30.00     Pack years: 30.00     Types: Cigarettes   • Smokeless tobacco: Never   Vaping Use   • Vaping Use: Never used   Substance Use Topics   • Alcohol use: Not Currently   • Drug use: Not Currently     Comment: 30 YRS AGO       ALLERGIES:  Allergies   Allergen Reactions   • Codeine Hives and Itching   • Morphine Nausea And Vomiting     Severe vomiting   • Adhesive Tape Rash         MEDICATIONS:    Current Outpatient Medications:   •  acetaminophen (TYLENOL) 500 MG tablet, Take 1,000 mg by mouth Every 6 (Six) Hours As Needed for Mild Pain ., Disp: , Rfl:   •  fluticasone (FLONASE) 50 MCG/ACT nasal spray, 1 spray into the nostril(s) as directed by provider Daily., Disp: , Rfl:   •   "hydrOXYzine pamoate (VISTARIL) 50 MG capsule, Take 1 capsule by mouth At Night As Needed (insomnia). Indications: Insomnia, Disp: 30 capsule, Rfl: 1  •  Loratadine 10 MG capsule, Take 10 mg by mouth Daily., Disp: , Rfl:   •  mirtazapine (REMERON) 30 MG tablet, Take 1 tablet by mouth Every Night. Indications: Major Depressive Disorder, Panic Disorder, Disp: 30 tablet, Rfl: 1  •  temazepam (RESTORIL) 15 MG capsule, Take 15-30 mg by mouth Every Night., Disp: , Rfl:   •  meloxicam (MOBIC) 15 MG tablet, TAKE 1 TABLET BY MOUTH EVERY DAY WITH FOOD, Disp: 30 tablet, Rfl: 0  •  nicotine (NICODERM CQ) 21 MG/24HR patch, Place 1 patch on the skin as directed by provider Daily. Indications: Nicotine Addiction, Disp: , Rfl:     Review of Systems   Review of Systems   Constitutional: Positive for malaise/fatigue. Negative for weight loss.   Cardiovascular: Positive for dyspnea on exertion. Negative for chest pain and claudication.   Respiratory: Negative for cough and shortness of breath.    Skin: Negative for color change and poor wound healing.   Musculoskeletal: Positive for arthritis, joint pain, muscle weakness and neck pain.   Neurological: Negative for dizziness, numbness and weakness.       Physical Exam   Vitals:    08/08/22 1304   BP: 108/62   BP Location: Left arm   Pulse: 74   Temp: 98.2 °F (36.8 °C)   TempSrc: Infrared   SpO2: 97%   Weight: 59.2 kg (130 lb 9.6 oz)   Height: 160 cm (63\")     Body surface area is 1.61 meters squared.  Body mass index is 23.13 kg/m².  Physical Exam  Constitutional:       General: She is not in acute distress.     Appearance: She is not ill-appearing.   HENT:      Right Ear: Hearing normal.      Left Ear: Hearing normal.      Nose: No nasal deformity.      Mouth/Throat:      Dentition: Normal dentition. Does not have dentures.   Cardiovascular:      Rate and Rhythm: Normal rate and regular rhythm.      Pulses:           Carotid pulses are 2+ on the right side and 2+ on the left side.      "  Radial pulses are 2+ on the right side and 2+ on the left side.        Posterior tibial pulses are 2+ on the right side and 2+ on the left side.      Heart sounds: No murmur heard.  Pulmonary:      Effort: Pulmonary effort is normal.      Breath sounds: Normal breath sounds.   Abdominal:      General: There is no distension.      Palpations: Abdomen is soft. There is no mass.      Tenderness: There is no abdominal tenderness.   Musculoskeletal:         General: No deformity.      Comments: Gait normal.    Skin:     General: Skin is warm and dry.      Coloration: Skin is not pale.      Findings: No erythema.      Comments: No venous staining   Neurological:      Mental Status: She is alert and oriented to person, place, and time.   Psychiatric:         Speech: Speech normal.         Behavior: Behavior is cooperative.         Thought Content: Thought content normal.         Judgment: Judgment normal.         BUN   Date Value Ref Range Status   06/17/2022 13 6 - 20 mg/dL Final     Creatinine   Date Value Ref Range Status   06/17/2022 0.81 0.57 - 1.00 mg/dL Final     eGFR Non  Amer   Date Value Ref Range Status   08/21/2020 91 >60 mL/min/1.73 Final       ASSESSMENT:  Diagnoses and all orders for this visit:    1. Adenocarcinoma of left lung (HCC) (Primary)  -     CT Chest Without Contrast Diagnostic; Future    2. Mixed hyperlipidemia    3. Benign essential HTN    4. Nicotine dependence, cigarettes, with other nicotine-induced disorders    PLAN:  Detailed discussion with Alysia Deutsch regarding situation and options.  Stable lung cancer, no evidence recurrence or new primary.  Multiple risk factors with severe comorbidities.  No intervention indicated at this time.  Will follow with interval imaging.  Risks, benefits discussed.  Understands and wishes to proceed with plan.    CT Chest in 6 months     Return after above studies complete  Smoking cessation advised and assistance options offered including  behavioral counseling ([facility] [place] [facility]ess[facility]), Nicotine replacement therapy (patches or gum), pharmacologic therapy (Chantix, Wellbutrin). patient understands that continued use of tobacco products increases risk of heart disease, stroke, cancer; counseling for 3-5min.    Recommended regular physical activity, progressive walking program.  Continue current medications as directed.  Advance Care Planning   ACP discussion was declined by the patient. Patient does not have an advance directive, declines further assistance.     Thank you for the opportunity to participate in this patient's care.    Copy to primary care provider.

## 2023-05-17 ENCOUNTER — HOSPITAL ENCOUNTER (OUTPATIENT)
Dept: CT IMAGING | Facility: HOSPITAL | Age: 58
Discharge: HOME | End: 2023-05-17
Admitting: THORACIC SURGERY (CARDIOTHORACIC VASCULAR SURGERY)
Payer: COMMERCIAL

## 2023-05-17 DIAGNOSIS — C34.92 ADENOCARCINOMA OF LEFT LUNG: ICD-10-CM

## 2023-05-17 PROCEDURE — 71250 CT THORAX DX C-: CPT

## 2023-06-01 ENCOUNTER — OFFICE VISIT (OUTPATIENT)
Dept: CARDIAC SURGERY | Facility: CLINIC | Age: 58
End: 2023-06-01

## 2023-06-01 VITALS
BODY MASS INDEX: 23.81 KG/M2 | WEIGHT: 134.4 LBS | HEART RATE: 86 BPM | DIASTOLIC BLOOD PRESSURE: 74 MMHG | OXYGEN SATURATION: 97 % | SYSTOLIC BLOOD PRESSURE: 118 MMHG | TEMPERATURE: 98.4 F | HEIGHT: 63 IN

## 2023-06-01 DIAGNOSIS — J43.1 PANLOBULAR EMPHYSEMA: ICD-10-CM

## 2023-06-01 DIAGNOSIS — F17.218 NICOTINE DEPENDENCE, CIGARETTES, WITH OTHER NICOTINE-INDUCED DISORDERS: ICD-10-CM

## 2023-06-01 DIAGNOSIS — C34.92 ADENOCARCINOMA OF LEFT LUNG: Primary | ICD-10-CM

## 2023-06-01 DIAGNOSIS — E78.2 MIXED HYPERLIPIDEMIA: ICD-10-CM

## 2023-06-01 PROCEDURE — 99214 OFFICE O/P EST MOD 30 MIN: CPT | Performed by: THORACIC SURGERY (CARDIOTHORACIC VASCULAR SURGERY)

## 2023-06-01 NOTE — LETTER
June 1, 2023     Rosario Mckeon MD  [address]  [facility] [address]    Patient: Alysia Deutsch   YOB: 1965   Date of Visit: 6/1/2023       Dear Dr. Mike MD:    Thank you for referring Alysia Deutsch to me for evaluation. Below are the relevant portions of my assessment and plan of care.    If you have questions, please do not hesitate to call me. I look forward to following Alysia along with you.         Sincerely,        Horace Christiansen MD        CC: No Recipients    Horace Christiansen MD  06/01/23 1416  Sign when Signing Visit  6/1/2023    Alysia Deutsch  1965    Chief Complaint:    Chief Complaint   Patient presents with   • Lung Nodule       HPI:      PCP:  Rosario Mckeon MD  GI:  Dr Alcantara  Ortho: Dr. Gaines     57y.o. female with HTN(stable, increased risk stroke, rupture), Hyperlipidemia(stable, increase risk cardiovascular events), COPD(chronic progression, increase risk pulmonary complications) and Smoker(uncontrolled, increased risk cardiovascular events) insomnia, lung cancer(stable, BEAU) .  smokes 0.5 PPD.  asymptomatic ANGELINA pulmonary bullae.  No TIA stroke amaurosis.  No MI claudication.   No other associated signs, symptoms or modifying factors.     6/2020 CT Brain:  No acute findings.  6/2020 CT Chest: 20mm nodule LEFT lower lobe.  No significant adenopathy.  Liver, adrenals ok.  8/2020 PET CT: LLL nodule 22mm (SUV 5.4)  8/2020 PFT:  FVC 2.9 (136%), FEV1 1.8 (103%), DLCO 54%  8/2020 LEFT VAT lower lobectomy  Path: Adenocarcinoma, ([id number])  2/2021 CT Chest: no evidence of recurrence.  Liver adrenals ok.:    8/2021 CT Chest:  No evidence recurrence.  Liver adrenals ok.  40mm RIGHT upper lobe bulla  2/2022 CT Chest:  No evidence recurrence.  Liver adrenals ok.  40mm RIGHT upper lobe bulla  8/2022 CT Chest:  No evidence recurrence.  Liver adrenals ok.  44mm RIGHT upper lobe bulla  5/2023 CT Chest:  No evidence recurrence.  Liver adrenals ok.   47mm RIGHT upper lobe bulla    The following portions of the patient's history were reviewed and updated as appropriate: allergies, current medications, past family history, past medical history, past social history, past surgical history and problem list.  Recent images independently reviewed.  Available laboratory values reviewed.    PMH:  Past Medical History:   Diagnosis Date   • Anxiety    • Back pain    • Cancer     lung   • Hepatitis    • Lung mass    • PONV (postoperative nausea and vomiting)    • Shoulder pain, right      Past Surgical History:   Procedure Laterality Date   • BREAST AUGMENTATION     • COLONOSCOPY N/A 10/27/2020    Procedure: COLONOSCOPY;  Surgeon: Carlos Enrique Alcantara MD;  Location: [facility] ENDOSCOPY;  Service: Gastroenterology;  Laterality: N/A;   • ENDOSCOPY N/A 10/27/2020    Procedure: ESOPHAGOGASTRODUODENOSCOPY;  Surgeon: Carlos Enrique Alcantara MD;  Location: [facility] ENDOSCOPY;  Service: Gastroenterology;  Laterality: N/A;   • LAPAROSCOPIC TUBAL LIGATION     • LUNG LOBECTOMY Left 08/20/2020    LOWER   • SHOULDER ARTHROSCOPY Right 11/22/2021    Procedure: ARTHROSCOPY OF THE RIGHT SHOULDER WITH SUBACROMIAL DECOMPRESSION, [place] PROCEDURE;  Surgeon: Dario Wilson MD;  Location: [facility] OR;  Service: Orthopedics;  Laterality: Right;   • SPINAL FUSION Right 06/07/2021    R SI joint fusion   • THORACOSCOPY Left 8/20/2020    Procedure: LEFT THORACOSCOPY VIDEO ASSISTED THORACOTOMY pulmonary resection thoracic mediastinal lymphadenectomy bronchoscopy;  Surgeon: Horace Christiansen MD;  Location: [facility] OR;  Service: Cardiothoracic;  Laterality: Left;  [place] [id number]  VATS [phone]     Family History   Problem Relation Age of Onset   • Diabetes Other    • Cancer Other    • No Known Problems Mother    • Heart disease Father    • Pancreatic cancer Father    • Colon cancer Maternal Grandfather      Social History     Tobacco Use   • Smoking status: Every Day     Packs/day: 1.00     Years:  30.00     Pack years: 30.00     Types: Cigarettes     Passive exposure: Current   • Smokeless tobacco: Never   Vaping Use   • Vaping Use: Never used   Substance Use Topics   • Alcohol use: Not Currently   • Drug use: Not Currently     Comment: 30 YRS AGO       ALLERGIES:  Allergies   Allergen Reactions   • Codeine Hives and Itching   • Morphine Nausea And Vomiting     Severe vomiting   • Adhesive Tape Rash         MEDICATIONS:    Current Outpatient Medications:   •  acetaminophen (TYLENOL) 500 MG tablet, Take 2 tablets by mouth Every 6 (Six) Hours As Needed for Mild Pain. Indications: Pain, Disp: , Rfl:   •  fluticasone (FLONASE) 50 MCG/ACT nasal spray, 1 spray into the nostril(s) as directed by provider Daily. Indications: Allergic Rhinitis, Disp: , Rfl:   •  Loratadine 10 MG capsule, Take 1 capsule by mouth Daily. Indications: Hayfever, Disp: , Rfl:   •  meloxicam (MOBIC) 15 MG tablet, TAKE 1 TABLET BY MOUTH EVERY DAY WITH FOOD (Patient taking differently: TAKE 1 TABLET BY MOUTH EVERY DAY WITH FOOD prn), Disp: 30 tablet, Rfl: 0  •  mirtazapine (REMERON) 30 MG tablet, Take 1 tablet by mouth Every Night. Indications: Major Depressive Disorder, Panic Disorder, Disp: 30 tablet, Rfl: 1  •  nicotine (NICODERM CQ) 21 MG/24HR patch, Place 1 patch on the skin as directed by provider Daily. Indications: Nicotine Addiction, Disp: , Rfl:   •  temazepam (RESTORIL) 15 MG capsule, Take 1-2 capsules by mouth Every Night. Indications: Trouble Sleeping, Disp: , Rfl:   •  hydrOXYzine pamoate (VISTARIL) 50 MG capsule, Take 1 capsule by mouth At Night As Needed (insomnia). Indications: Insomnia (Patient not taking: Reported on 6/1/2023), Disp: 30 capsule, Rfl: 1    Review of Systems   Review of Systems   Constitutional: Positive for malaise/fatigue. Negative for weight loss.   Cardiovascular: Negative for chest pain, claudication and dyspnea on exertion.   Respiratory: Negative for cough and shortness of breath.    Skin: Negative for  "color change and poor wound healing.   Musculoskeletal: Positive for arthritis, joint pain, muscle weakness and neck pain.   Neurological: Negative for dizziness, numbness and weakness.   Psychiatric/Behavioral: Positive for depression.       Physical Exam   Vitals:    06/01/23 1344   BP: 118/74   BP Location: Left arm   Pulse: 86   Temp: 98.4 °F (36.9 °C)   TempSrc: Infrared   SpO2: 97%   Weight: 61 kg (134 lb 6.4 oz)   Height: 160 cm (63\")     Body surface area is 1.63 meters squared.  Body mass index is 23.81 kg/m².  Physical Exam  Constitutional:       General: She is not in acute distress.     Appearance: She is not ill-appearing.   HENT:      Right Ear: Hearing normal.      Left Ear: Hearing normal.      Nose: No nasal deformity.      Mouth/Throat:      Dentition: Normal dentition. Does not have dentures.   Cardiovascular:      Rate and Rhythm: Normal rate and regular rhythm.      Pulses:           Carotid pulses are 2+ on the right side and 2+ on the left side.       Radial pulses are 2+ on the right side and 2+ on the left side.        Posterior tibial pulses are 2+ on the right side and 2+ on the left side.      Heart sounds: No murmur heard.  Pulmonary:      Effort: Pulmonary effort is normal.      Breath sounds: Normal breath sounds.   Abdominal:      General: There is no distension.      Palpations: Abdomen is soft. There is no mass.      Tenderness: There is no abdominal tenderness.   Musculoskeletal:         General: No deformity.      Comments: Gait normal.    Skin:     General: Skin is warm and dry.      Coloration: Skin is not pale.      Findings: No erythema.      Comments: No venous staining   Neurological:      Mental Status: She is alert and oriented to person, place, and time.   Psychiatric:         Speech: Speech normal.         Behavior: Behavior is cooperative.         Thought Content: Thought content normal.         Judgment: Judgment normal.         BUN   Date Value Ref Range Status "   06/17/2022 13 6 - 20 mg/dL Final     Creatinine   Date Value Ref Range Status   06/17/2022 0.81 0.57 - 1.00 mg/dL Final     eGFR Non  Amer   Date Value Ref Range Status   08/21/2020 91 >60 mL/min/1.73 Final       ASSESSMENT:  Diagnoses and all orders for this visit:    1. Adenocarcinoma of left lung (HCC) (Primary)  -     CT Chest Without Contrast Diagnostic; Future    2. Mixed hyperlipidemia    3. Panlobular emphysema (HCC)    4. Nicotine dependence, cigarettes, with other nicotine-induced disorders    PLAN:  Detailed discussion with Alysia Deutsch regarding situation and options.  Stable stage 1 lung cancer, no evidence of recurrence or new primary.  Continues to smoke due to stress..  Multiple risk factors with severe comorbidities.  No intervention indicated at this time.  Will follow with interval imaging.  Risks, benefits discussed.  Understands and wishes to proceed with plan.    Return in 6 months with CT Chest    Return after above studies complete  Smoking cessation advised and assistance options offered including behavioral counseling ([facility] [facility]), Nicotine replacement therapy (patches or gum), pharmacologic therapy (Chantix, Wellbutrin). patient understands that continued use of tobacco products increases risk of heart disease, stroke, cancer; counseling for 3-5min.    Recommended regular physical activity, progressive walking program.  Continue current medications as directed.  Advance Care Planning   ACP discussion was declined by the patient. Patient does not have an advance directive, declines further assistance.    Thank you for the opportunity to participate in this patient's care.    Copy to primary care provider.

## 2023-06-01 NOTE — PROGRESS NOTES
6/1/2023    Alysia Deutsch  1965    Chief Complaint:    Chief Complaint   Patient presents with   • Lung Nodule       HPI:      PCP:  Rosario Mckeon MD  GI:  Dr Alcantara  Ortho: Dr. Gaines     57y.o. female with HTN(stable, increased risk stroke, rupture), Hyperlipidemia(stable, increase risk cardiovascular events), COPD(chronic progression, increase risk pulmonary complications) and Smoker(uncontrolled, increased risk cardiovascular events) insomnia, lung cancer(stable, BEAU) .  smokes 0.5 PPD.  asymptomatic ANGELINA pulmonary bullae.  No TIA stroke amaurosis.  No MI claudication.   No other associated signs, symptoms or modifying factors.     6/2020 CT Brain:  No acute findings.  6/2020 CT Chest: 20mm nodule LEFT lower lobe.  No significant adenopathy.  Liver, adrenals ok.  8/2020 PET CT: LLL nodule 22mm (SUV 5.4)  8/2020 PFT:  FVC 2.9 (136%), FEV1 1.8 (103%), DLCO 54%  8/2020 LEFT VAT lower lobectomy  Path: Adenocarcinoma, ([id number])  2/2021 CT Chest: no evidence of recurrence.  Liver adrenals ok.:    8/2021 CT Chest:  No evidence recurrence.  Liver adrenals ok.  40mm RIGHT upper lobe bulla  2/2022 CT Chest:  No evidence recurrence.  Liver adrenals ok.  40mm RIGHT upper lobe bulla  8/2022 CT Chest:  No evidence recurrence.  Liver adrenals ok.  44mm RIGHT upper lobe bulla  5/2023 CT Chest:  No evidence recurrence.  Liver adrenals ok.  47mm RIGHT upper lobe bulla    The following portions of the patient's history were reviewed and updated as appropriate: allergies, current medications, past family history, past medical history, past social history, past surgical history and problem list.  Recent images independently reviewed.  Available laboratory values reviewed.    PMH:  Past Medical History:   Diagnosis Date   • Anxiety    • Back pain    • Cancer     lung   • Hepatitis    • Lung mass    • PONV (postoperative nausea and vomiting)    • Shoulder pain, right      Past Surgical History:   Procedure Laterality  Date   • BREAST AUGMENTATION     • COLONOSCOPY N/A 10/27/2020    Procedure: COLONOSCOPY;  Surgeon: Carlos Enrique Alcantara MD;  Location: [facility] ENDOSCOPY;  Service: Gastroenterology;  Laterality: N/A;   • ENDOSCOPY N/A 10/27/2020    Procedure: ESOPHAGOGASTRODUODENOSCOPY;  Surgeon: Carlos Enrique Alcantara MD;  Location: [facility] ENDOSCOPY;  Service: Gastroenterology;  Laterality: N/A;   • LAPAROSCOPIC TUBAL LIGATION     • LUNG LOBECTOMY Left 08/20/2020    LOWER   • SHOULDER ARTHROSCOPY Right 11/22/2021    Procedure: ARTHROSCOPY OF THE RIGHT SHOULDER WITH SUBACROMIAL DECOMPRESSION, [place] PROCEDURE;  Surgeon: Dario Wilson MD;  Location: [facility] OR;  Service: Orthopedics;  Laterality: Right;   • SPINAL FUSION Right 06/07/2021    R SI joint fusion   • THORACOSCOPY Left 8/20/2020    Procedure: LEFT THORACOSCOPY VIDEO ASSISTED THORACOTOMY pulmonary resection thoracic mediastinal lymphadenectomy bronchoscopy;  Surgeon: Horace Christiansen MD;  Location: [facility] OR;  Service: Cardiothoracic;  Laterality: Left;  [place] [phone]  VATS [phone]     Family History   Problem Relation Age of Onset   • Diabetes Other    • Cancer Other    • No Known Problems Mother    • Heart disease Father    • Pancreatic cancer Father    • Colon cancer Maternal Grandfather      Social History     Tobacco Use   • Smoking status: Every Day     Packs/day: 1.00     Years: 30.00     Pack years: 30.00     Types: Cigarettes     Passive exposure: Current   • Smokeless tobacco: Never   Vaping Use   • Vaping Use: Never used   Substance Use Topics   • Alcohol use: Not Currently   • Drug use: Not Currently     Comment: 30 YRS AGO       ALLERGIES:  Allergies   Allergen Reactions   • Codeine Hives and Itching   • Morphine Nausea And Vomiting     Severe vomiting   • Adhesive Tape Rash         MEDICATIONS:    Current Outpatient Medications:   •  acetaminophen (TYLENOL) 500 MG tablet, Take 2 tablets by mouth Every 6 (Six) Hours As Needed for Mild Pain.  "Indications: Pain, Disp: , Rfl:   •  fluticasone (FLONASE) 50 MCG/ACT nasal spray, 1 spray into the nostril(s) as directed by provider Daily. Indications: Allergic Rhinitis, Disp: , Rfl:   •  Loratadine 10 MG capsule, Take 1 capsule by mouth Daily. Indications: Hayfever, Disp: , Rfl:   •  meloxicam (MOBIC) 15 MG tablet, TAKE 1 TABLET BY MOUTH EVERY DAY WITH FOOD (Patient taking differently: TAKE 1 TABLET BY MOUTH EVERY DAY WITH FOOD prn), Disp: 30 tablet, Rfl: 0  •  mirtazapine (REMERON) 30 MG tablet, Take 1 tablet by mouth Every Night. Indications: Major Depressive Disorder, Panic Disorder, Disp: 30 tablet, Rfl: 1  •  nicotine (NICODERM CQ) 21 MG/24HR patch, Place 1 patch on the skin as directed by provider Daily. Indications: Nicotine Addiction, Disp: , Rfl:   •  temazepam (RESTORIL) 15 MG capsule, Take 1-2 capsules by mouth Every Night. Indications: Trouble Sleeping, Disp: , Rfl:   •  hydrOXYzine pamoate (VISTARIL) 50 MG capsule, Take 1 capsule by mouth At Night As Needed (insomnia). Indications: Insomnia (Patient not taking: Reported on 6/1/2023), Disp: 30 capsule, Rfl: 1    Review of Systems   Review of Systems   Constitutional: Positive for malaise/fatigue. Negative for weight loss.   Cardiovascular: Negative for chest pain, claudication and dyspnea on exertion.   Respiratory: Negative for cough and shortness of breath.    Skin: Negative for color change and poor wound healing.   Musculoskeletal: Positive for arthritis, joint pain, muscle weakness and neck pain.   Neurological: Negative for dizziness, numbness and weakness.   Psychiatric/Behavioral: Positive for depression.       Physical Exam   Vitals:    06/01/23 1344   BP: 118/74   BP Location: Left arm   Pulse: 86   Temp: 98.4 °F (36.9 °C)   TempSrc: Infrared   SpO2: 97%   Weight: 61 kg (134 lb 6.4 oz)   Height: 160 cm (63\")     Body surface area is 1.63 meters squared.  Body mass index is 23.81 kg/m².  Physical Exam  Constitutional:       General: She is " not in acute distress.     Appearance: She is not ill-appearing.   HENT:      Right Ear: Hearing normal.      Left Ear: Hearing normal.      Nose: No nasal deformity.      Mouth/Throat:      Dentition: Normal dentition. Does not have dentures.   Cardiovascular:      Rate and Rhythm: Normal rate and regular rhythm.      Pulses:           Carotid pulses are 2+ on the right side and 2+ on the left side.       Radial pulses are 2+ on the right side and 2+ on the left side.        Posterior tibial pulses are 2+ on the right side and 2+ on the left side.      Heart sounds: No murmur heard.  Pulmonary:      Effort: Pulmonary effort is normal.      Breath sounds: Normal breath sounds.   Abdominal:      General: There is no distension.      Palpations: Abdomen is soft. There is no mass.      Tenderness: There is no abdominal tenderness.   Musculoskeletal:         General: No deformity.      Comments: Gait normal.    Skin:     General: Skin is warm and dry.      Coloration: Skin is not pale.      Findings: No erythema.      Comments: No venous staining   Neurological:      Mental Status: She is alert and oriented to person, place, and time.   Psychiatric:         Speech: Speech normal.         Behavior: Behavior is cooperative.         Thought Content: Thought content normal.         Judgment: Judgment normal.         BUN   Date Value Ref Range Status   06/17/2022 13 6 - 20 mg/dL Final     Creatinine   Date Value Ref Range Status   06/17/2022 0.81 0.57 - 1.00 mg/dL Final     eGFR Non  Amer   Date Value Ref Range Status   08/21/2020 91 >60 mL/min/1.73 Final       ASSESSMENT:  Diagnoses and all orders for this visit:    1. Adenocarcinoma of left lung (HCC) (Primary)  -     CT Chest Without Contrast Diagnostic; Future    2. Mixed hyperlipidemia    3. Panlobular emphysema (HCC)    4. Nicotine dependence, cigarettes, with other nicotine-induced disorders    PLAN:  Detailed discussion with Alysia Deutsch regarding  situation and options.  Stable stage 1 lung cancer, no evidence of recurrence or new primary.  Continues to smoke due to stress..  Multiple risk factors with severe comorbidities.  No intervention indicated at this time.  Will follow with interval imaging.  Risks, benefits discussed.  Understands and wishes to proceed with plan.    Return in 6 months with CT Chest  Changing jobs at "[employer] ok to wear respirator half mask <2hrs/week.    Return after above studies complete  Smoking cessation advised and assistance options offered including behavioral counseling ([employer] [facility]), Nicotine replacement therapy (patches or gum), pharmacologic therapy (Chantix, Wellbutrin). patient understands that continued use of tobacco products increases risk of heart disease, stroke, cancer; counseling for 3-5min.    Recommended regular physical activity, progressive walking program.  Continue current medications as directed.  Advance Care Planning   ACP discussion was declined by the patient. Patient does not have an advance directive, declines further assistance.     Thank you for the opportunity to participate in this patient's care.    Copy to primary care provider.

## 2023-09-14 DIAGNOSIS — M81.8 IDIOPATHIC OSTEOPOROSIS: Primary | ICD-10-CM

## 2023-09-14 RX ORDER — ZOLEDRONIC ACID 5 MG/100ML
5 INJECTION, SOLUTION INTRAVENOUS ONCE
OUTPATIENT
Start: 2023-09-21

## 2023-09-14 RX ORDER — SODIUM CHLORIDE 9 MG/ML
250 INJECTION, SOLUTION INTRAVENOUS ONCE
OUTPATIENT
Start: 2023-09-21

## 2023-09-28 ENCOUNTER — INFUSION (OUTPATIENT)
Dept: ONCOLOGY | Facility: HOSPITAL | Age: 58
End: 2023-09-28
Payer: COMMERCIAL

## 2023-09-28 VITALS
TEMPERATURE: 96.5 F | HEART RATE: 66 BPM | SYSTOLIC BLOOD PRESSURE: 93 MMHG | DIASTOLIC BLOOD PRESSURE: 54 MMHG | RESPIRATION RATE: 18 BRPM

## 2023-09-28 DIAGNOSIS — M81.8 IDIOPATHIC OSTEOPOROSIS: Primary | ICD-10-CM

## 2023-09-28 LAB
ALBUMIN SERPL-MCNC: 4.2 G/DL (ref 3.5–5.2)
ALBUMIN/GLOB SERPL: 1.6 G/DL
ALP SERPL-CCNC: 39 U/L (ref 39–117)
ALT SERPL W P-5'-P-CCNC: 8 U/L (ref 1–33)
ANION GAP SERPL CALCULATED.3IONS-SCNC: 10 MMOL/L (ref 5–15)
AST SERPL-CCNC: 17 U/L (ref 1–32)
BILIRUB SERPL-MCNC: 0.2 MG/DL (ref 0–1.2)
BUN SERPL-MCNC: 15 MG/DL (ref 8–23)
BUN/CREAT SERPL: 19 (ref 7–25)
CALCIUM SPEC-SCNC: 8.9 MG/DL (ref 8.6–10.5)
CHLORIDE SERPL-SCNC: 103 MMOL/L (ref 98–107)
CO2 SERPL-SCNC: 23 MMOL/L (ref 22–29)
CREAT SERPL-MCNC: 0.79 MG/DL (ref 0.57–1)
EGFRCR SERPLBLD CKD-EPI 2021: 85.8 ML/MIN/1.73
GLOBULIN UR ELPH-MCNC: 2.6 GM/DL
GLUCOSE SERPL-MCNC: 92 MG/DL (ref 65–99)
POTASSIUM SERPL-SCNC: 4.4 MMOL/L (ref 3.5–5.2)
PROT SERPL-MCNC: 6.8 G/DL (ref 6–8.5)
SODIUM SERPL-SCNC: 136 MMOL/L (ref 136–145)

## 2023-09-28 PROCEDURE — 25010000002 ZOLEDRONIC ACID 5 MG/100ML SOLUTION: Performed by: NURSE PRACTITIONER

## 2023-09-28 PROCEDURE — 96374 THER/PROPH/DIAG INJ IV PUSH: CPT | Performed by: NURSE PRACTITIONER

## 2023-09-28 PROCEDURE — 80053 COMPREHEN METABOLIC PANEL: CPT

## 2023-09-28 RX ORDER — SODIUM CHLORIDE 9 MG/ML
250 INJECTION, SOLUTION INTRAVENOUS ONCE
Status: CANCELLED | OUTPATIENT
Start: 2024-09-27

## 2023-09-28 RX ORDER — ZOLEDRONIC ACID 5 MG/100ML
5 INJECTION, SOLUTION INTRAVENOUS ONCE
Status: CANCELLED | OUTPATIENT
Start: 2024-09-27

## 2023-09-28 RX ORDER — ZOLEDRONIC ACID 5 MG/100ML
5 INJECTION, SOLUTION INTRAVENOUS ONCE
Status: COMPLETED | OUTPATIENT
Start: 2023-09-28 | End: 2023-09-28

## 2023-09-28 RX ORDER — SODIUM CHLORIDE 9 MG/ML
250 INJECTION, SOLUTION INTRAVENOUS ONCE
Status: COMPLETED | OUTPATIENT
Start: 2023-09-28 | End: 2023-09-28

## 2023-09-28 RX ADMIN — ZOLEDRONIC ACID 5 MG: 0.05 INJECTION, SOLUTION INTRAVENOUS at 13:03

## 2023-09-28 RX ADMIN — SODIUM CHLORIDE 250 ML: 9 INJECTION, SOLUTION INTRAVENOUS at 13:02

## 2024-03-24 NOTE — PROCEDURES
Kera Hoffman   MRN: [id number]   ADMISSION DATE: 3/23/2024  : 1955  AGE: 68 y.o.    DATE :  2024     PROVIDER: CHAO MCKENZIE    REASON FOR REFERRAL   Colonoscopy    SUBJECTIVE:  Kera Hoffman is a 68 y.o. female with a past medical history of essential hypertension, hyperlipidemia, and diverticulitis who presented to [facility] on 3/23/2024 with c/o abdominal pain.  Patient reported lower abdominal pain with diarrhea.  Patient reported 2 episodes of non-bloody diarrhea.  Patient denied nausea, vomiting, fever, chills, dysuria, hematuria, rectal bleeding, dark stools, chest pain, and shortness of breath.  Initial vital signs in ED were /119, pulse 67, respirations 16, temperature 36.7° C, and SpO2 98% on room air.  Labs revealed WBC 7.90, RBC 4.04, hemoglobin 11.9, hematocrit 36.7, chloride 109,  BUN 20.4, and creatinine 0.92.  UA revealed 1+ protein.  CT abdomen and pelvis with IV contrast revealed bilateral nonspecific perinephric stranding which may be chronic with recommendations of correlation for UTI, long segment of sigmoid colon narrowing probably the result of chronic diverticulitis with pathological stricture difficult to exclude, and small pericardial effusion and trace right pleural effusion.  Chest x-ray revealed no acute cardiopulmonary process identified.  GI was consulted with plans for colonoscopy.  Patient was given IV Zofran 4 mg, IV hydralazine 10 mg, and IV morphine 4 mg in ED. Patient was admitted to hospital medicine service for further medical management.     Patient reports last colonoscopy done in [place] per Dr. Hernandez approximately 15-20 years ago.  No records available.  She has not seen any gastroenterologist in the past.      Review of systems  History of intermittent crampy right-sided abdominal pain.  No significant left-sided abdominal pain reported.  Low-grade fever in hospital.  No overt GI blood loss.  History of occasional loose bowels.  Patient  Pulmonary Function Test  Performed by: Radha Osei MD  Authorized by: Horace Christiansen MD      Pre Drug    FVC: 137%   FEV1: 101%   FEV1/FVC: 60%     Post Drug    FVC: 136%   FEV1: 103%   FEV1/FVC: 62%      Change in % (calculated):    FVC: 0   FEV1: 2    Interpretation   Spirometry   Spirometry shows mild obstruction. The patient's response to bronchodilators has no change.   Review of FVL curve   Effort is normal.        reports postprandial bowel movements which are somewhat loose especially after high-fiber diet.  History of some weight loss.  Appetite somewhat decreased although she has also been trying to lose some weight as well.    Review of patient's allergies indicates:  No Known Allergies      amLODIPine  5 mg Oral BID    atenoloL  100 mg Oral BID    atorvastatin  10 mg Oral Daily    ciprofloxacin  400 mg Intravenous Q12H    doxazosin  4 mg Oral QHS    losartan  100 mg Oral Daily    metronidazole  500 mg Intravenous Q8H    pantoprazole  40 mg Oral BID AC       Medications Discontinued During This Encounter   Medication Reason    lactated ringers infusion     ciprofloxacin (CIPRO)400mg/200ml D5W IVPB 400 mg     metronidazole IVPB 500 mg         Past Medical History:   Diagnosis Date    Hypertension       Social History     Socioeconomic History    Marital status:    Tobacco Use    Smoking status: Never    Smokeless tobacco: Never   Substance and Sexual Activity    Alcohol use: Not Currently    Drug use: Never      Past Surgical History:   Procedure Laterality Date    HYSTERECTOMY          History reviewed. No pertinent family history.       OBJECTIVE:     Vitals:    03/24/24 0813 03/24/24 1141 03/24/24 1150 03/24/24 1627   BP: (!) 143/77 (!) 154/82 (!) 154/82 (!) 159/72   BP Location:       Patient Position:       Pulse: 79 71  62   Resp: 20 20     Temp: 100.1 °F (37.8 °C) 98.8 °F (37.1 °C)  98 °F (36.7 °C)   TempSrc: Oral Oral  Oral   SpO2: 98% 98%  96%   Weight:       Height:           Physical Exam  HENT:      Head: Normocephalic and atraumatic.      Nose: Nose normal.      Mouth/Throat:      Pharynx: Oropharynx is clear.   Eyes:      Extraocular Movements: Extraocular movements intact.      Conjunctiva/sclera: Conjunctivae normal.      Pupils: Pupils are equal, round, and reactive to light.   Cardiovascular:      Rate and Rhythm: Normal rate and regular rhythm.   Pulmonary:      Effort: Pulmonary effort is normal.  "     Breath sounds: Normal breath sounds.   Abdominal:      General: Bowel sounds are normal.      Palpations: Abdomen is soft.   Musculoskeletal:         General: Normal range of motion.      Cervical back: Neck supple.   Skin:     General: Skin is warm and dry.   Neurological:      Mental Status: She is alert and oriented to person, place, and time.   Psychiatric:         Mood and Affect: Mood normal.            LABS    Recent Labs   Lab 03/23/24  1612   WBC 7.90   HGB 11.9*   HCT 36.7*         Recent Labs   Lab 03/23/24  1611      K 3.9   CO2 24   BUN 20.4*   CREATININE 0.92   CALCIUM 9.2   BILITOT 0.6   ALKPHOS 56   ALT 21   AST 20   GLUCOSE 79*      Recent Labs   Lab 03/23/24 2120   INR 1.1    No results for input(s): "AMYLASE" in the last 168 hours.   Recent Labs   Lab 03/23/24 2120   INR 1.1   PTT 28.5           RESULTS: US Retroperitoneal Complete    Result Date: 3/24/2024  EXAMINATION: US RETROPERITONEAL COMPLETE CLINICAL HISTORY: renal cysts vs other pathology CT unable to determine; TECHNIQUE: Ultrasound evaluation of the kidneys, region of the ureters, and urinary bladder. COMPARISON: CT 03/23/2024 FINDINGS: No hydronephrosis. Two adjacent cysts within the lower left kidney measure up to 29 and 20 mm.  Ill-defined slightly hypoechoic 29 mm area in the upper left kidney may correspond with an additional left renal cyst on CT.  No suspicious renal lesion identified. Bladder not visualized, likely decompressed. Patent superior IVC.  No significant findings regarding the visualized proximal/mid aorta.  Distal aorta not visualized. Measurements: - Right kidney: 10.4 cm in length - Left kidney: 9.7 cm in length     Few left renal cysts.  No suspicious renal lesion appreciated. Electronically signed by: Trent Bryan Date:    03/24/2024 Time:    15:05    X-Ray Chest AP Portable    Result Date: 3/23/2024  EXAMINATION: XR CHEST AP PORTABLE CLINICAL HISTORY: preop exam; TECHNIQUE: One view " COMPARISON: None available. FINDINGS: Cardiopericardial silhouette enlarged appearance is similar.  Lungs are without dense focal or segmental consolidation, congestive process, pleural effusions or pneumothorax.     NO ACUTE CARDIOPULMONARY PROCESS IDENTIFIED. Electronically signed by: Murali Ramirez Date:    03/23/2024 Time:    21:59    CT Abdomen Pelvis With IV Contrast NO Oral Contrast    Result Date: 3/23/2024  EXAMINATION: CT ABDOMEN PELVIS WITH IV CONTRAST CLINICAL HISTORY: lower abdominal pain; TECHNIQUE: Multidetector axial images were obtained of the abdomen and pelvis following the administration of IV contrast. Oral contrast was not administered. Dose length product of 683 mGycm. Automated exposure control was utilized to minimize radiation dose. COMPARISON: None available. FINDINGS: Visualized portion of the heart is enlarged in size and there is partially visualized pericardial effusion with maximum thickness of 1.4 cm.  There is trace of fluid within the right dependent pleural space.  No acute infiltrates or congestive opacities of imaged portion of the lungs. Hepatic volume and attenuation is unremarkable. Gallbladder wall is not thickened and there is no intra luminal calcified calculus. No biliary dilation identified. Pancreatic unremarkable attenuation without acute peripancreatic phlegmons. Main pancreatic duct is not dilated. Spleen is of normal size without focal lesion. Right adrenal gland is unremarkable.  There is slight thickening of the left adrenal gland.  Bilateral kidneys lobular contour is consistent with scarring.  The left kidney up to 2.5 cm hypodensities which are favored to be cysts and for these no specific imaging is recommended.  There are additional very small kidneys hypodensities which may again represent cysts but are small to characterize and can be further assessed with ultrasound exam.  There are perinephric strandings combined minimal amount of fluid.  No kidneys  collecting system dilatation and there is no hydroureter.  There are no retroperitoneal periaortic enlarged lymph nodes. Stomach is decompressed and difficult to assess.  Small hiatal hernia.  Distal esophageal mild mural thickening may be the result of reflux disease.  No abnormal dilatation of the loops of small bowel.  Appendix is nondilated.  There is diverticulosis coli with sigmoid colon predominance.  There is irregular luminal narrowing of the proximal portion of the sigmoid colon from image 91 to image 98 series 2 which may be the result of chronic diverticulitis.  Possibility of pathologic stricture is difficult to exclude on this exam.  Please correlate patient is up-to-date on colonoscopy.  There are no convincing findings of acute diverticulitis and there is no bowel obstruction, free air or free fluid. Urinary bladder appears within normal limits. There is no pelvic free fluid.  There is right pelvic intramuscular lipoma There is grade 1 anterolisthesis of L4 on L5.  Multilevel thoracolumbar degenerative changes.  Demineralization of the bones.  Chronic deformity of the left iliac bone.  No acute or otherwise osseous abnormality identified.     1. Bilateral kidneys scarring and hypodense cystic structure discussed above.  Bilateral nonspecific perinephric strandings.  These may be chronic.  Please correlate clinically there is no acute urinary tract infection. 2. Long segment of sigmoid colon narrowing probably is the result of chronic diverticulitis.  A pathologic stricture is difficult to exclude on this exam.  Please correlate, patient is up-to-date on colonoscopy.  No convincing findings of acute diverticulitis. 3.  Small pericardial effusion and trace of right pleural effusion. Electronically signed by: Murali Ramirez Date:    03/23/2024 Time:    19:16             ICD-10-CM ICD-9-CM   1. Lower abdominal pain  R10.30 789.09   2. Pre-op exam  Z01.818 V72.84   3. Stricture of sigmoid colon  K56.699  560.9   4. Pericardial effusion  I31.39 423.9            ASSESSMENT & PLAN:   Kera Hoffman is a 68 y.o. female with a past medical history of essential hypertension, hyperlipidemia, and diverticulitis who presented to [facility] on 3/23/2024 with c/o abdominal pain.  Patient reported lower abdominal pain with diarrhea.  Patient reported 2 episodes of non-bloody diarrhea.  Patient denied nausea, vomiting, fever, chills, dysuria, hematuria, rectal bleeding, dark stools, chest pain, and shortness of breath.  Initial vital signs in ED were /119, pulse 67, respirations 16, temperature 36.7° C, and SpO2 98% on room air.  Labs revealed WBC 7.90, RBC 4.04, hemoglobin 11.9, hematocrit 36.7, chloride 109,  BUN 20.4, and creatinine 0.92.  UA revealed 1+ protein.  CT abdomen and pelvis with IV contrast revealed bilateral nonspecific perinephric stranding which may be chronic with recommendations of correlation for UTI, long segment of sigmoid colon narrowing probably the result of chronic diverticulitis with pathological stricture difficult to exclude, and small pericardial effusion and trace right pleural effusion.  Chest x-ray revealed no acute cardiopulmonary process identified.  GI was consulted with plans for colonoscopy.  Patient was given IV Zofran 4 mg, IV hydralazine 10 mg, and IV morphine 4 mg in ED. Patient was admitted to hospital medicine service for further medical management.     Patient reports last colonoscopy done in [place] per Dr. Hernandez approximately 15-20 years ago.  No records available.  She has not seen any gastroenterologist in the past.    Recommendations:  1. Full liquid diet today.  Clear liquid diet starting tomorrow.    2. Agree with IV antibiotic as well as IV fluids.    3. Patient reported to have a low-grade fever today.  Plan for lower GI endoscopy (flexible sigmoidoscopy versus colonoscopy) possibly on Tuesday.  Procedures have been discussed with the patient in details.  She  agrees and would like to proceed.  Family members were present as well.  4. Monitor for any additional febrile episodes.    Thank you for the consult

## (undated) DEVICE — VISUALIZATION SYSTEM: Brand: CLEARIFY

## (undated) DEVICE — PK MAJ PROC LF 60

## (undated) DEVICE — GLV SURG SIGNATURE ESSENTIAL PF LTX SZ7

## (undated) DEVICE — SUT MONOCRYL 4/0 PS2 27IN Y426H ETY426H

## (undated) DEVICE — STERILE POLYISOPRENE POWDER-FREE SURGICAL GLOVES WITH EMOLLIENT COATING: Brand: PROTEXIS

## (undated) DEVICE — BITEBLOCK ENDO W/STRAP 60F A/ LF DISP

## (undated) DEVICE — ECHELON FLEX POWERED PLUS COMPACT ARTICULATING ENDOSCOPIC LINEAR CUTTER, 60MM: Brand: ECHELON FLEX

## (undated) DEVICE — TOTAL TRAY, 16FR 10ML SIL FOLEY, URN: Brand: MEDLINE

## (undated) DEVICE — PAD,ABDOMINAL,8"X10",ST,LF: Brand: MEDLINE

## (undated) DEVICE — PATIENT RETURN ELECTRODE, SINGLE-USE, CONTACT QUALITY MONITORING, ADULT, WITH 9FT CORD, FOR PATIENTS WEIGING OVER 33LBS. (15KG): Brand: MEGADYNE

## (undated) DEVICE — 3M™ STERI-STRIP™ REINFORCED ADHESIVE SKIN CLOSURES, R1547, 1/2 IN X 4 IN (12 MM X 100 MM), 6 STRIPS/ENVELOPE: Brand: 3M™ STERI-STRIP™

## (undated) DEVICE — GOWN,AURORA,NOREINF,RAGLAN,XL,STERILE: Brand: MEDLINE

## (undated) DEVICE — STPLR SKIN VISISTAT WD 35CT

## (undated) DEVICE — CONN TBG Y 5 IN 1 LF STRL

## (undated) DEVICE — COVER,TABLE,EXT,80"X84",STRL: Brand: MEDLINE

## (undated) DEVICE — APPL CHLORAPREP W/TINT 26ML ORNG

## (undated) DEVICE — TBG PUMP ARTHSCP MAIN AR6400 16FT

## (undated) DEVICE — OASIS DRAIN, SINGLE, INLINE & ATS COMPATIBLE: Brand: OASIS

## (undated) DEVICE — GLV SURG SENSICARE PI LF PF 8 GRN STRL

## (undated) DEVICE — SOL IRRIG H2O 1000ML STRL

## (undated) DEVICE — GOWN,PREVENTION PLUS,XLONG/XLARGE,STRL: Brand: MEDLINE

## (undated) DEVICE — CONTAINER,SPECIMEN,OR STERILE,4OZ: Brand: MEDLINE

## (undated) DEVICE — SUT VIC 2/0 SH 27IN

## (undated) DEVICE — SINGLE-USE BIOPSY FORCEPS: Brand: RADIAL JAW 4

## (undated) DEVICE — SUT SILK 0 FSL 18IN 678G

## (undated) DEVICE — DRP WARMR MACH

## (undated) DEVICE — TBG ARTHSCP DUALWAVE

## (undated) DEVICE — SPNG DRN AMD EXCILON 6PLY 4X4IN PK/2

## (undated) DEVICE — SUT VIC 3/0 SH 27IN J416H

## (undated) DEVICE — ELECTRD BLD EZ CLN MOD 2.5IN

## (undated) DEVICE — 3M™ WARMING BLANKET, LOWER BODY, 10 PER CASE, 42568: Brand: BAIR HUGGER™

## (undated) DEVICE — SUT SILK 0 FSL 18IN 678H

## (undated) DEVICE — 3M™ IOBAN™ 2 ANTIMICROBIAL INCISE DRAPE 6651EZ: Brand: IOBAN™ 2

## (undated) DEVICE — Device

## (undated) DEVICE — WOUND RETRACTOR AND PROTECTOR: Brand: ALEXIS O WOUND PROTECTOR-RETRACTOR

## (undated) DEVICE — PK SHLDR ARTHSCP 60

## (undated) DEVICE — GLV SURG SIGNATURE ESSENTIAL PF LTX SZ6.5

## (undated) DEVICE — SUT VIC 2 TP 1MS/4 27IN DYED J649G

## (undated) DEVICE — CANN TWST IN 7CM 8.25MM ID

## (undated) DEVICE — GLV SURG SIGNATURE ESSENTIAL PF LTX SZ8

## (undated) DEVICE — ABL OPES COOLCUT ASPIR 3MM 90D

## (undated) DEVICE — 24 FR EXTENDED LENGTH – SILICONE CATHETER: Brand: SILICONE THORACIC CATHETERS

## (undated) DEVICE — SYS SKIN CLS DERMABOND PRINEO W/22CM MESH TP

## (undated) DEVICE — ECHELON FLEX  POWERED VASCULAR STAPLER WITH ADVANCED PLACEMENT TIP, 35MM: Brand: ECHELON FLEX

## (undated) DEVICE — DRSNG TELFA PAD NONADH STR 1S 3X8IN

## (undated) DEVICE — INTENDED FOR TISSUE SEPARATION, AND OTHER PROCEDURES THAT REQUIRE A SHARP SURGICAL BLADE TO PUNCTURE OR CUT.: Brand: BARD-PARKER ® CARBON RIB-BACK BLADES

## (undated) DEVICE — BLD SHAVER RESECTOR SERR AGGR 5MM 13CM

## (undated) DEVICE — STERILE POLYISOPRENE POWDER-FREE SURGICAL GLOVES: Brand: PROTEXIS

## (undated) DEVICE — PREVENA PEEL & PLACE SYSTEM KIT- 13 CM: Brand: PREVENA™ PEEL & PLACE™

## (undated) DEVICE — TOWEL,OR,DSP,ST,BLUE,DLX,4/PK,20PK/CS: Brand: MEDLINE

## (undated) DEVICE — DRSNG GZ CURAD XEROFORM NONADHS 5X9IN STRL

## (undated) DEVICE — SUT PROLN 4/0 RB1 D/A 36IN 8557H

## (undated) DEVICE — SOL IRR LACT RNG BG 3000ML

## (undated) DEVICE — SHEET,DRAPE,53X77,STERILE: Brand: MEDLINE

## (undated) DEVICE — PENCL ES MEGADINE EZ/CLEAN BUTN W/HOLSTR 10FT

## (undated) DEVICE — PENCL E/S HNDSWCH ROCKR CB

## (undated) DEVICE — ADHS LIQ MASTISOL 2/3ML

## (undated) DEVICE — SPNG LAP 18X18IN LF STRL PK/5

## (undated) DEVICE — GLV SURG SENSICARE POLYISPRN W/ALOE PF LF 6 GRN STRL

## (undated) DEVICE — SUT PDS 2 0 CT1 27IN CLR Z259H

## (undated) DEVICE — BNDG ELAS ELITE V/CLOSE 6IN 5YD LF STRL

## (undated) DEVICE — SPNG DISSCT SECTO KTTNER PK/5

## (undated) DEVICE — SUT ETHLN 3-0 FS118IN 663H

## (undated) DEVICE — TRY IRR

## (undated) DEVICE — ON-Q* CATHETERS-SOAKER*: Brand: ON-Q*

## (undated) DEVICE — TP ELAS ELASTIKON ADHS 4IN 2.5YD

## (undated) DEVICE — SUT PROLN 3/0 SH D/A 36IN 8522H

## (undated) DEVICE — SPNG GZ WOVN 4X4IN 12PLY 10/BX STRL

## (undated) DEVICE — ELECTRD BLD EXT EDGE/INSUL 6IN